# Patient Record
Sex: FEMALE | Race: WHITE | Employment: UNEMPLOYED | URBAN - METROPOLITAN AREA
[De-identification: names, ages, dates, MRNs, and addresses within clinical notes are randomized per-mention and may not be internally consistent; named-entity substitution may affect disease eponyms.]

---

## 2017-05-22 ENCOUNTER — OFFICE VISIT (OUTPATIENT)
Dept: FAMILY MEDICINE CLINIC | Age: 37
End: 2017-05-22

## 2017-05-22 VITALS
TEMPERATURE: 97.8 F | SYSTOLIC BLOOD PRESSURE: 121 MMHG | HEIGHT: 60 IN | OXYGEN SATURATION: 100 % | BODY MASS INDEX: 20.81 KG/M2 | WEIGHT: 106 LBS | DIASTOLIC BLOOD PRESSURE: 76 MMHG | HEART RATE: 69 BPM | RESPIRATION RATE: 16 BRPM

## 2017-05-22 DIAGNOSIS — N92.6 MISSED MENSES: Primary | ICD-10-CM

## 2017-05-22 DIAGNOSIS — Z64.1 GRAND MULTIPARA: ICD-10-CM

## 2017-05-22 LAB
HCG URINE, QL. (POC): POSITIVE
VALID INTERNAL CONTROL?: YES

## 2017-05-22 NOTE — PROGRESS NOTES
Chief Complaint   Patient presents with    Missed Menses     1. Have you been to the ER, urgent care clinic since your last visit? Hospitalized since your last visit? No    2. Have you seen or consulted any other health care providers outside of the [address] [place] [address] since your last visit? Include any pap smears or colon screening.  No

## 2017-05-22 NOTE — PATIENT INSTRUCTIONS
Weeks 6 to 10 of Your Pregnancy: Care Instructions  Your Care Instructions    Congratulations on your pregnancy. This is an exciting and important time for you. During the first 6 to 10 weeks of your pregnancy, your body goes through many changes. Your baby grows very fast, even though you cannot feel it yet. You may start to notice that you feel different, both in your body and your emotions. Because each woman's pregnancy is unique, there is no right way to feel. You may feel the healthiest you have ever been, or you may feel tired or sick to your stomach (\"morning sickness\"). These early weeks are a time to make healthy choices and to eat the best foods for you and your baby. This care sheet will give you some ideas. This is also a good time to think about birth defects testing. These are tests done during pregnancy to look for possible problems with the baby. First trimester tests for birth defects can be done between 8 and 17 weeks of pregnancy, depending on the test. Talk with your doctor about what kinds of tests are available. Follow-up care is a key part of your treatment and safety. Be sure to make and go to all appointments, and call your doctor if you are having problems. It's also a good idea to know your test results and keep a list of the medicines you take. How can you care for yourself at home? Eat well  · Eat at least 3 meals and 2 healthy snacks every day. Eat fresh, whole foods, including:  ¨ 7 or more servings of bread, tortillas, cereal, rice, pasta, or oatmeal.  ¨ 3 or more servings of vegetables, especially leafy green vegetables. ¨ 2 or more servings of fruits. ¨ 3 or more servings of milk, yogurt, or cheese. ¨ 2 or more servings of meat, turkey, chicken, fish, eggs, or dried beans. · Drink plenty of fluids, especially water. Avoid sodas and other sweetened drinks. · Choose foods that have important vitamins for your baby, such as calcium, iron, and folate.   ¨ Dairy products, tofu, canned fish with bones, almonds, broccoli, dark leafy greens, corn tortillas, and fortified orange juice are good sources of calcium. ¨ Beef, poultry, liver, spinach, lentils, dried beans, fortified cereals, and dried fruits are rich in iron. ¨ Dark leafy greens, broccoli, asparagus, liver, fortified cereals, orange juice, peanuts, and almonds are good sources of folate. · Avoid foods that could harm your baby. ¨ Do not eat raw or undercooked meat, chicken, or fish (such as sushi or raw oysters). ¨ Do not eat raw eggs or foods that contain raw eggs, such as Caesar dressing. ¨ Do not eat soft cheeses and unpasteurized dairy foods, such as Brie, feta, or blue cheese. ¨ Do not eat fish that contains a lot of mercury, such as shark, swordfish, tilefish, or shant mackerel. Do not eat more than 6 ounces of tuna each week. ¨ Do not eat raw sprouts, especially alfalfa sprouts. ¨ Cut down on caffeine, such as coffee, tea, and cola. Protect yourself and your baby  · Do not touch selma litter or cat feces. They can cause an infection that could harm your baby. · High body temperature can be harmful to your baby. So if you want to use a sauna or hot tub, be sure to talk to your doctor about how to use it safely. [place] with morning sickness  · Sip small amounts of water, juices, or shakes. Try drinking between meals, not with meals. · Eat 5 or 6 small meals a day. Try dry toast or crackers when you first get up, and eat breakfast a little later. · Avoid spicy, greasy, and fatty foods. · When you feel sick, open your windows or go for a short walk to get fresh air. · Try nausea wristbands. These help some women. · Tell your doctor if you think your prenatal vitamins make you sick. Where can you learn more? Go to http://sergo.info/. Enter G112 in the search box to learn more about \"Weeks 6 to 10 of Your Pregnancy: Care Instructions. \"  Current as of:  May 30, 2016  Content Version: 11.2  © 5912-6026 [employer]. Care instructions adapted under license by [employer] (which disclaims liability or warranty for this information). If you have questions about a medical condition or this instruction, always ask your healthcare professional. [address] any warranty or liability for your use of this information.

## 2017-05-22 NOTE — MR AVS SNAPSHOT
Visit Information Date & Time Provider Department Dept. Phone Encounter #  
 5/22/2017  9:40 AM Larisa Ray, [address] [facility] [phone] [id number] Upcoming Health Maintenance Date Due INFLUENZA AGE 9 TO ADULT 8/1/2017 PAP AKA CERVICAL CYTOLOGY 1/29/2019 DTaP/Tdap/Td series (2 - Td) 6/17/2026 Allergies as of 5/22/2017  Review Complete On: 5/22/2017 By: Lenny Ho LPN No Known Allergies Current Immunizations  Reviewed on 8/20/2016 Name Date Influenza Vaccine 1/31/2014 Influenza Vaccine (Quad) PF 10/27/2016 Influenza Vaccine Intradermal PF 3/9/2016 Td, Adsorbed PF 1/24/2014 Tdap 6/17/2016 Not reviewed this visit You Were Diagnosed With   
  
 Codes Comments Missed menses    -  Primary ICD-10-CM: N92.6 ICD-9-CM: 626.4 Vitals BP Pulse Temp Resp Height(growth percentile) Weight(growth percentile) 121/76 69 97.8 °F (36.6 °C) (Oral) 16 5' (1.524 m) 106 lb (48.1 kg) LMP SpO2 BMI OB Status Smoking Status 04/10/2017 (Exact Date) 100% 20.7 kg/m2 Pregnant Current Every Day Smoker BMI and BSA Data Body Mass Index Body Surface Area 20.7 kg/m 2 1.43 m 2 Preferred Pharmacy Pharmacy Name Phone [facility] [address] [place] [address] [phone] Your Updated Medication List  
  
   
This list is accurate as of: 5/22/17 10:16 AM.  Always use your most recent med list.  
  
  
  
  
 IBUPROFEN PO Take  by mouth.  
  
 levothyroxine 75 mcg tablet Commonly known as:  SYNTHROID Take 2 Tabs by mouth Daily (before breakfast). Indications: HYPOTHYROIDISM  
  
 OTHER Taking a antibiotic for a tooth infection but We Performed the Following AMB POC URINE PREGNANCY TEST, VISUAL COLOR COMPARISON [58467 CPT(R)] Patient Instructions Weeks 6 to 10 of Your Pregnancy: Care Instructions Your Care Instructions Congratulations on your pregnancy. This is an exciting and important time for you. During the first 6 to 10 weeks of your pregnancy, your body goes through many changes. Your baby grows very fast, even though you cannot feel it yet. You may start to notice that you feel different, both in your body and your emotions. Because each woman's pregnancy is unique, there is no right way to feel. You may feel the healthiest you have ever been, or you may feel tired or sick to your stomach (\"morning sickness\"). These early weeks are a time to make healthy choices and to eat the best foods for you and your baby. This care sheet will give you some ideas. This is also a good time to think about birth defects testing. These are tests done during pregnancy to look for possible problems with the baby. First trimester tests for birth defects can be done between 8 and 17 weeks of pregnancy, depending on the test. Talk with your doctor about what kinds of tests are available. Follow-up care is a key part of your treatment and safety. Be sure to make and go to all appointments, and call your doctor if you are having problems. It's also a good idea to know your test results and keep a list of the medicines you take. How can you care for yourself at home? Eat well · Eat at least 3 meals and 2 healthy snacks every day. Eat fresh, whole foods, including: ¨ 7 or more servings of bread, tortillas, cereal, rice, pasta, or oatmeal. 
¨ 3 or more servings of vegetables, especially leafy green vegetables. ¨ 2 or more servings of fruits. ¨ 3 or more servings of milk, yogurt, or cheese. ¨ 2 or more servings of meat, turkey, chicken, fish, eggs, or dried beans. · Drink plenty of fluids, especially water. Avoid sodas and other sweetened drinks. · Choose foods that have important vitamins for your baby, such as calcium, iron, and folate.  
¨ Dairy products, tofu, canned fish with bones, almonds, broccoli, dark leafy greens, corn tortillas, and fortified orange juice are good sources of calcium. ¨ Beef, poultry, liver, spinach, lentils, dried beans, fortified cereals, and dried fruits are rich in iron. ¨ Dark leafy greens, broccoli, asparagus, liver, fortified cereals, orange juice, peanuts, and almonds are good sources of folate. · Avoid foods that could harm your baby. ¨ Do not eat raw or undercooked meat, chicken, or fish (such as sushi or raw oysters). ¨ Do not eat raw eggs or foods that contain raw eggs, such as Caesar dressing. ¨ Do not eat soft cheeses and unpasteurized dairy foods, such as Brie, feta, or blue cheese. ¨ Do not eat fish that contains a lot of mercury, such as shark, swordfish, tilefish, or shant mackerel. Do not eat more than 6 ounces of tuna each week. ¨ Do not eat raw sprouts, especially alfalfa sprouts. ¨ Cut down on caffeine, such as coffee, tea, and cola. Protect yourself and your baby · Do not touch selma litter or cat feces. They can cause an infection that could harm your baby. · High body temperature can be harmful to your baby. So if you want to use a sauna or hot tub, be sure to talk to your doctor about how to use it safely. [place] with morning sickness · Sip small amounts of water, juices, or shakes. Try drinking between meals, not with meals. · Eat 5 or 6 small meals a day. Try dry toast or crackers when you first get up, and eat breakfast a little later. · Avoid spicy, greasy, and fatty foods. · When you feel sick, open your windows or go for a short walk to get fresh air. · Try nausea wristbands. These help some women. · Tell your doctor if you think your prenatal vitamins make you sick. Where can you learn more? Go to http://sergo.info/. Enter G112 in the search box to learn more about \"Weeks 6 to 10 of Your Pregnancy: Care Instructions. \" Current as of: May 30, 2016 Content Version: 11.2 © 6294-6737 [employer] Incorporated. Care instructions adapted under license by [employer] (which disclaims liability or warranty for this information). If you have questions about a medical condition or this instruction, always ask your healthcare professional. [address] any warranty or liability for your use of this information. Introducing [facility]! Dear Efra Dempsey: Thank you for requesting a [employer] account. Our records indicate that you already have an active [employer] account. You can access your account anytime at [email or web address] Did you know that you can access your hospital and ER discharge instructions at any time in [employer]? You can also review all of your test results from your hospital stay or ER visit. Additional Information If you have questions, please visit the Frequently Asked Questions section of the [employer] website at [email or web address] Remember, [employer] is NOT to be used for urgent needs. For medical emergencies, dial 911. Now available from your iPhone and Android! Please provide this summary of care documentation to your next provider. Your primary care clinician is listed as [address] [place] [address]. If you have any questions after today's visit, please call [phone].

## 2017-06-01 ENCOUNTER — OFFICE VISIT (OUTPATIENT)
Dept: OBGYN CLINIC | Age: 37
End: 2017-06-01

## 2017-06-01 VITALS
WEIGHT: 102 LBS | HEART RATE: 60 BPM | HEIGHT: 60 IN | DIASTOLIC BLOOD PRESSURE: 63 MMHG | RESPIRATION RATE: 19 BRPM | SYSTOLIC BLOOD PRESSURE: 115 MMHG | BODY MASS INDEX: 20.03 KG/M2

## 2017-06-01 DIAGNOSIS — O09.521 AMA (ADVANCED MATERNAL AGE) MULTIGRAVIDA 35+, FIRST TRIMESTER: Primary | ICD-10-CM

## 2017-06-01 DIAGNOSIS — E03.9 ACQUIRED HYPOTHYROIDISM: ICD-10-CM

## 2017-06-01 PROBLEM — Z34.90 PREGNANCY: Status: ACTIVE | Noted: 2017-06-01

## 2017-06-01 RX ORDER — AMOXICILLIN 500 MG/1
TABLET, FILM COATED ORAL
COMMUNITY
End: 2017-07-24

## 2017-06-01 NOTE — PROGRESS NOTES
Current pregnancy history:    Michelle Lowe is a 29 y.o. female who presents for the evaluation of pregnancy. Patient's last menstrual period was 04/10/2017 (exact date). LMP history:  The date of her LMP is certain. Her last menstrual period was normal and lasted for 4 to 5 days. A urine pregnancy test was positive 2 weeks ago. She was not on the pill at conception. Based on her LMP, her EDC is 01/15/2018 and her EGA is 7 weeks,3 days. Her menstrual cycles are regular and occur approximately every 28 days  and range from 3 to 5 days. The last menses lasted the usual number of days. Ultrasound data:  She had an  ultrasound done by the ultrasound tech today which revealed a viable vail pregnancy with a gestational age of 7 weeks and 4 days giving an [facility] of 2018. Pregnancy symptoms:    Since her LMP she has experienced  urinary frequency, breast tenderness, and nausea. She has not been vomiting over the last few weeks. Associated signs and symptoms which she denies: dysuria, discharge, vaginal bleeding. She states she has not gained weight. Relevant past pregnancy history:   She has the following pregnancy history: Her last pregnancy was uncomplicated. This is her eighth pregnancy   She has no history of  delivery. Relevant past medical history:(relevant to this pregnancy): noncontributory. Pap/Occupational history:  Last pap smear: 2016 Results: Neg/Neg    Her occupation is: homemaker1. Substance history: negative for alcohol and street drugs. Positive for tobacco.  Exposure history: There are no indoor cats in the home. She admits close contact with children on a regular basis. She has had chicken pox or the vaccine in the past.   Patient denies issues with domestic violence. Genetic Screening/Teratology Counseling: (Includes patient, baby's father, or anyone in either family with:)  3.  Patient's age >/= 28 at [facility]?-- yes.    2. Thalassemia ([facility], [facility], [address], or  background): MCV<80?--no.     3.  Neural tube defect (meningomyelocele, spina bifida, anencephaly)?--no.   4.  Congenital heart defect?--no.  5.  Down syndrome?--no.   6.  Pierre-Sachs (Yazidism, [place])?--no.   7.  Canavan's Disease?--no.   8.  Familial Dysautonomia?--no.   9.  Sickle cell disease or trait ()? --no   The patient has not been tested for sickle trait  10. Hemophilia or other blood disorders?--no. 11.  Muscular dystrophy?--no. 12.  Cystic fibrosis?--no. 13.  [place]'s Chorea?--no. 14.  Mental retardation/autism (if yes was person tested for Fragile X)?--no. 15.  Other inherited genetic or chromosomal disorder?--no. 12.  Maternal metabolic disorder (DM, PKU, etc)?--no. 17.  Patient or FOB with a child with a birth defect not listed above?--no.  17a. Patient or FOB with a birth defect themselves?--no. 18.  Recurrent pregnancy loss, or stillbirth?--no. 19.  Any medications since LMP other than prenatal vitamins (include vitamins,  supplements, OTC meds, drugs, alcohol)?--no. 20.  Any other genetic/environmental exposure to discuss?--no. Infection History:  1. Lives with someone with TB or TB exposed?--no.   2.  Patient or partner has history of genital herpes?--no.  3.  Rash or viral illness since LMP?--no.    4.  History of STD (GC, CT, HPV, syphilis, HIV)? --no   5. Other:no    Past Medical History:   Diagnosis Date    Acquired hypothyroidism     hypothyroidism - takes 75 mcg synthroid    Anemia     Depression     Tobacco abuse      No past surgical history on file.   Social History     Occupational History     Not Employed     Social History Main Topics    Smoking status: Current Every Day Smoker     Packs/day: 0.30     Years: 20.00     Types: Cigarettes     Last attempt to quit: 1/29/2016    Smokeless tobacco: Never Used      Comment: Smokes 3-5 cigarettes a day depending on the day    Alcohol use No    Drug use: No    Sexual activity: Yes     Partners: Male     Birth control/ protection: None     Family History   Problem Relation Age of Onset    Cancer Maternal Aunt     Diabetes Maternal Uncle     Cancer Maternal Grandfather     Heart Disease Mother        No Known Allergies  Prior to Admission medications    Medication Sig Start Date End Date Taking? Authorizing Provider   amoxicillin 500 mg tab Take  by mouth. Yes Historical Provider   levothyroxine (SYNTHROID) 75 mcg tablet Take 2 Tabs by mouth Daily (before breakfast).  Indications: HYPOTHYROIDISM 10/7/16  Yes Ovidio Carter MD        Review of Systems: History obtained from the patient  Constitutional: negative for weight loss, fever, night sweats  HEENT: negative for hearing loss, earache, congestion, snoring, sorethroat  CV: negative for chest pain, palpitations, edema  Resp: negative for cough, shortness of breath, wheezing  Breast: negative for breast lumps, nipple discharge, galactorrhea  GI: negative for change in bowel habits, abdominal pain, black or bloody stools  : negative for frequency, dysuria, hematuria, vaginal discharge  MSK: negative for back pain, joint pain, muscle pain  Skin: negative for itching, rash, hives  Neuro: negative for dizziness, headache, confusion, weakness  Psych: negative for anxiety, depression, change in mood  Heme/lymph: negative for bleeding, bruising, pallor    Objective:  Visit Vitals    /63 (BP 1 Location: Left arm, BP Patient Position: Sitting)    Pulse 60    Resp 19    Ht 5' (1.524 m)    Wt 102 lb (46.3 kg)    LMP 04/10/2017 (Exact Date)    BMI 19.92 kg/m2       Physical Exam:   PHYSICAL EXAMINATION    Constitutional  · Appearance: well-nourished, well developed, alert, in no acute distress    HENT  · Head  · Face: appears normal  · Eyes: appear normal  · Ears: normal  · Mouth: normal  · Lips: no lesions    Neck  · Inspection/Palpation: normal appearance, no masses or tenderness  · Lymph Nodes: no lymphadenopathy present  · Thyroid: gland size normal, nontender, no nodules or masses present on palpation    Chest  · Respiratory Effort: breathing unlabored  · Auscultation: normal breath sounds    Cardiovascular  · Heart:  · Auscultation: regular rate and rhythm without murmur    Breasts  · Inspection of Breasts: breasts symmetrical, no skin changes, no discharge present, nipple appearance normal, no skin retraction present  · Palpation of Breasts and Axillae: no masses present on palpation, no breast tenderness  · Axillary Lymph Nodes: no lymphadenopathy present    Gastrointestinal  · Abdominal Examination: abdomen non-tender to palpation, normal bowel sounds, no masses present  · Liver and spleen: no hepatomegaly present, spleen not palpable  · Hernias: no hernias identified    Genitourinary  · External Genitalia: normal appearance for age, no discharge present, no tenderness present, no inflammatory lesions present, no masses present, no atrophy present  · Vagina: normal vaginal vault without central or paravaginal defects, no discharge present, no inflammatory lesions present, no masses present  · Bladder: non-tender to palpation  · Urethra: appears normal  · Cervix: normal   · Uterus: enlarged, normal shape, soft  · Adnexa: no adnexal tenderness present, no adnexal masses present  · Perineum: perineum within normal limits, no evidence of trauma, no rashes or skin lesions present  · Anus: anus within normal limits, no hemorrhoids present  · Inguinal Lymph Nodes: no lymphadenopathy present    Skin  · General Inspection: no rash, no lesions identified    Neurologic/Psychiatric  · Mental Status:  · Orientation: grossly oriented to person, place and time  · Mood and Affect: mood normal, affect appropriate    Assessment:   Intrauterine pregnancy with the following problems identified: grand multip, AMA. Plan:     Offered CF testing, CVS, Nuchal Translucency, MSAFP, amnio, and discussed NIPT.  She wants NIPTs. Course of pregnancy discussed including visit schedule, routine U/S, glucola testing, etc.  Avoid alcoholic beverages and illicit/recreational drugs use  Take prenatal vitamins or folic acid daily. Hospital and practice style discussed with coverage system. Discussed nutrition, toxoplasmosis precautions, sexual activity, exercise, need for influenza vaccine, environmental and work hazards, travel advice, screen for domestic violence, need for seat belts. Discussed seafood, unpasteurized dairy products, deli meat, artificial sweeteners, and caffeine. Information on prenatal classes/breastfeeding given. Information on circumcision given  Patient encouraged not to smoke. Discussed current prescription drug use. Given medication list.  Discussed the use of over the counter medications and chemicals. Pt understands risk of hemorrhage during pregnancy and post delivery and would accept blood products if necessary in life-threatening emergencies      Handouts given to pt.

## 2017-06-26 ENCOUNTER — ROUTINE PRENATAL (OUTPATIENT)
Dept: OBGYN CLINIC | Age: 37
End: 2017-06-26

## 2017-06-26 VITALS
RESPIRATION RATE: 19 BRPM | HEIGHT: 60 IN | WEIGHT: 100 LBS | HEART RATE: 103 BPM | DIASTOLIC BLOOD PRESSURE: 72 MMHG | SYSTOLIC BLOOD PRESSURE: 117 MMHG | BODY MASS INDEX: 19.63 KG/M2

## 2017-06-26 DIAGNOSIS — O09.521 AMA (ADVANCED MATERNAL AGE) MULTIGRAVIDA 35+, FIRST TRIMESTER: Primary | ICD-10-CM

## 2017-06-26 NOTE — PROGRESS NOTES
Doing well, needs oral surgery and will plan it in 4 weeks; she will see [facility] for US and NIPTs in 1-2 weeks; advised of thyroid results and need to adjust dose

## 2017-07-11 ENCOUNTER — HOSPITAL ENCOUNTER (OUTPATIENT)
Dept: PERINATAL CARE | Age: 37
Discharge: HOME | End: 2017-07-11
Attending: OBSTETRICS & GYNECOLOGY
Payer: COMMERCIAL

## 2017-07-11 PROCEDURE — 76801 OB US < 14 WKS SINGLE FETUS: CPT | Performed by: OBSTETRICS & GYNECOLOGY

## 2017-07-11 PROCEDURE — 36416 COLLJ CAPILLARY BLOOD SPEC: CPT | Performed by: OBSTETRICS & GYNECOLOGY

## 2017-07-11 PROCEDURE — 76813 OB US NUCHAL MEAS 1 GEST: CPT | Performed by: OBSTETRICS & GYNECOLOGY

## 2017-07-11 PROCEDURE — 99215 OFFICE O/P EST HI 40 MIN: CPT | Performed by: OBSTETRICS & GYNECOLOGY

## 2017-07-13 LAB
ABO GROUP BLD: NORMAL
BACTERIA UR CULT: NO GROWTH
BLD GP AB SCN SERPL QL: NEGATIVE
C TRACH RRNA SPEC QL NAA+PROBE: NEGATIVE
ERYTHROCYTE [DISTWIDTH] IN BLOOD BY AUTOMATED COUNT: 14.7 % (ref 12.3–15.4)
HBV SURFACE AG SERPL QL IA: NEGATIVE
HCT VFR BLD AUTO: 38.9 % (ref 34–46.6)
HGB BLD-MCNC: 12.6 G/DL (ref 11.1–15.9)
HIV 1+2 AB+HIV1 P24 AG SERPL QL IA: NON REACTIVE
MCH RBC QN AUTO: 28.9 PG (ref 26.6–33)
MCHC RBC AUTO-ENTMCNC: 32.4 G/DL (ref 31.5–35.7)
MCV RBC AUTO: 89 FL (ref 79–97)
N GONORRHOEA RRNA SPEC QL NAA+PROBE: NEGATIVE
PLATELET # BLD AUTO: 321 X10E3/UL (ref 150–379)
RBC # BLD AUTO: 4.36 X10E6/UL (ref 3.77–5.28)
RH BLD: POSITIVE
RUBV IGG SERPL IA-ACNC: 7.13 INDEX
T PALLIDUM AB SER QL IA: NEGATIVE
T4 FREE SERPL-MCNC: 1.29 NG/DL (ref 0.82–1.77)
TSH SERPL DL<=0.005 MIU/L-ACNC: 6.02 UIU/ML (ref 0.45–4.5)
WBC # BLD AUTO: 8.9 X10E3/UL (ref 3.4–10.8)

## 2017-07-18 ENCOUNTER — TELEPHONE (OUTPATIENT)
Dept: PERINATAL CARE | Age: 37
End: 2017-07-18

## 2017-07-24 ENCOUNTER — ROUTINE PRENATAL (OUTPATIENT)
Dept: OBGYN CLINIC | Age: 37
End: 2017-07-24

## 2017-07-24 VITALS — SYSTOLIC BLOOD PRESSURE: 117 MMHG | DIASTOLIC BLOOD PRESSURE: 67 MMHG | BODY MASS INDEX: 20.39 KG/M2 | WEIGHT: 104.4 LBS

## 2017-07-24 DIAGNOSIS — Z3A.15 15 WEEKS GESTATION OF PREGNANCY: Primary | ICD-10-CM

## 2017-07-24 NOTE — PATIENT INSTRUCTIONS

## 2017-07-26 LAB
AFP ADJ MOM SERPL: 0.58
AFP INTERP SERPL-IMP: NORMAL
AFP INTERP SERPL-IMP: NORMAL
AFP SERPL-MCNC: 21 NG/ML
AGE AT DELIVERY: 36.1 YEARS
COMMENT, 01827: NORMAL
GA METHOD: NORMAL
GA: 15 WEEKS
IDDM PATIENT QL: NO
Lab: NORMAL
MULTIPLE PREGNANCY: NO
NEURAL TUBE DEFECT RISK FETUS: NORMAL %
RESULTS, 017004: NORMAL

## 2017-08-28 ENCOUNTER — HOSPITAL ENCOUNTER (OUTPATIENT)
Dept: PERINATAL CARE | Age: 37
Discharge: HOME | End: 2017-08-28
Attending: OBSTETRICS & GYNECOLOGY
Payer: COMMERCIAL

## 2017-08-28 ENCOUNTER — ROUTINE PRENATAL (OUTPATIENT)
Dept: OBGYN CLINIC | Age: 37
End: 2017-08-28

## 2017-08-28 VITALS
BODY MASS INDEX: 21.4 KG/M2 | WEIGHT: 109 LBS | DIASTOLIC BLOOD PRESSURE: 64 MMHG | HEIGHT: 60 IN | RESPIRATION RATE: 19 BRPM | HEART RATE: 76 BPM | SYSTOLIC BLOOD PRESSURE: 105 MMHG

## 2017-08-28 DIAGNOSIS — Z3A.20 20 WEEKS GESTATION OF PREGNANCY: ICD-10-CM

## 2017-08-28 DIAGNOSIS — O09.521 AMA (ADVANCED MATERNAL AGE) MULTIGRAVIDA 35+, FIRST TRIMESTER: Primary | ICD-10-CM

## 2017-08-28 PROCEDURE — 76811 OB US DETAILED SNGL FETUS: CPT | Performed by: OBSTETRICS & GYNECOLOGY

## 2017-08-28 NOTE — PROGRESS NOTES
US today:    Report Summary:  Impression:   Single viable IUP with biometry measurements consistent with prior dating is observed. EFW is appropriate for  gestational age. Maternal and fetal anatomy appears normal as indicated above. The fetal anatomy is notable for a shorter-thanexpected  femur length (soft-marker for fetal Trisomy 21). Normal fetal movements and amniotic fluid measurements are noted. She declines further genetic work up.

## 2017-09-25 ENCOUNTER — ROUTINE PRENATAL (OUTPATIENT)
Dept: OBGYN CLINIC | Age: 37
End: 2017-09-25

## 2017-09-25 VITALS
WEIGHT: 119 LBS | SYSTOLIC BLOOD PRESSURE: 119 MMHG | HEART RATE: 80 BPM | HEIGHT: 60 IN | DIASTOLIC BLOOD PRESSURE: 70 MMHG | BODY MASS INDEX: 23.36 KG/M2 | RESPIRATION RATE: 19 BRPM

## 2017-09-25 DIAGNOSIS — Z3A.24 24 WEEKS GESTATION OF PREGNANCY: ICD-10-CM

## 2017-09-25 DIAGNOSIS — O09.521 AMA (ADVANCED MATERNAL AGE) MULTIGRAVIDA 35+, FIRST TRIMESTER: Primary | ICD-10-CM

## 2017-10-23 ENCOUNTER — ROUTINE PRENATAL (OUTPATIENT)
Dept: OBGYN CLINIC | Age: 37
End: 2017-10-23

## 2017-10-23 VITALS
HEART RATE: 94 BPM | RESPIRATION RATE: 19 BRPM | HEIGHT: 60 IN | DIASTOLIC BLOOD PRESSURE: 65 MMHG | SYSTOLIC BLOOD PRESSURE: 117 MMHG | WEIGHT: 123 LBS | BODY MASS INDEX: 24.15 KG/M2

## 2017-10-23 DIAGNOSIS — Z23 ENCOUNTER FOR IMMUNIZATION: ICD-10-CM

## 2017-10-23 DIAGNOSIS — Z3A.28 28 WEEKS GESTATION OF PREGNANCY: ICD-10-CM

## 2017-10-23 DIAGNOSIS — O09.521 AMA (ADVANCED MATERNAL AGE) MULTIGRAVIDA 35+, FIRST TRIMESTER: Primary | ICD-10-CM

## 2017-10-23 NOTE — PROGRESS NOTES
After obtaining consent, and per orders of Dr. Joan Roberson, injection of the Influenza Vaccine given in right deltoid by Virgie Lanza LPN. Patient instructed to remain in clinic for 20 minutes afterwards, and to report any adverse reaction to me immediately.

## 2017-10-24 LAB
ERYTHROCYTE [DISTWIDTH] IN BLOOD BY AUTOMATED COUNT: 16.1 % (ref 12.3–15.4)
GLUCOSE 1H P 50 G GLC PO SERPL-MCNC: 88 MG/DL (ref 65–139)
HCT VFR BLD AUTO: 29.1 % (ref 34–46.6)
HGB BLD-MCNC: 9.5 G/DL (ref 11.1–15.9)
MCH RBC QN AUTO: 27.9 PG (ref 26.6–33)
MCHC RBC AUTO-ENTMCNC: 32.6 G/DL (ref 31.5–35.7)
MCV RBC AUTO: 86 FL (ref 79–97)
PLATELET # BLD AUTO: 272 X10E3/UL (ref 150–379)
RBC # BLD AUTO: 3.4 X10E6/UL (ref 3.77–5.28)
WBC # BLD AUTO: 11.1 X10E3/UL (ref 3.4–10.8)

## 2017-10-24 NOTE — PROGRESS NOTES
Your glucose test is normal. Your are still very anemic. Be sure to take prenatal vitamins once a day and extra iron 3 times a day.

## 2017-11-06 ENCOUNTER — ROUTINE PRENATAL (OUTPATIENT)
Dept: OBGYN CLINIC | Age: 37
End: 2017-11-06

## 2017-11-06 VITALS
HEIGHT: 60 IN | BODY MASS INDEX: 24.15 KG/M2 | DIASTOLIC BLOOD PRESSURE: 68 MMHG | WEIGHT: 123 LBS | HEART RATE: 107 BPM | RESPIRATION RATE: 19 BRPM | SYSTOLIC BLOOD PRESSURE: 115 MMHG

## 2017-11-06 DIAGNOSIS — Z3A.30 30 WEEKS GESTATION OF PREGNANCY: ICD-10-CM

## 2017-11-06 DIAGNOSIS — Z23 ENCOUNTER FOR IMMUNIZATION: ICD-10-CM

## 2017-11-06 DIAGNOSIS — O09.521 AMA (ADVANCED MATERNAL AGE) MULTIGRAVIDA 35+, FIRST TRIMESTER: Primary | ICD-10-CM

## 2017-11-06 NOTE — PROGRESS NOTES
Normal glucola at last visit, but anemic.  Doing well, good FM, advised to take extra iron  Tdap today

## 2017-11-06 NOTE — PROGRESS NOTES
Immunization/s administered 11/6/2017 by Sandra Montano LPN with patient's consent. Lot [id number] exp 11/30/19 left deltoid   Patient tolerated procedure well. No reactions noted.

## 2017-11-20 ENCOUNTER — ROUTINE PRENATAL (OUTPATIENT)
Dept: OBGYN CLINIC | Age: 37
End: 2017-11-20

## 2017-11-20 VITALS
HEIGHT: 60 IN | HEART RATE: 101 BPM | BODY MASS INDEX: 25.32 KG/M2 | RESPIRATION RATE: 19 BRPM | DIASTOLIC BLOOD PRESSURE: 72 MMHG | WEIGHT: 129 LBS | SYSTOLIC BLOOD PRESSURE: 118 MMHG

## 2017-11-20 DIAGNOSIS — Z3A.32 32 WEEKS GESTATION OF PREGNANCY: ICD-10-CM

## 2017-11-20 DIAGNOSIS — O09.521 AMA (ADVANCED MATERNAL AGE) MULTIGRAVIDA 35+, FIRST TRIMESTER: Primary | ICD-10-CM

## 2017-12-04 ENCOUNTER — ROUTINE PRENATAL (OUTPATIENT)
Dept: OBGYN CLINIC | Age: 37
End: 2017-12-04

## 2017-12-04 VITALS
HEART RATE: 92 BPM | SYSTOLIC BLOOD PRESSURE: 119 MMHG | WEIGHT: 127 LBS | DIASTOLIC BLOOD PRESSURE: 60 MMHG | RESPIRATION RATE: 19 BRPM | BODY MASS INDEX: 24.94 KG/M2 | HEIGHT: 60 IN

## 2017-12-04 DIAGNOSIS — Z3A.34 34 WEEKS GESTATION OF PREGNANCY: ICD-10-CM

## 2017-12-04 DIAGNOSIS — O09.521 AMA (ADVANCED MATERNAL AGE) MULTIGRAVIDA 35+, FIRST TRIMESTER: Primary | ICD-10-CM

## 2017-12-11 ENCOUNTER — ROUTINE PRENATAL (OUTPATIENT)
Dept: OBGYN CLINIC | Age: 37
End: 2017-12-11

## 2017-12-11 VITALS
RESPIRATION RATE: 19 BRPM | BODY MASS INDEX: 25.13 KG/M2 | HEART RATE: 92 BPM | WEIGHT: 128 LBS | HEIGHT: 60 IN | SYSTOLIC BLOOD PRESSURE: 116 MMHG | DIASTOLIC BLOOD PRESSURE: 66 MMHG

## 2017-12-11 DIAGNOSIS — O09.521 AMA (ADVANCED MATERNAL AGE) MULTIGRAVIDA 35+, FIRST TRIMESTER: Primary | ICD-10-CM

## 2017-12-11 DIAGNOSIS — Z3A.35 35 WEEKS GESTATION OF PREGNANCY: ICD-10-CM

## 2017-12-11 LAB — GRBS, EXTERNAL: NEGATIVE

## 2017-12-13 LAB — GP B STREP DNA SPEC QL NAA+PROBE: NEGATIVE

## 2017-12-20 ENCOUNTER — ROUTINE PRENATAL (OUTPATIENT)
Dept: OBGYN CLINIC | Age: 37
End: 2017-12-20

## 2017-12-20 VITALS
SYSTOLIC BLOOD PRESSURE: 119 MMHG | HEIGHT: 60 IN | WEIGHT: 128 LBS | RESPIRATION RATE: 19 BRPM | HEART RATE: 100 BPM | DIASTOLIC BLOOD PRESSURE: 74 MMHG | BODY MASS INDEX: 25.13 KG/M2

## 2017-12-20 DIAGNOSIS — O09.521 AMA (ADVANCED MATERNAL AGE) MULTIGRAVIDA 35+, FIRST TRIMESTER: Primary | ICD-10-CM

## 2017-12-20 DIAGNOSIS — Z3A.36 36 WEEKS GESTATION OF PREGNANCY: ICD-10-CM

## 2017-12-26 ENCOUNTER — ROUTINE PRENATAL (OUTPATIENT)
Dept: OBGYN CLINIC | Age: 37
End: 2017-12-26

## 2017-12-26 VITALS
DIASTOLIC BLOOD PRESSURE: 76 MMHG | SYSTOLIC BLOOD PRESSURE: 137 MMHG | HEART RATE: 91 BPM | HEIGHT: 60 IN | BODY MASS INDEX: 24.74 KG/M2 | WEIGHT: 126 LBS

## 2017-12-26 DIAGNOSIS — O09.523 SUPERVISION OF ELDERLY MULTIGRAVIDA IN THIRD TRIMESTER: Primary | ICD-10-CM

## 2017-12-26 NOTE — PATIENT INSTRUCTIONS
[employer] Desk: [phone]       Week 40 of Your Pregnancy: Care Instructions  Your Care Instructions    You are near the end of your pregnancy-and you're probably pretty uncomfortable. It may be harder to walk around. Lying down probably isn't comfortable either. You may have trouble getting to sleep or staying asleep. Most women deliver their babies between 40 and 41 weeks. This is a good time to think about packing a bag for the hospital with items you'll need. Then you'll be ready when labor starts. Follow-up care is a key part of your treatment and safety. Be sure to make and go to all appointments, and call your doctor if you are having problems. It's also a good idea to know your test results and keep a list of the medicines you take. How can you care for yourself at home? Learn about breastfeeding  · Breastfeeding is best for your baby and good for you. · Breast milk has antibodies to help your baby fight infections. · Mothers who breastfeed often lose weight faster, because making milk burns calories. · Learning the best ways to hold your baby will make breastfeeding easier. · Let your partner bathe and diaper the baby to keep your partner from feeling left out. Snuggle together when you breastfeed. · You may want to learn how to use a breast pump and store your milk. · If you choose to bottle feed, make the feeding feel like breastfeeding so you can bond with your baby. Always hold your baby and the bottle. Do not prop bottles or let your baby fall asleep with a bottle. Learn about crying  · It is common for babies to cry for 1 to 3 hours a day. Some cry more, some cry less. · Babies don't cry to make you upset or because you are a bad parent. · Crying is how your baby communicates. Your baby may be hungry; have gas; need a diaper change; or feel cold, warm, tired, lonely, or tense. Sometimes babies cry for unknown reasons.   · If you respond to your baby's needs, he or she will learn to trust you. · Try to stay calm when your baby cries. Your baby may get more upset if he or she senses that you are upset. Know how to care for your   · Your baby's umbilical cord stump will drop off on its own, usually between 1 and 2 weeks. To care for your baby's umbilical cord area:  ¨ Clean the area at the bottom of the cord 2 or 3 times a day. ¨ Pay special attention to the area where the cord attaches to the skin. ¨ Keep the diaper folded below the cord. ¨ Use a damp washcloth or cotton ball to sponge bathe your baby until the stump has come off. · Your baby's first dark stool is called meconium. After the meconium is passed, your baby will develop his or her own bowel pattern. ¨ Some babies, especially  babies, have several bowel movements a day. Others have one or two a day, or one every 2 to 3 days. ¨  babies often have loose, yellow stools. Formula-fed babies have more formed stools. ¨ If your baby's stools look like little pellets, he or she is constipated. After 2 days of constipation, call your baby's doctor. · If your baby will be circumcised, you can care for him at home. ¨ Gently rinse his penis with warm water after every diaper change. Do not try to remove the film that forms on the penis. This film will go away on its own. Pat dry. ¨ Put petroleum ointment, such as Vaseline, on the area of the diaper that will touch your baby's penis. This will keep the diaper from sticking to your baby. ¨ Ask the doctor about giving your baby acetaminophen (Tylenol) for pain. Where can you learn more? Go to http://eloy-kalia.info/. Enter 68 21 97 in the search box to learn more about \"Week 37 of Your Pregnancy: Care Instructions. \"  Current as of: 2017  Content Version: 11.4  © 9303-5569 CensorNet. Care instructions adapted under license by Digicompanion (which disclaims liability or warranty for this information).  If you have questions about a medical condition or this instruction, always ask your healthcare professional. [place] [address] any warranty or liability for your use of this information.

## 2017-12-26 NOTE — MR AVS SNAPSHOT
Visit Information Date & Time Provider Department Dept. Phone Encounter #  
 12/26/2017  9:30 AM Olga Slade MD [facility] [phone] [id number] Upcoming Health Maintenance Date Due  
 PAP AKA CERVICAL CYTOLOGY 1/29/2019 Allergies as of 12/26/2017  Review Complete On: 12/26/2017 By: Rj Acosta No Known Allergies Current Immunizations  Reviewed on 8/20/2016 Name Date Influenza Vaccine 1/31/2014 Influenza Vaccine (Quad) PF 10/23/2017, 10/27/2016 Influenza Vaccine Intradermal PF 3/9/2016 Td, Adsorbed PF 1/24/2014 Tdap 11/6/2017, 6/17/2016 Not reviewed this visit Vitals BP Pulse Height(growth percentile) Weight(growth percentile) LMP Breastfeeding? 137/76 91 5' (1.524 m) 126 lb (57.2 kg) 04/10/2017 (Exact Date) No  
 BMI OB Status Smoking Status 24.61 kg/m2 Pregnant Current Every Day Smoker BMI and BSA Data Body Mass Index Body Surface Area  
 24.61 kg/m 2 1.56 m 2 Preferred Pharmacy Pharmacy Name Phone [facility] [address] [place] [address] [phone] Your Updated Medication List  
  
   
This list is accurate as of: 12/26/17  9:31 AM.  Always use your most recent med list.  
  
  
  
  
 levothyroxine 75 mcg tablet Commonly known as:  SYNTHROID Take 2 Tabs by mouth Daily (before breakfast). Indications: HYPOTHYROIDISM Patient Instructions [facility] Help Desk: [phone] Week 37 of Your Pregnancy: Care Instructions Your Care Instructions You are near the end of your pregnancy-and you're probably pretty uncomfortable. It may be harder to walk around. Lying down probably isn't comfortable either. You may have trouble getting to sleep or staying asleep. Most women deliver their babies between 40 and 41 weeks. This is a good time to think about packing a bag for the hospital with items you'll need. Then you'll be ready when labor starts. Follow-up care is a key part of your treatment and safety. Be sure to make and go to all appointments, and call your doctor if you are having problems. It's also a good idea to know your test results and keep a list of the medicines you take. How can you care for yourself at home? Learn about breastfeeding · Breastfeeding is best for your baby and good for you. · Breast milk has antibodies to help your baby fight infections. · Mothers who breastfeed often lose weight faster, because making milk burns calories. · Learning the best ways to hold your baby will make breastfeeding easier. · Let your partner bathe and diaper the baby to keep your partner from feeling left out. Snuggle together when you breastfeed. · You may want to learn how to use a breast pump and store your milk. · If you choose to bottle feed, make the feeding feel like breastfeeding so you can bond with your baby. Always hold your baby and the bottle. Do not prop bottles or let your baby fall asleep with a bottle. Learn about crying · It is common for babies to cry for 1 to 3 hours a day. Some cry more, some cry less. · Babies don't cry to make you upset or because you are a bad parent. · Crying is how your baby communicates. Your baby may be hungry; have gas; need a diaper change; or feel cold, warm, tired, lonely, or tense. Sometimes babies cry for unknown reasons. · If you respond to your baby's needs, he or she will learn to trust you. · Try to stay calm when your baby cries. Your baby may get more upset if he or she senses that you are upset. Know how to care for your  · Your baby's umbilical cord stump will drop off on its own, usually between 1 and 2 weeks. To care for your baby's umbilical cord area: ¨ Clean the area at the bottom of the cord 2 or 3 times a day. ¨ Pay special attention to the area where the cord attaches to the skin. ¨ Keep the diaper folded below the cord. ¨ Use a damp washcloth or cotton ball to sponge bathe your baby until the stump has come off. · Your baby's first dark stool is called meconium. After the meconium is passed, your baby will develop his or her own bowel pattern. ¨ Some babies, especially  babies, have several bowel movements a day. Others have one or two a day, or one every 2 to 3 days. ¨  babies often have loose, yellow stools. Formula-fed babies have more formed stools. ¨ If your baby's stools look like little pellets, he or she is constipated. After 2 days of constipation, call your baby's doctor. · If your baby will be circumcised, you can care for him at home. ¨ Gently rinse his penis with warm water after every diaper change. Do not try to remove the film that forms on the penis. This film will go away on its own. Pat dry. ¨ Put petroleum ointment, such as Vaseline, on the area of the diaper that will touch your baby's penis. This will keep the diaper from sticking to your baby. ¨ Ask the doctor about giving your baby acetaminophen (Tylenol) for pain. Where can you learn more? Go to http://eloy-kalia.info/. Enter 68 21 97 in the search box to learn more about \"Week 37 of Your Pregnancy: Care Instructions. \" Current as of: March 16, 2017 Content Version: 11.4 © 4487-8345 Bazaart. Care instructions adapted under license by Stylecrook (which disclaims liability or warranty for this information). If you have questions about a medical condition or this instruction, always ask your healthcare professional. [place] [address] any warranty or liability for your use of this information. Introducing [facility] & HEALTH SERVICES! Dear Colby: Thank you for requesting a [employer] account. Our records indicate that you already have an active [employer] account. You can access your account anytime at [email or web address] Mala Did you know that you can access your hospital and ER discharge instructions at any time in [email or web address]? You can also review all of your test results from your hospital stay or ER visit. Additional Information If you have questions, please visit the Frequently Asked Questions section of the [email or web address] website at [email or web address] Remember, [email or web address] is NOT to be used for urgent needs. For medical emergencies, dial 911. Now available from your iPhone and Android! Please provide this summary of care documentation to your next provider. Your primary care clinician is listed as [address] [place] [address]. If you have any questions after today's visit, please call [phone].

## 2017-12-26 NOTE — PROGRESS NOTES
C/o ? Vaginal leaking daily. H/O oligohydramnios in previous pregnancy. Nitrazine and fern is negative.  She will get an US to check fluid

## 2018-01-03 ENCOUNTER — ROUTINE PRENATAL (OUTPATIENT)
Dept: OBGYN CLINIC | Age: 38
End: 2018-01-03

## 2018-01-03 VITALS
HEIGHT: 60 IN | DIASTOLIC BLOOD PRESSURE: 68 MMHG | RESPIRATION RATE: 19 BRPM | WEIGHT: 128 LBS | SYSTOLIC BLOOD PRESSURE: 118 MMHG | BODY MASS INDEX: 25.13 KG/M2

## 2018-01-03 DIAGNOSIS — Z3A.38 38 WEEKS GESTATION OF PREGNANCY: ICD-10-CM

## 2018-01-03 DIAGNOSIS — O09.523 SUPERVISION OF ELDERLY MULTIGRAVIDA IN THIRD TRIMESTER: Primary | ICD-10-CM

## 2018-01-03 DIAGNOSIS — O09.521 AMA (ADVANCED MATERNAL AGE) MULTIGRAVIDA 35+, FIRST TRIMESTER: ICD-10-CM

## 2018-01-03 NOTE — PROGRESS NOTES
US today - LIMITED OB SCAN  A SINGLE VERTEX 38W2D IUP IS SEEN. FETAL CARDIAC MOTION OBSERVED. LIMITED ANATOMY WAS VISUALIZED AND APPEARS WNL. APPROPRIATE FETAL GROWTH IS SEEN. SIZE = DATES. PLACENTA APPEARS WNL. ERAN APPEARS TO BE AT THE UPPER LIMITS AND MEASURES 24CM.     Labor precautions

## 2018-01-08 ENCOUNTER — ROUTINE PRENATAL (OUTPATIENT)
Dept: OBGYN CLINIC | Age: 38
End: 2018-01-08

## 2018-01-08 VITALS
WEIGHT: 133 LBS | RESPIRATION RATE: 19 BRPM | HEART RATE: 97 BPM | BODY MASS INDEX: 26.11 KG/M2 | SYSTOLIC BLOOD PRESSURE: 135 MMHG | HEIGHT: 60 IN | DIASTOLIC BLOOD PRESSURE: 75 MMHG

## 2018-01-08 DIAGNOSIS — Z3A.39 39 WEEKS GESTATION OF PREGNANCY: ICD-10-CM

## 2018-01-08 DIAGNOSIS — O09.523 SUPERVISION OF ELDERLY MULTIGRAVIDA IN THIRD TRIMESTER: Primary | ICD-10-CM

## 2018-01-14 ENCOUNTER — HOSPITAL ENCOUNTER (INPATIENT)
Age: 38
LOS: 1 days | Discharge: HOME | End: 2018-01-15
Attending: OBSTETRICS & GYNECOLOGY | Admitting: OBSTETRICS & GYNECOLOGY
Payer: COMMERCIAL

## 2018-01-14 DIAGNOSIS — R52 POSTPARTUM PAIN: Primary | ICD-10-CM

## 2018-01-14 LAB
A1 MICROGLOB PLACENTAL VAG QL: NEGATIVE
AMPHET UR QL SCN: NEGATIVE
BARBITURATES UR QL SCN: NEGATIVE
BASOPHILS # BLD: 0 K/UL (ref 0–0.1)
BASOPHILS NFR BLD: 0 % (ref 0–1)
BENZODIAZ UR QL: NEGATIVE
CANNABINOIDS UR QL SCN: NEGATIVE
COCAINE UR QL SCN: NEGATIVE
CONTROL LINE PRESENT?: YES
DAILY QC (YES/NO)?: YES
DRUG SCRN COMMENT,DRGCM: NORMAL
EOSINOPHIL # BLD: 0.1 K/UL (ref 0–0.4)
EOSINOPHIL NFR BLD: 1 % (ref 0–7)
ERYTHROCYTE [DISTWIDTH] IN BLOOD BY AUTOMATED COUNT: 18.6 % (ref 11.5–14.5)
EXPIRATION DATE: NORMAL
HCT VFR BLD AUTO: 30.3 % (ref 35–47)
HGB BLD-MCNC: 9.7 G/DL (ref 11.5–16)
INTERNAL NEGATIVE CONTROL: NEGATIVE
KIT LOT NO.: NORMAL
LYMPHOCYTES # BLD: 1.5 K/UL (ref 0.8–3.5)
LYMPHOCYTES NFR BLD: 12 % (ref 12–49)
MCH RBC QN AUTO: 24.9 PG (ref 26–34)
MCHC RBC AUTO-ENTMCNC: 32 G/DL (ref 30–36.5)
MCV RBC AUTO: 77.9 FL (ref 80–99)
METHADONE UR QL: NEGATIVE
MONOCYTES # BLD: 0.5 K/UL (ref 0–1)
MONOCYTES NFR BLD: 4 % (ref 5–13)
NEUTS SEG # BLD: 10.4 K/UL (ref 1.8–8)
NEUTS SEG NFR BLD: 83 % (ref 32–75)
OPIATES UR QL: NEGATIVE
PCP UR QL: NEGATIVE
PH, VAGINAL FLUID: 5.5 (ref 5–6.1)
PLATELET # BLD AUTO: 231 K/UL (ref 150–400)
RBC # BLD AUTO: 3.89 M/UL (ref 3.8–5.2)
WBC # BLD AUTO: 12.5 K/UL (ref 3.6–11)

## 2018-01-14 PROCEDURE — 75410000000 HC DELIVERY VAGINAL/SINGLE: Performed by: OBSTETRICS & GYNECOLOGY

## 2018-01-14 PROCEDURE — 75410000003 HC RECOV DEL/VAG/CSECN EA 0.5 HR: Performed by: OBSTETRICS & GYNECOLOGY

## 2018-01-14 PROCEDURE — 99285 EMERGENCY DEPT VISIT HI MDM: CPT

## 2018-01-14 PROCEDURE — 75410000002 HC LABOR FEE PER 1 HR: Performed by: OBSTETRICS & GYNECOLOGY

## 2018-01-14 PROCEDURE — 74011250637 HC RX REV CODE- 250/637: Performed by: OBSTETRICS & GYNECOLOGY

## 2018-01-14 PROCEDURE — 74011250636 HC RX REV CODE- 250/636: Performed by: OBSTETRICS & GYNECOLOGY

## 2018-01-14 PROCEDURE — 74011250637 HC RX REV CODE- 250/637

## 2018-01-14 PROCEDURE — 36415 COLL VENOUS BLD VENIPUNCTURE: CPT | Performed by: OBSTETRICS & GYNECOLOGY

## 2018-01-14 PROCEDURE — 83986 ASSAY PH BODY FLUID NOS: CPT | Performed by: OBSTETRICS & GYNECOLOGY

## 2018-01-14 PROCEDURE — 80307 DRUG TEST PRSMV CHEM ANLYZR: CPT | Performed by: OBSTETRICS & GYNECOLOGY

## 2018-01-14 PROCEDURE — 10907ZC DRAINAGE OF AMNIOTIC FLUID, THERAPEUTIC FROM PRODUCTS OF CONCEPTION, VIA NATURAL OR ARTIFICIAL OPENING: ICD-10-PCS | Performed by: OBSTETRICS & GYNECOLOGY

## 2018-01-14 PROCEDURE — 59025 FETAL NON-STRESS TEST: CPT

## 2018-01-14 PROCEDURE — 84112 EVAL AMNIOTIC FLUID PROTEIN: CPT | Performed by: OBSTETRICS & GYNECOLOGY

## 2018-01-14 PROCEDURE — 85025 COMPLETE CBC W/AUTO DIFF WBC: CPT | Performed by: OBSTETRICS & GYNECOLOGY

## 2018-01-14 PROCEDURE — 65270000029 HC RM PRIVATE

## 2018-01-14 RX ORDER — ONDANSETRON 2 MG/ML
4 INJECTION INTRAMUSCULAR; INTRAVENOUS
Status: DISCONTINUED | OUTPATIENT
Start: 2018-01-14 | End: 2018-01-15 | Stop reason: HOSPADM

## 2018-01-14 RX ORDER — DIPHENHYDRAMINE HCL 25 MG
25 CAPSULE ORAL
Status: DISCONTINUED | OUTPATIENT
Start: 2018-01-14 | End: 2018-01-15 | Stop reason: HOSPADM

## 2018-01-14 RX ORDER — HYDROMORPHONE HYDROCHLORIDE 2 MG/ML
1 INJECTION, SOLUTION INTRAMUSCULAR; INTRAVENOUS; SUBCUTANEOUS
Status: DISCONTINUED | OUTPATIENT
Start: 2018-01-14 | End: 2018-01-14

## 2018-01-14 RX ORDER — HYDROCORTISONE ACETATE PRAMOXINE HCL 2.5; 1 G/100G; G/100G
CREAM TOPICAL AS NEEDED
Status: DISCONTINUED | OUTPATIENT
Start: 2018-01-14 | End: 2018-01-15 | Stop reason: HOSPADM

## 2018-01-14 RX ORDER — IBUPROFEN 800 MG/1
800 TABLET ORAL EVERY 8 HOURS
Status: DISCONTINUED | OUTPATIENT
Start: 2018-01-14 | End: 2018-01-15 | Stop reason: HOSPADM

## 2018-01-14 RX ORDER — OXYTOCIN/RINGER'S LACTATE 20/1000 ML
125-500 PLASTIC BAG, INJECTION (ML) INTRAVENOUS ONCE
Status: ACTIVE | OUTPATIENT
Start: 2018-01-14 | End: 2018-01-14

## 2018-01-14 RX ORDER — ZOLPIDEM TARTRATE 5 MG/1
5 TABLET ORAL
Status: DISCONTINUED | OUTPATIENT
Start: 2018-01-14 | End: 2018-01-14

## 2018-01-14 RX ORDER — MISOPROSTOL 200 UG/1
TABLET ORAL
Status: COMPLETED
Start: 2018-01-14 | End: 2018-01-14

## 2018-01-14 RX ORDER — NALOXONE HYDROCHLORIDE 0.4 MG/ML
0.4 INJECTION, SOLUTION INTRAMUSCULAR; INTRAVENOUS; SUBCUTANEOUS AS NEEDED
Status: DISCONTINUED | OUTPATIENT
Start: 2018-01-14 | End: 2018-01-15 | Stop reason: HOSPADM

## 2018-01-14 RX ORDER — NALOXONE HYDROCHLORIDE 0.4 MG/ML
0.4 INJECTION, SOLUTION INTRAMUSCULAR; INTRAVENOUS; SUBCUTANEOUS AS NEEDED
Status: DISCONTINUED | OUTPATIENT
Start: 2018-01-14 | End: 2018-01-14 | Stop reason: HOSPADM

## 2018-01-14 RX ORDER — HYDROCODONE BITARTRATE AND ACETAMINOPHEN 7.5; 325 MG/1; MG/1
1 TABLET ORAL
Status: DISCONTINUED | OUTPATIENT
Start: 2018-01-14 | End: 2018-01-15 | Stop reason: HOSPADM

## 2018-01-14 RX ORDER — SODIUM CHLORIDE 0.9 % (FLUSH) 0.9 %
5-10 SYRINGE (ML) INJECTION EVERY 8 HOURS
Status: DISCONTINUED | OUTPATIENT
Start: 2018-01-14 | End: 2018-01-15

## 2018-01-14 RX ORDER — OXYTOCIN IN 5 % DEXTROSE 30/500 ML
999 PLASTIC BAG, INJECTION (ML) INTRAVENOUS ONCE
Status: COMPLETED | OUTPATIENT
Start: 2018-01-14 | End: 2018-01-14

## 2018-01-14 RX ORDER — SODIUM CHLORIDE, SODIUM LACTATE, POTASSIUM CHLORIDE, CALCIUM CHLORIDE 600; 310; 30; 20 MG/100ML; MG/100ML; MG/100ML; MG/100ML
125 INJECTION, SOLUTION INTRAVENOUS CONTINUOUS
Status: DISCONTINUED | OUTPATIENT
Start: 2018-01-14 | End: 2018-01-15 | Stop reason: HOSPADM

## 2018-01-14 RX ORDER — DOCUSATE SODIUM 100 MG/1
100 CAPSULE, LIQUID FILLED ORAL
Status: DISCONTINUED | OUTPATIENT
Start: 2018-01-14 | End: 2018-01-15 | Stop reason: HOSPADM

## 2018-01-14 RX ORDER — MISOPROSTOL 200 UG/1
1000 TABLET ORAL ONCE
Status: COMPLETED | OUTPATIENT
Start: 2018-01-14 | End: 2018-01-14

## 2018-01-14 RX ORDER — SODIUM CHLORIDE 0.9 % (FLUSH) 0.9 %
5-10 SYRINGE (ML) INJECTION AS NEEDED
Status: DISCONTINUED | OUTPATIENT
Start: 2018-01-14 | End: 2018-01-15 | Stop reason: HOSPADM

## 2018-01-14 RX ORDER — SIMETHICONE 80 MG
80 TABLET,CHEWABLE ORAL
Status: DISCONTINUED | OUTPATIENT
Start: 2018-01-14 | End: 2018-01-15 | Stop reason: HOSPADM

## 2018-01-14 RX ADMIN — IBUPROFEN 800 MG: 800 TABLET ORAL at 05:32

## 2018-01-14 RX ADMIN — HYDROCODONE BITARTRATE AND ACETAMINOPHEN 1 TABLET: 7.5; 325 TABLET ORAL at 18:10

## 2018-01-14 RX ADMIN — IBUPROFEN 800 MG: 800 TABLET ORAL at 14:27

## 2018-01-14 RX ADMIN — SODIUM CHLORIDE, SODIUM LACTATE, POTASSIUM CHLORIDE, AND CALCIUM CHLORIDE 1000 ML: 600; 310; 30; 20 INJECTION, SOLUTION INTRAVENOUS at 03:42

## 2018-01-14 RX ADMIN — IBUPROFEN 800 MG: 800 TABLET ORAL at 22:12

## 2018-01-14 RX ADMIN — MISOPROSTOL 800 MCG: 200 TABLET ORAL at 04:09

## 2018-01-14 RX ADMIN — Medication 999 ML/HR: at 04:08

## 2018-01-14 NOTE — IP AVS SNAPSHOT
[address] [facility], [place] 
 
 
 [address] 
[phone] Patient: Shira Hopkins MRN: [id number] LJZ:[id number]12 About your hospitalization You were admitted on:  January 14, 2018 You last received care in the:  [facility] 3 [facility] You were discharged on:  January 15, 2018 Why you were hospitalized Your primary diagnosis was:  Not on File Your diagnoses also included:  Pregnancy Follow-up Information Follow up With Details Comments Contact Info Bridgette Marie, [address] 
[phone] Your Scheduled Appointments Monday January 15, 2018 10:30 AM EST ULTRASOUND with Enoch Rivera ([facility]) [address] [place] [address]  
[phone] Monday January 15, 2018 11:10 AM EST  
OB VISIT with Renee Hernandez MD  
[place] ([facility]) [address] [place] [address]  
[phone] Discharge Orders None A check hortencia indicates which time of day the medication should be taken. My Medications START taking these medications Instructions Each Dose to Equal  
 Morning Noon Evening Bedtime HYDROcodone-acetaminophen 7.5-325 mg per tablet Commonly known as:  Vicki Raj Your last dose was: Your next dose is: Take 1 Tab by mouth every six (6) hours as needed for Pain. Max Daily Amount: 4 Tabs. 1 Tab  
    
   
   
   
  
 ibuprofen 600 mg tablet Commonly known as:  MOTRIN Your last dose was: Your next dose is: Take 1 Tab by mouth every six (6) hours as needed for Pain. 600 mg CONTINUE taking these medications Instructions Each Dose to Equal  
 Morning Noon Evening Bedtime  
 levothyroxine 75 mcg tablet Commonly known as:  SYNTHROID  
 Your last dose was: Your next dose is: Take 2 Tabs by mouth Daily (before breakfast). Indications: HYPOTHYROIDISM  
 150 mcg Where to Get Your Medications Information on where to get these meds will be given to you by the nurse or doctor. ! Ask your nurse or doctor about these medications HYDROcodone-acetaminophen 7.5-325 mg per tablet  
 ibuprofen 600 mg tablet Discharge Instructions POST DELIVERY DISCHARGE INSTRUCTIONS [address] Name: Britney Ennis YOB: 1980 General:  
 
Diet/Diet Restrictions: 
Try to drink eight 8-ounce glasses of fluid daily (water, juices); avoid excessive caffeine intake. Meals/snacks as desired which are high in fiber and carbohydrates and low in fat and cholesterol. Medications:  
 
 
 
Physical Activity / Restrictions / Safety:  
 
Avoid heavy lifting, no more that 10 lbs. for 2-3 weeks; No driving while taking narcotic pain medication. Post  patients should not drive until pain free. No intercourse for 4-6 weeks if you had a vaginal delivery and 6 weeks if you had a  Section. Always use contraception even if you are breastfeeding. No douching or tampon use. May resume exercise in 6 weeks. Discharge Instructions/Special Treatment/Home Care Needs:  
 
Continue prenatal vitamins. Continue to use squirt bottle with warm water on your episiotomy after each bathroom use until bleeding stops. If you had a  Section and if steri-strips were applied to your incision, remove them in 7 days. If they fall off before then it's okay. If you are sent home with staples still on the incision then call the office to make an appointment within the next week to remove them. Take stool softeners (Colace) daily when you first get home and then as needed for constipation. Take them for 3-4 weeks if you had a large vaginal tear. Call your doctor for the following:  
 
Fever over 101 degrees by mouth. Vaginal bleeding heavier than a normal menstrual period or clots larger than a golf ball. Red streaks or increased swelling of legs, painful red streaks on your breast. 
Painful urination, or increased pain, redness or discharge with your incision. Pain Management:  
 
Pain Management:  
Take Acetaminophen (Tylenol) or Ibuprofen (Advil, Motrin) as directed for pain. Take perscribed narcotic meds if over the counter meds are not adequately controlling your pain. Use a warm Sitz bath 3 times daily to relieve episiotomy or hemorrhoidal discomfort. A heating pad applied to your lower abdomen can help relieve  incision pain as well as uterine cramps from delivery. For hemorrhoidal discomfort use Tucks pads and Anusol cream/Preparation H as needed and directed. Follow-Up Care:  
 
Unless instructed otherwise, make an appointment for 6 weeks after delivery. Telephone number: [phone] Signed By: Mary Bolden MD                                                                                                   Date: 1/15/2018 Time: 8:20 AM 
 
  
  
  
[employer] Announcement We are excited to announce that we are making your provider's discharge notes available to you in [employer]. You will see these notes when they are completed and signed by the physician that discharged you from your recent hospital stay. If you have any questions or concerns about any information you see in HIRO Media, please call the Health Information Department where you were seen or reach out to your Primary Care Provider for more information about your plan of care. Introducing [facility] & HEALTH SERVICES! Dear Reyna Garcia: Thank you for requesting a [employer] account. Our records indicate that you already have an active [employer] account. You can access your account anytime at [email or web address] Did you know that you can access your hospital and ER discharge instructions at any time in BookingBug? You can also review all of your test results from your hospital stay or ER visit. Additional Information If you have questions, please visit the Frequently Asked Questions section of the BookingBug website at [email or web address] Remember, BlackSquarehart is NOT to be used for urgent needs. For medical emergencies, dial 911. Now available from your iPhone and Android! Providers Seen During Your Hospitalization Provider Specialty Primary office phone Cecilia Vincent MD Obstetrics & Gynecology [phone] Your Primary Care Physician (PCP) Primary Care Physician Office Phone Office Fax Shannon Janes [phone] You are allergic to the following No active allergies Recent Documentation Height Weight Breastfeeding? BMI OB Status Smoking Status 1.524 m 61.2 kg Unknown 26.37 kg/m2 Recent pregnancy Current Every Day Smoker Emergency Contacts Name Discharge Info Relation Home Work Mobile Bandar Conn  Spouse [3] [phone] Patient Belongings The following personal items are in your possession at time of discharge: 
  Dental Appliances: None  Visual Aid: None      Home Medications: None   Jewelry: Body Piercing, Ring (tongue ring, nose ring, multiple eyebrow rings)  Clothing: At bedside, Footwear, Pants, Shirt, With patient, Undergarments, Socks, Jacket/Coat    Other Valuables: At bedside, Cell Phone, [address], Personal electronic devices (comment), Purse, Wallet, Personal toiletries, With patient, Suitcase  Personal Items Sent to Safe: none Please provide this summary of care documentation to your next provider. Signatures-by signing, you are acknowledging that this After Visit Summary has been reviewed with you and you have received a copy. Patient Signature:  ____________________________________________________________ Date:  ____________________________________________________________  
  
Sharlyne Yahir Provider Signature:  ____________________________________________________________ Date:  ____________________________________________________________

## 2018-01-14 NOTE — LACTATION NOTE
This note was copied from a baby's chart. Mother resting in bed. Mother is a . Mother states she has mainly formula fed in the past but plans to do both breast and formula with this child. Mother states she has breast fed a few of her children. BF basics reviewed. Discussed with mother her plan for feeding. Reviewed the benefits of exclusive breast milk feeding during the hospital stay. Informed her of the risks of using formula to supplement in the first few days of life as well as the benefits of successful breast milk feeding; referred her to the Breastfeeding booklet about this information. She acknowledges understanding of information reviewed and states that it is her plan to breat and formula feed her infant. Will support her choice and offer additional information as needed. Reviewed breastfeeding basics:  How milk is made and normal  breastfeeding behaviors discussed. Supply and demand,  stomach size, early feeding cues, skin to skin bonding with comfortable positioning and baby led latch-on reviewed. Pt chooses to do both breast and bottle feeding, will follow recommendations to breastfeed first to help establish milk supply and only top off with formula, if needed, will be educated on potential consequences of early supplementation on breastfeeding success, but will be supported in decision to do bot    Pt will successfully establish breastfeeding by feeding in response to early feeding cues   or wake every 3h, will obtain deep latch, and will keep log of feedings/output. Taught to BF at hunger cues and or q 2-3 hrs and to offer 10-20 drops of hand expressed colostrum at any non-feeds.       Breast Assessment  Left Breast: Medium  Left Nipple: Everted, Intact  Right Breast: Medium  Right Nipple: Everted, Intact  Breast- Feeding Assessment  Attends Breast-Feeding Classes: No  Breast-Feeding Experience: Yes (has breast and formula fed )  Breast Trauma/Surgery: No  Type/Quality: Good  Lactation Consultant Visits  Breast-Feedings: Good   Mother/Infant Observation  Mother Observation: Breast comfortable (did not see baby at breast)  Infant Observation: Opens mouth, Feeding cues

## 2018-01-14 NOTE — PROGRESS NOTES
Call to Dr Robledo Oajohn regarding pt arrival, sve of 10/100/-2 with bulging bag. MD states \"I don't think I can make it, please ask the hospitalist to cover. \"    [id number]  Call to Dr Shannan Lowe MD made aware of pt status and Dr Robledo Oajohn request that she cover this patient.

## 2018-01-14 NOTE — ROUTINE PROCESS
Bedside and Verbal shift change report given to Gokul Naidu RN (oncoming nurse) by Margarito Wilson RN (offgoing nurse). Report included the following information SBAR, Kardex, Intake/Output, MAR and Accordion.

## 2018-01-14 NOTE — L&D DELIVERY NOTE
Delivery Summary    Patient: Josh Garcia MRN: [id number]  SSN: [id number]    YOB: 1980  Age: 40 y.o. Sex: female        Labor Events:    Labor: No    Rupture Date: 2018    Rupture Time: 4:04 AM    Rupture Type AROM    Amniotic Fluid Volume:      Amniotic Fluid Description: Clear  None    Induction: None        Augmentation: None    Labor Complications: None     Additional Complications:        Cervical Ripening:       None      Delivery Events:  Episiotomy: None    Laceration(s): None      Repaired: None     Number of Repair Packets: 0    Suture Type and Size:         Estimated Blood Loss (ml): 100        Information for the patient's :  Michelle Mahoney Female [[id number]]     Delivery Summary - Baby    Delivery Date: 2018   Delivery Time: 4:06 AM   Delivery Type: Vaginal, Spontaneous Delivery  Sex:  female  Gestational Age: 39w6d  Delivery Clinician:  Kenyatta Steven  Living?: Living   Delivery Location: L&D             APGARS  One minute Five minutes Ten minutes   Skin Color: 1    1       Heart Rate: 2   2         Reflex Irritability: 2   2         Muscle Tone: 2   2       Respiration: 2   2         Total: 9   9           Presentation: Vertex  Position: Left Occiput Anterior  Resuscitation Method:  Suctioning-bulb; Tactile Stimulation     Meconium Stained: None    Cord Information: 3 Vessels   Complications: None  Cord Blood Sent?:  No    Blood Gases Sent?:  No    Placenta:  Date/Time:   4:08 AM  Removal: Spontaneous      Appearance: Normal;Intact      Measurements:  Birth Weight: 3.135 kg    Birth Length: 50.8 cm   Head Circumference: 34.5 cm     Chest Circumference: 33.5 cm    Abdominal Girth: 31.5 cm    Other Providers:   YAN SALINAS;REECE DALTON;EILEEN HATCH;TOO HEARN;LINA IBARRA;GRETCHEN YOUNG;SYBIL PAIGE Obstetrician;Primary Nurse;Primary [place] Nurse;Charge Nurse;Nursery Nurse;Tech;Tech           Cord Blood Results:  Information for the patient's :  Neal Russo, Female [[id number]]   No results found for: Sherry Ou, PCTDIG, BILI, ABORHEXT, [address]    Information for the patient's :  Neal Russo, Female [[id number]]   No results found for: APH, APCO2, APO2, AHCO3, ABEC, ABDC, O2ST, [place], [place], PHI, Trista, [place], [place], SO2I, IBD     Information for the patient's :  Neal Russo, Female [[id number]]   No results found for: EPHV, PCO2V, PO2V, HCO3V, O2STV, EBDV    Additional Comments:  Amniotomy performed with a copious amount of clear amniotic fluid noted. Uncomplicated  of a term live female infant in OA presentation with apgars of 9,9 over an intact perineum. The placenta delivered spontaneously and intact.  ml.   800 micrograms of rectal cytotec administered for uterine atony prophylaxis.

## 2018-01-14 NOTE — H&P
History & Physical    Name: Homero Castillo MRN: [id number]  SSN: [id number]    YOB: 1980  Age: 40 y.o. Sex: female        Subjective:     Estimated Date of Delivery: 1/15/18  OB History    Para Term  AB Living   8 7 7 0 0 7   SAB TAB Ectopic Molar Multiple Live Births   0 0 0  0 7      # Outcome Date GA Lbr Miguel/2nd Weight Sex Delivery Anes PTL Lv   8 Current            7 Term 04/10/14 40w5d 00:48 / 00:01 3.55 kg M VAGINAL DELI None N TRIP   6 Term 12 39w5d 07:57 / 00:05 3.95 kg M VAGINAL DELI None N TRIP   5 Term 11 40w6d   F VAGINAL DELI None N TRIP   4 Term 06    M VAGINAL DELI None N TRIP   3 Term 05    M VAGINAL DELI None N TRIP   2 Term 04    F VAGINAL DELI EPI N TRIP   1 Term      Vag-Spont   TRIP          Ms. Nguyen Cuellar is G8  admitted with pregnancy at 39w6d admitted in the second stage of labor. The patient presents with regular uterine contractions and the urge to push. Denies vaginal bleeding and leakage of vaginal fluid. Prenatal course complicated by anemia, tobacco use, grand multiparity, hypothyroidism, depression and fetus with short femurs. Please see prenatal records for details. Past Medical History:   Diagnosis Date    Acquired hypothyroidism     hypothyroidism - takes 75 mcg synthroid    Anemia     Depression     pt reports to RN \"when I was in my teens\"    Tobacco abuse      History reviewed. No pertinent surgical history.   Social History     Occupational History     Not Employed     Social History Main Topics    Smoking status: Current Every Day Smoker     Packs/day: 0.30     Years: 20.00     Types: Cigarettes     Last attempt to quit: 2016    Smokeless tobacco: Never Used      Comment: Smokes 3-5 cigarettes a day depending on the day    Alcohol use No    Drug use: No    Sexual activity: Yes     Partners: Male     Birth control/ protection: None     Family History   Problem Relation Age of Onset    Cancer Maternal Aunt  Diabetes Maternal Uncle     Cancer Maternal Grandfather     Heart Disease Mother        No Known Allergies  Prior to Admission medications    Medication Sig Start Date End Date Taking? Authorizing Provider   levothyroxine (SYNTHROID) 75 mcg tablet Take 2 Tabs by mouth Daily (before breakfast). Indications: HYPOTHYROIDISM 10/7/16   Deena Magaña MD        Review of Systems: Pertinent items are noted in HPI. Objective:     Vitals:  Vitals:    18 0429 18 0431 18 0436 18 043   BP: 132/75      Pulse: 98      Resp:       Temp:       SpO2:  100% 100% (!) 81%   Weight:       Height:            Physical Exam:  Perineum: blood absent, amniotic fluid absent  Cervical Exam: 10/100/-2 vtx, large bbow  Membranes:  Artificial Rupture of Membranes;  Amniotic Fluid: copius amount of clear fluid  Fetal Heart Rate: Baseline: 135 per minute  Variability: moderate  Accelerations: no  Decelerations: early  Uterine contractions: regular, every 1-2 minutes    Prenatal Labs:   Lab Results   Component Value Date/Time    ABO/Rh(D) A POS 2012 06:00 PM    Rubella, External immune 2016    GrBStrep, External negative 2017    HBsAg, External negative 2016    HIV, External non-reactive 2016    RPR, External non reactive 2012    Gonorrhea, External negative 2016    Chlamydia, External negative 2016    ABO,Rh A positive 2016         Assessment/Plan:    at 45 6/7 wks in the second stage of labor  -prepare for     Signed By:  Orion Fallon MD     2018

## 2018-01-14 NOTE — PROGRESS NOTES
S/ no complaints, voiding well    O/  VSS, AF          Abdomen NT, fundus firm    A/  Postpartum day 0 , stable    P/  Routine care

## 2018-01-14 NOTE — IP AVS SNAPSHOT
[address] [place] [address] 104 1007 [facility] 
[phone] Patient: Katelyn Jackson MRN: [id number] SXP:[id number]/6302 A check hortencia indicates which time of day the medication should be taken. My Medications START taking these medications Instructions Each Dose to Equal  
 Morning Noon Evening Bedtime HYDROcodone-acetaminophen 7.5-325 mg per tablet Commonly known as:  Yessi Pont Your last dose was: Your next dose is: Take 1 Tab by mouth every six (6) hours as needed for Pain. Max Daily Amount: 4 Tabs. 1 Tab  
    
   
   
   
  
 ibuprofen 600 mg tablet Commonly known as:  MOTRIN Your last dose was: Your next dose is: Take 1 Tab by mouth every six (6) hours as needed for Pain. 600 mg CONTINUE taking these medications Instructions Each Dose to Equal  
 Morning Noon Evening Bedtime  
 levothyroxine 75 mcg tablet Commonly known as:  SYNTHROID Your last dose was: Your next dose is: Take 2 Tabs by mouth Daily (before breakfast). Indications: HYPOTHYROIDISM  
 150 mcg Where to Get Your Medications Information on where to get these meds will be given to you by the nurse or doctor. ! Ask your nurse or doctor about these medications HYDROcodone-acetaminophen 7.5-325 mg per tablet  
 ibuprofen 600 mg tablet

## 2018-01-14 NOTE — ROUTINE PROCESS
SBAR IN Report: Mother    Verbal report received from [facility] (full name & credentials) on this patient, who is now being transferred from L&D (unit) for routine progression of care. Report consisted of patient's Situation, Background, Assessment and Recommendations (SBAR).  ID bands were compared with the identification form, and verified with the patient and transferring nurse. Information from the SBAR, Kardex, Intake/Output, MAR and Accordion and the Wang Report was reviewed with the transferring nurse; opportunity for questions and clarification provided.

## 2018-01-14 NOTE — PROGRESS NOTES
0357  Pt involuntarily bearing down. GLO Rodgers leaves the bedside to notify Dr. Sruthi New. GLO Covarrubias remains at bedside with pt encouraging pt to breathe through ctx and to not bear down and push until the MD is at the bedside. 0758  Dr. Sruthi New at bedside for delivery. 0401  VORB from Dr. Sruthi New to give pt 1000mcg misoprostil rectally prophylactically for post partum hemorrhage. No additional orders received. 0403  Pt begins pushing. Dr. Sruthi New remains at bedside for delivery. 0404  AROM by Dr. Sruthi New. Large amount of clear amniotic fluid noted at time of AROM. MD remains at bedside. 0406  Time of delivery. 18  Dr. Sruthi New states to GLO Covarrubias that she will administer 800mcg of misoprostil rectally now. RN asks MD if she wants 1000mcg of misoprostil as ordered before, and MD made aware that 1000mcg is available at the bedside. Myles Grullon from MD that she will administer 800mcg misoprostil now instead of 1000mcg. 0238  Dr. Sruthi New inserting 800mcg misoprostil rectally at this time post-delivery. 0429  Pt and pt's  educated to hit the red nurse button on their call bell when pt needs to get OOB for any reason, especially needing to use the BR for at least the first two times post partum. Pt asks RN \"I can't just get up and take a shower now? \" RN educated pt and pt's  on the importance of calling out on the call bell, and educated that the pt needs a nurse with her at least the first two times getting OOB to use the BR, or for any reason, and that the pt will need to void successfully on her own in the BR twice after ambulating to the BR with an RN before she will be able to get OOB and shower. Pt and pt's  verbalize understanding. Call bell within reach.

## 2018-01-15 VITALS
BODY MASS INDEX: 26.5 KG/M2 | RESPIRATION RATE: 14 BRPM | HEART RATE: 67 BPM | HEIGHT: 60 IN | WEIGHT: 135 LBS | SYSTOLIC BLOOD PRESSURE: 112 MMHG | DIASTOLIC BLOOD PRESSURE: 67 MMHG | OXYGEN SATURATION: 100 % | TEMPERATURE: 98.3 F

## 2018-01-15 PROCEDURE — 74011250637 HC RX REV CODE- 250/637: Performed by: OBSTETRICS & GYNECOLOGY

## 2018-01-15 RX ORDER — HYDROCODONE BITARTRATE AND ACETAMINOPHEN 7.5; 325 MG/1; MG/1
1 TABLET ORAL
Qty: 10 TAB | Refills: 0 | Status: SHIPPED | OUTPATIENT
Start: 2018-01-15 | End: 2019-04-30

## 2018-01-15 RX ORDER — IBUPROFEN 600 MG/1
600 TABLET ORAL
Qty: 30 TAB | Refills: 1 | Status: SHIPPED | OUTPATIENT
Start: 2018-01-15 | End: 2020-03-30

## 2018-01-15 RX ADMIN — IBUPROFEN 800 MG: 800 TABLET ORAL at 06:24

## 2018-01-15 NOTE — ROUTINE PROCESS
Bedside and Verbal shift change report given to Vani Buckner (oncoming nurse) by Alicia Martinez RN (offgoing nurse). Report included the following information SBAR, Kardex, Intake/Output, MAR and Accordion.

## 2018-01-15 NOTE — PROGRESS NOTES
S/ no complaints, voiding well    O/  VSS, AF          Abdomen NT, fundus firm    A/  Postpartum day 1 , stable    P/  Routine care, She wants to go home today, given instructions and pain meds

## 2018-01-15 NOTE — ROUTINE PROCESS
Bedside and Verbal shift change report given to Stuart Mcclain RN (oncoming nurse) by Mervat Barba RN (offgoing nurse). Report included the following information SBAR, Kardex, Intake/Output and MAR.

## 2018-01-15 NOTE — DISCHARGE SUMMARY
Obstetrical Discharge Summary     Name: Kera Lange MRN: [id number]  SSN: [id number]    YOB: 1980  Age: 40 y.o. Sex: female      Admit Date: 2018    Discharge Date: 1/15/2018     Admitting Physician: Mckay Sumner MD     Attending Physician:  Mckay Sumner MD     Admission Diagnoses: Pregnancy;Pregnancy    Discharge Diagnoses:   Information for the patient's :  Ezequiel Armstrong Female [[id number]]   Delivery of a 6 lb 14.6 oz (3.135 kg) female infant via Vaginal, Spontaneous Delivery on 2018 at 4:06 AM  by . Apgars were 9 and 9. Additional Diagnoses:   Hospital Problems  Date Reviewed: 2018          Codes Class Noted POA    Pregnancy ICD-10-CM: Z34.90  ICD-9-CM: V22.2  2017 Unknown    Overview Addendum 2017  9:52 AM by Mckay Sumner MD     Use LMP for  [facility] Multip  Hypothyroid  AMA  NT normal  AFP negative  Short femur and declines further testing  Flu shot 10/23/17  Anemic  Tdap 2017  GBS neg                    Lab Results   Component Value Date/Time    Rubella, External immune 2016    GrBStrep, External negative 2017       Hospital Course: Normal hospital course following the delivery. Condition on discharge: stable  Disposition: Home  Patient Instructions:   Current Discharge Medication List      START taking these medications    Details   HYDROcodone-acetaminophen (NORCO) 7.5-325 mg per tablet Take 1 Tab by mouth every six (6) hours as needed for Pain. Max Daily Amount: 4 Tabs. Qty: 10 Tab, Refills: 0    Associated Diagnoses: Postpartum pain      ibuprofen (MOTRIN) 600 mg tablet Take 1 Tab by mouth every six (6) hours as needed for Pain. Qty: 30 Tab, Refills: 1         CONTINUE these medications which have NOT CHANGED    Details   levothyroxine (SYNTHROID) 75 mcg tablet Take 2 Tabs by mouth Daily (before breakfast). Indications: HYPOTHYROIDISM  Qty: 60 Tab, Refills: 4             Reference my discharge instructions.     Follow-up Appointments   Procedures    FOLLOW UP VISIT Appointment in: 6 Weeks     Standing Status:   Standing     Number of Occurrences:   1     Order Specific Question:   Appointment in     Answer:   6 Weeks        Signed By:  Roderick Galan MD     January 15, 2018

## 2018-01-15 NOTE — DISCHARGE INSTRUCTIONS
[address] [facility]  Name: Sanjeev Garza  YOB: 1980      General:     Diet/Diet Restrictions:  Try to drink eight 8-ounce glasses of fluid daily (water, juices); avoid excessive caffeine intake. Meals/snacks as desired which are high in fiber and carbohydrates and low in fat and cholesterol. Medications:         Physical Activity / Restrictions / Safety:     Avoid heavy lifting, no more that 10 lbs. for 2-3 weeks; No driving while taking narcotic pain medication. Post  patients should not drive until pain free. No intercourse for 4-6 weeks if you had a vaginal delivery and 6 weeks if you had a  Section. Always use contraception even if you are breastfeeding. No douching or tampon use. May resume exercise in 6 weeks. Discharge Instructions/Special Treatment/Home Care Needs:     Continue prenatal vitamins. Continue to use squirt bottle with warm water on your episiotomy after each bathroom use until bleeding stops. If you had a  Section and if steri-strips were applied to your incision, remove them in 7 days. If they fall off before then it's okay. If you are sent home with staples still on the incision then call the office to make an appointment within the next week to remove them. Take stool softeners (Colace) daily when you first get home and then as needed for constipation. Take them for 3-4 weeks if you had a large vaginal tear. Call your doctor for the following:     Fever over 101 degrees by mouth. Vaginal bleeding heavier than a normal menstrual period or clots larger than a golf ball. Red streaks or increased swelling of legs, painful red streaks on your breast.  Painful urination, or increased pain, redness or discharge with your incision. Pain Management:     Pain Management:   Take Acetaminophen (Tylenol) or Ibuprofen (Advil, Motrin) as directed for pain.   Take perscribed narcotic meds if over the counter meds are not adequately controlling your pain. Use a warm Sitz bath 3 times daily to relieve episiotomy or hemorrhoidal discomfort. A heating pad applied to your lower abdomen can help relieve  incision pain as well as uterine cramps from delivery. For hemorrhoidal discomfort use Tucks pads and Anusol cream/Preparation H as needed and directed. Follow-Up Care:     Unless instructed otherwise, make an appointment for 6 weeks after delivery.   Telephone number: [phone]    Signed By: Mckay Sumner MD                                                                                                   Date: 1/15/2018 Time: 8:20 AM

## 2018-02-26 ENCOUNTER — OFFICE VISIT (OUTPATIENT)
Dept: OBGYN CLINIC | Age: 38
End: 2018-02-26

## 2018-02-26 VITALS
SYSTOLIC BLOOD PRESSURE: 127 MMHG | RESPIRATION RATE: 19 BRPM | BODY MASS INDEX: 21.99 KG/M2 | DIASTOLIC BLOOD PRESSURE: 75 MMHG | HEART RATE: 90 BPM | HEIGHT: 60 IN | WEIGHT: 112 LBS

## 2018-02-26 NOTE — PROGRESS NOTES
Postpartum evaluation    César Alcantar is a 40 y.o. female who presents for a postpartum exam.     She is now six weeks post normal spontaneous vaginal delivery. Her baby is doing well. She has had no menses since delivery. She has had the following significant problems since her delivery: none    The patient is bottle feeding without difficulty. The patient would like to not use birth control. She is currently taking: no medications. She is due for her next AE in 6 months.      Visit Vitals    /75 (BP 1 Location: Left arm, BP Patient Position: Sitting)    Pulse 90    Resp 19    Ht 5' (1.524 m)    Wt 112 lb (50.8 kg)    BMI 21.87 kg/m2       PHYSICAL EXAMINATION    Constitutional  · Appearance: well-nourished, well developed, alert, in no acute distress    HENT  · Head and Face: appears normal    Neck  · Inspection/Palpation: normal appearance, no masses or tenderness  · Lymph Nodes: no lymphadenopathy present  · Thyroid: gland size normal, nontender, no nodules or masses present on palpation    Gastrointestinal  · Abdominal Examination: abdomen non-tender to palpation, normal bowel sounds, no masses present  · Liver and spleen: no hepatomegaly present, spleen not palpable  · Hernias: no hernias identified    Genitourinary  · External Genitalia: normal appearance for age, no discharge present, no tenderness present, no inflammatory lesions present, no masses present, no atrophy present  · Vagina: normal vaginal vault without central or paravaginal defects, no discharge present, no inflammatory lesions present, no masses present  · Bladder: non-tender to palpation  · Urethra: appears normal  · Cervix: normal   · Uterus: normal size, shape and consistency  · Adnexa: no adnexal tenderness present, no adnexal masses present  · Perineum: perineum within normal limits, no evidence of trauma, no rashes or skin lesions present  · Anus: anus within normal limits, no hemorrhoids present  · Inguinal Lymph Nodes: no lymphadenopathy present    Skin  · General Inspection: no rash, no lesions identified    Neurologic/Psychiatric  · Mental Status:  · Orientation: grossly oriented to person, place and time  · Mood and Affect: mood normal, affect appropriate    Assessment:  Normal postpartum check    Plan:  RTO for AE. Rx for contraception: none. Advised of risks of short interval conception and encouraged to use BC and take PNV.

## 2019-04-17 ENCOUNTER — OFFICE VISIT (OUTPATIENT)
Dept: FAMILY MEDICINE CLINIC | Age: 39
End: 2019-04-17

## 2019-04-17 VITALS
WEIGHT: 118.2 LBS | TEMPERATURE: 97.9 F | OXYGEN SATURATION: 100 % | SYSTOLIC BLOOD PRESSURE: 119 MMHG | RESPIRATION RATE: 16 BRPM | DIASTOLIC BLOOD PRESSURE: 79 MMHG | HEIGHT: 60 IN | HEART RATE: 82 BPM | BODY MASS INDEX: 23.2 KG/M2

## 2019-04-17 DIAGNOSIS — Z72.0 TOBACCO ABUSE: ICD-10-CM

## 2019-04-17 DIAGNOSIS — R07.89 CHEST WALL PAIN: Primary | ICD-10-CM

## 2019-04-17 DIAGNOSIS — E03.9 HYPOTHYROIDISM, UNSPECIFIED TYPE: ICD-10-CM

## 2019-04-17 NOTE — PROGRESS NOTES
Chief Complaint   Patient presents with    Chest Pain     left sided chest pain x 1 month. No trauma. Painful when raising arm, coughing and breathing. 1. Have you been to the ER, urgent care clinic since your last visit? Hospitalized since your last visit? No    2. Have you seen or consulted any other health care providers outside of the [facility] since your last visit? Include any pap smears or colon screening.   No

## 2019-04-17 NOTE — PATIENT INSTRUCTIONS

## 2019-04-17 NOTE — PROGRESS NOTES
Marni Treadwell is a 41 y.o. female    Issues discussed today include: who presents with 1-2 months L sided chest wall pain. Pain worsens when she coughs. Feels like a bruise sometimes and pins and needles sometimes  Pt denies any trauma to area. Pt stays home with 2 children at 16 months and 2 years that she picks up often. Pt does not report an increase in her cough just dry cough. Pt smokes 1/2 a pack a day. Pt reports pain can wrap arround shoulder at times. Pt denies any fevers, chills, night sweats, SOB, n/v/d. Pt is suppose to take 75 mcg of synthroid but has not been able to follow up so has been out of medication for over a year       Data reviewed or ordered today:       Other problems include:  Patient Active Problem List   Diagnosis Code    Hypothyroid E03.9    Pregnancy Z34.90       Medications:  Current Outpatient Medications   Medication Sig Dispense Refill    HYDROcodone-acetaminophen (NORCO) 7.5-325 mg per tablet Take 1 Tab by mouth every six (6) hours as needed for Pain. Max Daily Amount: 4 Tabs. 10 Tab 0    ibuprofen (MOTRIN) 600 mg tablet Take 1 Tab by mouth every six (6) hours as needed for Pain. 30 Tab 1    levothyroxine (SYNTHROID) 75 mcg tablet Take 2 Tabs by mouth Daily (before breakfast). Indications: HYPOTHYROIDISM 60 Tab 4       Allergies:  No Known Allergies   Patient's last menstrual period was 04/10/2019 (exact date).     Social History     Socioeconomic History    Marital status:      Spouse name: Chaparrita Souza Number of children: 11    Years of education: Not on file    Highest education level: Not on file   Occupational History     Employer: NOT EMPLOYED   Social Needs    Financial resource strain: Not on file    Food insecurity:     Worry: Not on file     Inability: Not on file    Transportation needs:     Medical: Not on file     Non-medical: Not on file   Tobacco Use    Smoking status: Current Every Day Smoker     Packs/day: 0.30     Years: 20.00     Pack years: 6.00 Types: Cigarettes     Last attempt to quit: 1/29/2016     Years since quitting: 3.2    Smokeless tobacco: Never Used    Tobacco comment: Smokes 3-5 cigarettes a day depending on the day   Substance and Sexual Activity    Alcohol use: No     Alcohol/week: 0.0 oz    Drug use: No    Sexual activity: Yes     Partners: Male     Birth control/protection: None   Lifestyle    Physical activity:     Days per week: Not on file     Minutes per session: Not on file    Stress: Not on file   Relationships    Social connections:     Talks on phone: Not on file     Gets together: Not on file     Attends Sikhism service: Not on file     Active member of club or organization: Not on file     Attends meetings of clubs or organizations: Not on file     Relationship status: Not on file    Intimate partner violence:     Fear of current or ex partner: Not on file     Emotionally abused: Not on file     Physically abused: Not on file     Forced sexual activity: Not on file   Other Topics Concern    Not on file   Social History Narrative    Not on file       Family History   Problem Relation Age of Onset    Cancer Maternal Aunt     Diabetes Maternal Uncle     Cancer Maternal Grandfather     Heart Disease Mother            ROS:  Fever: no  Weight loss: no  Headaches:  no  Chest Pain:  no  SOB:  no  Cough:  no      Physical Exam  Visit Vitals  /79 (BP 1 Location: Left arm, BP Patient Position: Sitting)   Pulse 82   Temp 97.9 °F (36.6 °C) (Oral)   Resp 16   Ht 5' (1.524 m)   Wt 118 lb 3.2 oz (53.6 kg)   LMP 04/10/2019 (Exact Date)   SpO2 100%   BMI 23.08 kg/m²     Constitutional: Appears well,  No acute distress, Vitals noted  Psychiatric:  Affect normal, Alert and Oriented to person/place/time  Eyes:  Pupils equally round and reactive, EOMI, conjunctiva clear  ENT: External ears and nose normal, Teeth and gums appear healthy, Mucous membranes, TMs and ear canal without erythema or edema, Orophyarynx clear  Neck: General inspection and Thyroid normal.  No abnormal cervical or supraclavicular nodes. Lungs: Clear to auscultation, good respiratory effort, no wheezes, rales or rhonchi  Heart:  Normal HR, Normal S1 and S2,  Regular rhythm. No cardiac murmurs. No carotid bruits. Sternum/rib  prominent on left side, mild TTP   MSK: R shoulder with full ROM and 5/5 strength. Negative empty can test, Neer's/Zeng' test. Dose note pain with adduction over L sternum bone. Skin:  Warm to palpation, without rashes, bruising, or suspicious lesions       BP Readings from Last 3 Encounters:   04/17/19 119/79   02/26/18 127/75   01/15/18 112/67       Rib XR: I personally reviewed XR; no fractures or acute abnormalities seen     Assessment/Plan:      ICD-10-CM ICD-9-CM    1. Chest wall pain R07.89 786.52 XR RIBS LT W PA CXR MIN 3 V      CANCELED: XR RIBS BI W PA CHEST 4 VS   2. Hypothyroidism, unspecified type E03.9 244.9      1. Chest wall pain likely msk related muscle strain vs costochondritis   - XR RIBS LT W PA CXR MIN 3 V; Future- negative for any fractures   - Rest with alternating ice/heat   - Ibuprofen 600mg q6 prn   - If no improvement follow up in sports medicine clinic for possible steroid injection     2. Hypothyroidism, unspecified type   - follow up appointment for TSH recheck and medication management     3. Tobacco abuse  - counseling on smoking cessation     Orders Placed This Encounter    XR RIBS LT W PA CXR MIN 3 V     Standing Status:   Future     Number of Occurrences:   1     Standing Expiration Date:   5/17/2020     Order Specific Question:   Reason for Exam     Answer:   L sided chest pain with prominent sternum     Order Specific Question:   Is Patient Pregnant?      Answer:   No         Jim George MD  [address] [facility]

## 2019-04-19 NOTE — PROGRESS NOTES
I reviewed with the resident the medical history and the resident's findings on the physical examination. I discussed with the resident the patient's diagnosis and concur with the plan.     Agree with exam findings  Chest asymmetric with more prominent left side likely due to scoliosis   Likely costochondritis based on hx  Agree with plan as outlined

## 2019-04-29 ENCOUNTER — OFFICE VISIT (OUTPATIENT)
Dept: FAMILY MEDICINE CLINIC | Age: 39
End: 2019-04-29

## 2019-04-29 VITALS
HEART RATE: 78 BPM | BODY MASS INDEX: 23.36 KG/M2 | HEIGHT: 60 IN | RESPIRATION RATE: 18 BRPM | SYSTOLIC BLOOD PRESSURE: 115 MMHG | TEMPERATURE: 98.9 F | WEIGHT: 119 LBS | OXYGEN SATURATION: 100 % | DIASTOLIC BLOOD PRESSURE: 74 MMHG

## 2019-04-29 DIAGNOSIS — Z72.0 TOBACCO ABUSE: ICD-10-CM

## 2019-04-29 DIAGNOSIS — Z13.220 SCREENING, LIPID: ICD-10-CM

## 2019-04-29 DIAGNOSIS — Z13.31 DEPRESSION SCREEN: ICD-10-CM

## 2019-04-29 DIAGNOSIS — K08.9 POOR DENTITION: ICD-10-CM

## 2019-04-29 DIAGNOSIS — E03.9 HYPOTHYROIDISM, UNSPECIFIED TYPE: ICD-10-CM

## 2019-04-29 DIAGNOSIS — E04.1 THYROID NODULE: ICD-10-CM

## 2019-04-29 DIAGNOSIS — D64.9 ANEMIA, UNSPECIFIED TYPE: ICD-10-CM

## 2019-04-29 DIAGNOSIS — R07.9 CHEST PAIN, UNSPECIFIED TYPE: Primary | ICD-10-CM

## 2019-04-29 NOTE — PROGRESS NOTES
Subjective  Hernandez Arshad is an 41 y.o. female who presents for follow -up CP and needs labs. PMHx hypothyroid, anemia (9.7 18), tobacco abuse, levoscoliosis of the thoracolumbar spine. Pt states she was seen in office  for chest wall pain because of muscle strain. Taking ibuprofen daily without SE. Left sided chest pain not improved. States pain is sharp and causes her SOB. Pain 5/10 at worse. Radiate to the left shoulder and down the left arm. Pt has been smoking (approx 1 PPD) since the age on 9. Mother passed away 2/2 heart attack 6 years ago at age of 65 yo. Pt is suppose to take 75 mcg of synthroid but has not been able to follow up so has been out of medication for over a year. She admits to never been good at taking the medication. States that after she stopped taking medication she has gained wait. Diet balanced. Does not hydrate as she should as she does not like the taste of water. Pt denies any fevers, chills, night sweats, SOB, n/v/d, SI/HI    She is a  . Pap smear 16 nml pap with negative HPV. LMP 4/10. Allergies - reviewed:   No Known Allergies      Medications - reviewed:   Current Outpatient Medications   Medication Sig    ibuprofen (MOTRIN) 600 mg tablet Take 1 Tab by mouth every six (6) hours as needed for Pain.  levothyroxine (SYNTHROID) 75 mcg tablet Take 2 Tabs by mouth Daily (before breakfast). Indications: HYPOTHYROIDISM     No current facility-administered medications for this visit. Past Medical History - reviewed:  Past Medical History:   Diagnosis Date    Acquired hypothyroidism     hypothyroidism - takes 75 mcg synthroid    Anemia     Anemia affecting pregnancy in third trimester     Depression     pt reports to RN \"when I was in my teens\"    Tobacco abuse          Past Surgical History - reviewed:   History reviewed. No pertinent surgical history.       Social History - reviewed:  Social History     Socioeconomic History    Marital status:      Spouse name: Alla Carvalho Number of children: 8   Tobacco Use    Smoking status: Current Every Day Smoker     Packs/day: 0.30     Years: 20.00     Pack years: 6.00     Types: Cigarettes     Last attempt to quit: 1/29/2016     Years since quitting: 3.2    Smokeless tobacco: Never Used    Tobacco comment: Smokes 3-5 cigarettes a day depending on the day   Substance and Sexual Activity    Alcohol use: No     Alcohol/week: 0.0 oz    Drug use: No    Sexual activity: Yes     Partners: Male     Birth control/protection: None         Family History - reviewed:  Family History   Problem Relation Age of Onset    Cancer Maternal Aunt     Diabetes Maternal Uncle     Cancer Maternal Grandfather     Heart Disease Mother          Immunizations - reviewed:   Immunization History   Administered Date(s) Administered    Influenza Vaccine 01/31/2014    Influenza Vaccine (Quad) PF 10/27/2016, 10/23/2017    Influenza Vaccine Intradermal PF 03/09/2016    Td, Adsorbed PF 01/24/2014    Tdap 06/17/2016, 11/06/2017     Review of Systems (positive in bold)  Gen: Denies fever, chills  Heme: Denies easy bleeding, bruising  Endocrine: Denies significant weight loss, gain  Cardio: Denies chest pain, palpitations  Lungs: Denies shortness of breath, cough  GI: Denies abdominal pain, melena, n/v, d/c  : Denies dysuria, hematuria  MSK: Denies cramping, weakness  Neuro: Denies vision changes    Physical Exam  Visit Vitals  /74 (BP 1 Location: Right arm, BP Patient Position: Sitting)   Pulse 78   Temp 98.9 °F (37.2 °C) (Oral)   Resp 18   Ht 5' (1.524 m)   Wt 119 lb (54 kg)   LMP 04/10/2019 (Exact Date)   SpO2 100%   Breastfeeding? No   BMI 23.24 kg/m²       Constitutional: Appears well,  No acute distress, Vitals noted  Psychiatric:  Affect normal, Alert and Oriented to person/place/time.  PHQ9 score 9  Eyes:  Pupils equally round and reactive, EOMI, conjunctiva clear  ENT: External ears and nose normal, poor denotation has lost most teeth and gums appear healthy, Mucous membranes, TMs and ear canal without erythema or edema, Orophyarynx clear  Neck: Thyroid nodule noted on the right. No abnormal cervical or supraclavicular nodes. Breasts: breasts appear normal, no suspicious masses, no skin or nipple changes or axillary nodes. chaperoned by nurse  Chest wall: asymmetric with more prominent on left side (2/2 levoscoliosis?), mild TTP of chest wall on the left (does not reproduce CP)  Lungs: Clear to auscultation, good respiratory effort, no wheezes, rales or rhonchi  Heart:  Normal HR, Normal S1 and S2,  Regular rhythm. No cardiac murmurs. No carotid bruits. Skin:  Warm to palpation, without rashes, bruising, or suspicious lesions     Personally reviewed:  EKG: NSR. Rate 70. No T wave changes. No ST derpession or elevation. Assessment/Plan    ICD-10-CM ICD-9-CM    1. Chest pain, unspecified type R07.9 786.50 AMB POC EKG ROUTINE W/ 12 LEADS, INTER & REP      EXERCISE CARDIAC STRESS TEST      METABOLIC PANEL, COMPREHENSIVE      CANCELED: METABOLIC PANEL, BASIC   2. Depression screen Z13.31 V79.0 [place] PATIENT SCREENED FOR DEPRESSION   3. Hypothyroidism, unspecified type E03.9 244.9 TSH RFX ON ABNORMAL TO FREE T4   4. Tobacco abuse Z72.0 305.1    5. Anemia, unspecified type D64.9 285.9 CBC W/O DIFF      IRON PROFILE      FERRITIN   6. Screening, lipid Z13.220 V77.91 HEMOGLOBIN A1C WITH EAG   7. Thyroid nodule E04.1 241.0 US THYROID/PARATHYROID/SOFT TISS   8. Poor dentition K08.9 525.9      CP: reassuring EKG. Etiology likely MSK but with family hx of heart disease will order stress test. Offered to schedule stress test however pt would like to make her own appt. Precautions given. Hypothyroidism: per chart review seems like she has hx of noncompliance. Will check labs today and also order thyroid US. Pt agreed to take medication if indicated. SE profile of medication discussed.      Depression: PHQ9 score 9 today. Pt feels overwhelmed with 8 kids but does not want to start medication and does not have time for therapy. No SI/HI. Pt made aware of family stress clinic. The patient was counseled on the dangers of tobacco use, and was advised to quit. Reviewed strategies to maximize success, including removing cigarettes and smoking materials from environment and stress management. Pt not ready to quit. Labs ordered as noted above. Discussed pneumonia vaccine but pt does not want this. Precautions given    Discussed with attending    I have discussed the diagnosis with the patient and the intended plan as seen in the above orders. Patient verbalized understanding of the plan and agrees with the plan. The patient has received an after-visit summary and questions were answered concerning future plans. I have discussed medication side effects and warnings with the patient as well. Informed patient to return to the office if new symptoms arise. Follow-up and Dispositions    · Return in about 1 week (around 5/6/2019).          Jaycee Palmer DO  Family Medicine Resident

## 2019-04-29 NOTE — PATIENT INSTRUCTIONS
Learning About Benefits From Quitting Smoking  How does quitting smoking make you healthier? If you're thinking about quitting smoking, you may have a few reasons to be smoke-free. Your health may be one of them. · When you quit smoking, you lower your risks for cancer, lung disease, heart attack, stroke, blood vessel disease, and blindness from macular degeneration. · When you're smoke-free, you get sick less often, and you heal faster. You are less likely to get colds, flu, bronchitis, and pneumonia. · As a nonsmoker, you may find that your mood is better and you are less stressed. When and how will you feel healthier? Quitting has real health benefits that start from day 1 of being smoke-free. And the longer you stay smoke-free, the healthier you get and the better you feel. The first hours  · After just 20 minutes, your blood pressure and heart rate go down. That means there's less stress on your heart and blood vessels. · Within 12 hours, the level of carbon monoxide in your blood drops back to normal. That makes room for more oxygen. With more oxygen in your body, you may notice that you have more energy than when you smoked. After 2 weeks  · Your lungs start to work better. · Your risk of heart attack starts to drop. After 1 month  · When your lungs are clear, you cough less and breathe deeper, so it's easier to be active. · Your sense of taste and smell return. That means you can enjoy food more than you have since you started smoking. Over the years  · After 1 year, your risk of heart disease is half what it would be if you kept smoking. · After 5 years, your risk of stroke starts to shrink. Within a few years after that, it's about the same as if you'd never smoked. · After 10 years, your risk of dying from lung cancer is cut by about half. And your risk for many other types of cancer is lower too. How would quitting help others in your life?   When you quit smoking, you improve the health of everyone who now breathes in your smoke. · Their heart, lung, and cancer risks drop, much like yours. · They are sick less. For babies and small children, living smoke-free means they're less likely to have ear infections, pneumonia, and bronchitis. · If you're a woman who is or will be pregnant someday, quitting smoking means a healthier . · Children who are close to you are less likely to become adult smokers. Where can you learn more? Go to http://eloy-kalia.info/. Enter 052 [id number] 72 11 in the search box to learn more about \"Learning About Benefits From Quitting Smoking. \"  Current as of: 2018  Content Version: 11.9  © 9384-1481 [employer]. Care instructions adapted under license by [employer] (which disclaims liability or warranty for this information). If you have questions about a medical condition or this instruction, always ask your healthcare professional. [place] [address] any warranty or liability for your use of this information. Chest Pain: Care Instructions  Your Care Instructions    There are many things that can cause chest pain. Some are not serious and will get better on their own in a few days. But some kinds of chest pain need more testing and treatment. Your doctor may have recommended a follow-up visit in the next 8 to 12 hours. If you are not getting better, you may need more tests or treatment. Even though your doctor has released you, you still need to watch for any problems. The doctor carefully checked you, but sometimes problems can develop later. If you have new symptoms or if your symptoms do not get better, get medical care right away. If you have worse or different chest pain or pressure that lasts more than 5 minutes or you passed out (lost consciousness), call 911 or seek other emergency help right away. A medical visit is only one step in your treatment.  Even if you feel better, you still need to do what your doctor recommends, such as going to all suggested follow-up appointments and taking medicines exactly as directed. This will help you recover and help prevent future problems. How can you care for yourself at home? · Rest until you feel better. · Take your medicine exactly as prescribed. Call your doctor if you think you are having a problem with your medicine. · Do not drive after taking a prescription pain medicine. When should you call for help? Call 911 if:    · You passed out (lost consciousness).     · You have severe difficulty breathing.     · You have symptoms of a heart attack. These may include:  ? Chest pain or pressure, or a strange feeling in your chest.  ? Sweating. ? Shortness of breath. ? Nausea or vomiting. ? Pain, pressure, or a strange feeling in your back, neck, jaw, or upper belly or in one or both shoulders or arms. ? Lightheadedness or sudden weakness. ? A fast or irregular heartbeat. After you call 911, the  may tell you to chew 1 adult-strength or 2 to 4 low-dose aspirin. Wait for an ambulance. Do not try to drive yourself.    Call your doctor today if:    · You have any trouble breathing.     · Your chest pain gets worse.     · You are dizzy or lightheaded, or you feel like you may faint.     · You are not getting better as expected.     · You are having new or different chest pain. Where can you learn more? Go to http://eloy-kalia.info/. Enter A120 in the search box to learn more about \"Chest Pain: Care Instructions. \"  Current as of: September 23, 2018  Content Version: 11.9  © 1101-7893 [employer]. Care instructions adapted under license by [employer] (which disclaims liability or warranty for this information).  If you have questions about a medical condition or this instruction, always ask your healthcare professional. [address] any warranty or liability for your use of this information. Depression and Chronic Disease: Care Instructions  Your Care Instructions    A chronic disease is one that you have for a long time. Some chronic diseases can be controlled, but they usually cannot be cured. Depression is common in people with chronic diseases, but it often goes unnoticed. Many people have concerns about seeking treatment for a mental health problem. You may think it's a sign of weakness, or you don't want people to know about it. It's important to overcome these reasons for not seeking treatment. Treating depression or anxiety is good for your health. Follow-up care is a key part of your treatment and safety. Be sure to make and go to all appointments, and call your doctor if you are having problems. It's also a good idea to know your test results and keep a list of the medicines you take. How can you care for yourself at home? Watch for symptoms of depression  The symptoms of depression are often subtle at first. You may think they are caused by your disease rather than depression. Or you may think it is normal to be depressed when you have a chronic disease. If you are depressed you may:  · Feel sad or hopeless. · Feel guilty or worthless. · Not enjoy the things you used to enjoy. · Feel hopeless, as though life is not worth living. · Have trouble thinking or remembering. · Have low energy, and you may not eat or sleep well. · Pull away from others. · Think often about death or killing yourself. (Keep the numbers for these national suicide hotlines: 6-589-895-TALK [1-873.368.2516] and 8-324-JMYTDDZ [1-806.728.9063]. )  Get treatment  By treating your depression, you can feel more hopeful and have more energy. If you feel better, you may take better care of yourself, so your health may improve. · Talk to your doctor if you have any changes in mood during treatment for your disease. · Ask your doctor for help.  Counseling, antidepressant medicine, or a combination of the two can help most people with depression. Often a combination works best. Counseling can also help you cope with having a chronic disease. When should you call for help? Call 911 anytime you think you may need emergency care. For example, call if:    · You feel like hurting yourself or someone else.     · Someone you know has depression and is about to attempt or is attempting suicide.   [facility] your doctor now or seek immediate medical care if:    · You hear voices.     · Someone you know has depression and:  ? Starts to give away his or her possessions. ? Uses illegal drugs or drinks alcohol heavily. ? Talks or writes about death, including writing suicide notes or talking about guns, knives, or pills. ? Starts to spend a lot of time alone. ? Acts very aggressively or suddenly appears calm.    Watch closely for changes in your health, and be sure to contact your doctor if:    · You do not get better as expected. Where can you learn more? Go to http://eloy-kalia.info/. Enter Q611 in the search box to learn more about \"Depression and Chronic Disease: Care Instructions. \"  Current as of: September 11, 2018  Content Version: 11.9  © 5706-4151 [employer]. Care instructions adapted under license by [employer] (which disclaims liability or warranty for this information). If you have questions about a medical condition or this instruction, always ask your healthcare professional. [place] [address] any warranty or liability for your use of this information.

## 2019-04-29 NOTE — PROGRESS NOTES
Identified Patient with two Patient identifiers (Name and ). Two Patient Identifiers confirmed. Reviewed record in preparation for visit and have obtained necessary documentation. Chief Complaint   Patient presents with    Hypothyroidism     follow up - labs       Visit Vitals  /74 (BP 1 Location: Right arm, BP Patient Position: Sitting)   Pulse 78   Temp 98.9 °F (37.2 °C) (Oral)   Resp 18   Ht 5' (1.524 m)   Wt 119 lb (54 kg)   SpO2 100%   Breastfeeding? No   BMI 23.24 kg/m²       1. Have you been to the ER, urgent care clinic since your last visit? Hospitalized since your last visit? No    2. Have you seen or consulted any other health care providers outside of the [address] [place] [address] since your last visit? Include any pap smears or colon screening.  No

## 2019-04-29 NOTE — PROGRESS NOTES
I reviewed with the resident the medical history and the resident's findings on the physical examination. I discussed with the resident the patient's diagnosis and concur with the plan. Seen on 4/17 for L sided CP down arm and to L shoulder with deep breaths. Chronic smoker from a young age. No hx of cardiovascular issues but runs in the family. On 4/17 it was thought to be chest wall issues - MSK. CXR was normal.  Advised ibuprofen and f/up with sports medicine. Supposed to be on thyroid replacement but not taking the Rx. On resident exam:  /74 (BP 1 Location: Right arm, BP Patient Position: Sitting)   Pulse 78   Temp 98.9 °F (37.2 °C) (Oral)   Resp 18   Ht 5' (1.524 m)   Wt 119 lb (54 kg)   LMP 04/10/2019 (Exact Date)   SpO2 100%   Breastfeeding?  No   BMI 23.24 kg/m²    Nodule R thyroid   Breast exam benign   Cardiac and lung exam normal except mild TTP chest wall with deep palpation     EKG NSR     Plan:  Reassured by normal EKG, still most likely MSK   Will order cardiac stress   Check labs  Check thyroid ultrasound   Smoking cessation counseling

## 2019-04-30 ENCOUNTER — TELEPHONE (OUTPATIENT)
Dept: FAMILY MEDICINE CLINIC | Age: 39
End: 2019-04-30

## 2019-04-30 LAB
ALBUMIN SERPL-MCNC: 4.2 G/DL (ref 3.5–5.5)
ALBUMIN/GLOB SERPL: 1.1 {RATIO} (ref 1.2–2.2)
ALP SERPL-CCNC: 56 IU/L (ref 39–117)
ALT SERPL-CCNC: 12 IU/L (ref 0–32)
AST SERPL-CCNC: 37 IU/L (ref 0–40)
BILIRUB SERPL-MCNC: <0.2 MG/DL (ref 0–1.2)
BUN SERPL-MCNC: 16 MG/DL (ref 6–20)
BUN/CREAT SERPL: 12 (ref 9–23)
CALCIUM SERPL-MCNC: 9 MG/DL (ref 8.7–10.2)
CHLORIDE SERPL-SCNC: 104 MMOL/L (ref 96–106)
CO2 SERPL-SCNC: 25 MMOL/L (ref 20–29)
CREAT SERPL-MCNC: 1.34 MG/DL (ref 0.57–1)
ERYTHROCYTE [DISTWIDTH] IN BLOOD BY AUTOMATED COUNT: 21.2 % (ref 12.3–15.4)
EST. AVERAGE GLUCOSE BLD GHB EST-MCNC: 100 MG/DL
FERRITIN SERPL-MCNC: 18 NG/ML (ref 15–150)
GLOBULIN SER CALC-MCNC: 3.7 G/DL (ref 1.5–4.5)
GLUCOSE SERPL-MCNC: 67 MG/DL (ref 65–99)
HBA1C MFR BLD: 5.1 % (ref 4.8–5.6)
HCT VFR BLD AUTO: 31.3 % (ref 34–46.6)
HGB BLD-MCNC: 9.7 G/DL (ref 11.1–15.9)
INTERPRETATION: NORMAL
IRON SATN MFR SERPL: 9 % (ref 15–55)
IRON SERPL-MCNC: 35 UG/DL (ref 27–159)
MCH RBC QN AUTO: 27.5 PG (ref 26.6–33)
MCHC RBC AUTO-ENTMCNC: 31 G/DL (ref 31.5–35.7)
MCV RBC AUTO: 89 FL (ref 79–97)
PLATELET # BLD AUTO: 373 X10E3/UL (ref 150–379)
POTASSIUM SERPL-SCNC: 4.8 MMOL/L (ref 3.5–5.2)
PROT SERPL-MCNC: 7.9 G/DL (ref 6–8.5)
RBC # BLD AUTO: 3.53 X10E6/UL (ref 3.77–5.28)
SODIUM SERPL-SCNC: 141 MMOL/L (ref 134–144)
T4 FREE SERPL-MCNC: 0.14 NG/DL (ref 0.82–1.77)
TIBC SERPL-MCNC: 370 UG/DL (ref 250–450)
TSH SERPL DL<=0.005 MIU/L-ACNC: 166.4 UIU/ML (ref 0.45–4.5)
UIBC SERPL-MCNC: 335 UG/DL (ref 131–425)
WBC # BLD AUTO: 9.3 X10E3/UL (ref 3.4–10.8)

## 2019-04-30 RX ORDER — LEVOTHYROXINE SODIUM 100 UG/1
100 TABLET ORAL
Qty: 60 TAB | Refills: 0 | Status: SHIPPED | OUTPATIENT
Start: 2019-04-30 | End: 2019-06-26 | Stop reason: SDUPTHER

## 2019-04-30 NOTE — TELEPHONE ENCOUNTER
Pt called to discuss labs.  confirmed. TSH and FT4 discussed. Pt recalls taking thyroid medication while she was pregnant but once she ran out in 2017 she stopped medication all together. Never followed up as she did not think she needed to be on it and another reason she stated for not asking for refills as she does not want her 8 kids to take them by accident. She was followed by endocrinology in past but never had control of hypothyroid as she admits not taking as prescribed. She is willing to restart. She understands the consequences. For her explanation for not taking the medications on a regular basis is the problem with remembering. Discussed about setting the alarm and putting some sticky notes. Weight based dose 86 mcg (1.6 mcg per kg per day x 54 kg). Will prescribe levothyroxine 100 mcg  -Will recheck TSH, free T4 in four-six weeks. Pt understanding of the plan and agreed with it. Also states she has not been hydrating well as she does not like the taste of water. Given Cr elevation, pt agreed to work on her hydration follow up next week for recheck and at that visit will follow-up her compliance. Pt to make appt for Thyroid US.     Precautions given    Plan discussed with attending    Massiel Espinosa DO

## 2019-05-13 ENCOUNTER — HOSPITAL ENCOUNTER (OUTPATIENT)
Dept: ULTRASOUND IMAGING | Age: 39
Discharge: HOME | End: 2019-05-13
Attending: FAMILY MEDICINE
Payer: COMMERCIAL

## 2019-05-13 ENCOUNTER — HOSPITAL ENCOUNTER (OUTPATIENT)
Dept: NON INVASIVE DIAGNOSTICS | Age: 39
Discharge: HOME | End: 2019-05-13
Attending: FAMILY MEDICINE
Payer: COMMERCIAL

## 2019-05-13 VITALS
BODY MASS INDEX: 23.36 KG/M2 | HEIGHT: 60 IN | SYSTOLIC BLOOD PRESSURE: 102 MMHG | WEIGHT: 119 LBS | DIASTOLIC BLOOD PRESSURE: 68 MMHG

## 2019-05-13 DIAGNOSIS — R07.9 CHEST PAIN, UNSPECIFIED TYPE: ICD-10-CM

## 2019-05-13 DIAGNOSIS — E04.1 THYROID NODULE: ICD-10-CM

## 2019-05-13 LAB
STRESS ANGINA INDEX: 1
STRESS BASELINE HR: 69 BPM
STRESS ESTIMATED WORKLOAD: 10.1 METS
STRESS EXERCISE DUR MIN: 9
STRESS PEAK DIAS BP: 70 MMHG
STRESS PEAK SYS BP: 124 MMHG
STRESS PERCENT HR ACHIEVED: 91 %
STRESS POST PEAK HR: 166 BPM
STRESS RATE PRESSURE PRODUCT: NORMAL BPM*MMHG
STRESS ST DEPRESSION: 0 MM
STRESS ST ELEVATION: 0 MM
STRESS TARGET HR: 183 BPM

## 2019-05-13 PROCEDURE — 93017 CV STRESS TEST TRACING ONLY: CPT

## 2019-05-13 PROCEDURE — 76536 US EXAM OF HEAD AND NECK: CPT

## 2019-05-23 ENCOUNTER — TELEPHONE (OUTPATIENT)
Dept: FAMILY MEDICINE CLINIC | Age: 39
End: 2019-05-23

## 2019-05-23 DIAGNOSIS — E03.9 HYPOTHYROIDISM, UNSPECIFIED TYPE: Primary | ICD-10-CM

## 2019-05-23 DIAGNOSIS — D35.1 PARATHYROID ADENOMA: ICD-10-CM

## 2019-05-23 NOTE — TELEPHONE ENCOUNTER
Attempted to call pt several times. Wanted to discuss imaging with patients. Based on review of thyroid/parathyroid US (noted enlarged heterogenous thyroid gland and possible right parathyroid adenoma) and given recent TSH/FT4. Pt will benefit to from an endocrinology referral for work-up. Pt was discussed with attending and agreeable with plan. Will place referral and send letter to pt.     Caty Bryant, DO

## 2019-05-23 NOTE — PROGRESS NOTES
Negative stress test. Stress test results correlate with a low risk of inducible myocardial ischemia.

## 2019-05-28 ENCOUNTER — OFFICE VISIT (OUTPATIENT)
Dept: FAMILY MEDICINE CLINIC | Age: 39
End: 2019-05-28

## 2019-05-28 VITALS
HEIGHT: 60 IN | WEIGHT: 117 LBS | BODY MASS INDEX: 22.97 KG/M2 | SYSTOLIC BLOOD PRESSURE: 114 MMHG | RESPIRATION RATE: 16 BRPM | HEART RATE: 77 BPM | DIASTOLIC BLOOD PRESSURE: 78 MMHG | OXYGEN SATURATION: 100 % | TEMPERATURE: 98.3 F

## 2019-05-28 DIAGNOSIS — D64.9 ANEMIA, UNSPECIFIED TYPE: ICD-10-CM

## 2019-05-28 DIAGNOSIS — M62.838 TRAPEZIUS MUSCLE SPASM: Primary | ICD-10-CM

## 2019-05-28 DIAGNOSIS — M94.0 COSTOCHONDRITIS: ICD-10-CM

## 2019-05-28 DIAGNOSIS — E03.9 HYPOTHYROIDISM, UNSPECIFIED TYPE: ICD-10-CM

## 2019-05-28 NOTE — PROGRESS NOTES
History of Present Illness     Patient Identification  Jessica Rosario is a 41 y.o. female complains of pain in the left shoulder pain/soreness. Onset of symptoms: 2-2.5months. Feels like muscular pain that started on L chest now L shoulder blade and radiates down left arm. No trauma/injury. Since it started, pain is constant, pressure. Some weakness because she can't lift her children well. Aggravating factors: movement, coughing, and sneezing. She is right handed, but favors the left side. Patient does a lot of lifting. Carries her children (17 months and 3years old) on the left side. Occasionally tries ice bottle/ hot baths and ibuprofen as needed. Denies chest pain or palpitations. Exercise cardiac stress test on 5/13/19 was WNL. Denies fever, chills, weight loss, nausea and vomiting. Past Medical History:   Diagnosis Date    Acquired hypothyroidism     hypothyroidism - takes 75 mcg synthroid    Anemia     Anemia affecting pregnancy in third trimester     Depression     pt reports to RN \"when I was in my teens\"    Tobacco abuse      Family History   Problem Relation Age of Onset    Cancer Maternal Aunt     Diabetes Maternal Uncle     Cancer Maternal Grandfather     Heart Disease Mother     Heart Disease Father      Current Outpatient Medications   Medication Sig Dispense Refill    levothyroxine (SYNTHROID) 100 mcg tablet Take 1 Tab by mouth Daily (before breakfast). on an empty stomach 30 minutes to 1 hour before breakfast  Indications: hypothyroidism 60 Tab 0    ibuprofen (MOTRIN) 600 mg tablet Take 1 Tab by mouth every six (6) hours as needed for Pain. 30 Tab 1     No Known Allergies    Review of Systems  Pertinent items are noted in HPI.      Physical Exam     Visit Vitals  /78 (BP 1 Location: Left arm, BP Patient Position: Sitting)   Pulse 77   Temp 98.3 °F (36.8 °C) (Oral)   Resp 16   Ht 5' (1.524 m)   Wt 117 lb (53.1 kg)   LMP 05/10/2019   SpO2 100%   BMI 22.85 kg/m²       GEN: Well appearing. No apparent distress. Responds to all questions appropriately. Lungs: No labored respirations. Talking in complete sentences without difficulty. CTAB  CV: RRR, no m/r/g    Shoulder: left  Deformity: None    ROM:  Forward Flexion: Active: 180     ER (0): Active: 45     IR (0): Active: Behind the body to the level T8  Abduction: Active: 180       Palpation:  AC tenderness: None  SC tenderness: None  Clavicle tenderness: None  Biceps tenderness: None  Trapezius tenderness: Left side  Tenderness along the left sternal border. Strength (0-5/5):  Deltoid - Anterior: 5/5  Deltoid - Posterior: 5/5  Deltoid - Mid: 5/5  Supraspinatus: 5/5  External rotation: 5/5  Internal rotation: 5/5    Rotator Cuff Exam:  Neers sign: Negative  Zeng sign: Negative  Painful Arc: Negative  Lift-off sign / Belly Press: Negative    Neuro/Vascular:  Pulses intact, no edema, and neurologically intact    C-Spine:  Cervical motion: FROM without pain. Cervical tenderness: None    Skin: No obvious rash or skin breakdown. Assessment:  Costochondritis  Trap spasm    Plan:  1. Home Exercise Program as per handout. 2. Ice 15 minutes, three times a day PRN and after exercise. Can alternate with heat for 15 minutes. 3. Referral to physical therapy    Medications:    1. Naproxin (Aleve): 220mg 1-2 tablets twice a day PRN. 2. Acetaminophen (Tylenol):  500mg 1-2 tablets every 6 hours as needed for pain.     RTC: as needed

## 2019-05-28 NOTE — PROGRESS NOTES
Chief Complaint   Patient presents with    Follow-up     pt states knot on chest radiating in left shoulder area     1. Have you been to the ER, urgent care clinic since your last visit? Hospitalized since your last visit? No    2. Have you seen or consulted any other health care providers outside of the [address] [place] [address] since your last visit? Include any pap smears or colon screening.  No

## 2019-06-11 ENCOUNTER — TELEPHONE (OUTPATIENT)
Dept: FAMILY MEDICINE CLINIC | Age: 39
End: 2019-06-11

## 2019-06-11 DIAGNOSIS — D64.9 ANEMIA, UNSPECIFIED TYPE: ICD-10-CM

## 2019-06-11 DIAGNOSIS — E03.9 HYPOTHYROIDISM, UNSPECIFIED TYPE: Primary | ICD-10-CM

## 2019-06-17 ENCOUNTER — LAB ONLY (OUTPATIENT)
Dept: FAMILY MEDICINE CLINIC | Age: 39
End: 2019-06-17

## 2019-06-20 LAB
BASOPHILS # BLD AUTO: 0 X10E3/UL (ref 0–0.2)
BASOPHILS NFR BLD AUTO: 0 %
BUN SERPL-MCNC: 6 MG/DL (ref 6–20)
BUN/CREAT SERPL: 12 (ref 9–23)
CALCIUM SERPL-MCNC: 9 MG/DL (ref 8.7–10.2)
CHLORIDE SERPL-SCNC: 100 MMOL/L (ref 96–106)
CO2 SERPL-SCNC: 25 MMOL/L (ref 20–29)
CREAT SERPL-MCNC: 0.49 MG/DL (ref 0.57–1)
EOSINOPHIL # BLD AUTO: 0.3 X10E3/UL (ref 0–0.4)
EOSINOPHIL NFR BLD AUTO: 4 %
ERYTHROCYTE [DISTWIDTH] IN BLOOD BY AUTOMATED COUNT: 19 % (ref 12.3–15.4)
GLUCOSE SERPL-MCNC: 78 MG/DL (ref 65–99)
HCT VFR BLD AUTO: 31.7 % (ref 34–46.6)
HGB BLD-MCNC: 9.7 G/DL (ref 11.1–15.9)
IMM GRANULOCYTES # BLD AUTO: 0 X10E3/UL (ref 0–0.1)
IMM GRANULOCYTES NFR BLD AUTO: 0 %
LYMPHOCYTES # BLD AUTO: 1.9 X10E3/UL (ref 0.7–3.1)
LYMPHOCYTES NFR BLD AUTO: 20 %
MCH RBC QN AUTO: 27.6 PG (ref 26.6–33)
MCHC RBC AUTO-ENTMCNC: 30.6 G/DL (ref 31.5–35.7)
MCV RBC AUTO: 90 FL (ref 79–97)
MONOCYTES # BLD AUTO: 0.8 X10E3/UL (ref 0.1–0.9)
MONOCYTES NFR BLD AUTO: 9 %
MORPHOLOGY BLD-IMP: ABNORMAL
NEUTROPHILS # BLD AUTO: 6.3 X10E3/UL (ref 1.4–7)
NEUTROPHILS NFR BLD AUTO: 67 %
PATH REV BLD -IMP: ABNORMAL
PATHOLOGIST NAME: ABNORMAL
PLATELET # BLD AUTO: 336 X10E3/UL (ref 150–450)
POTASSIUM SERPL-SCNC: 4 MMOL/L (ref 3.5–5.2)
RBC # BLD AUTO: 3.51 X10E6/UL (ref 3.77–5.28)
RETICS/RBC NFR AUTO: 1.9 % (ref 0.6–2.6)
SODIUM SERPL-SCNC: 139 MMOL/L (ref 134–144)
TSH SERPL DL<=0.005 MIU/L-ACNC: 0.96 UIU/ML (ref 0.45–4.5)
WBC # BLD AUTO: 9.4 X10E3/UL (ref 3.4–10.8)

## 2019-06-28 ENCOUNTER — OFFICE VISIT (OUTPATIENT)
Dept: FAMILY MEDICINE CLINIC | Age: 39
End: 2019-06-28

## 2019-06-28 DIAGNOSIS — D35.1 PARATHYROID ADENOMA: ICD-10-CM

## 2019-06-28 DIAGNOSIS — D64.9 ANEMIA, UNSPECIFIED TYPE: ICD-10-CM

## 2019-06-28 DIAGNOSIS — E03.9 HYPOTHYROIDISM, UNSPECIFIED TYPE: Primary | ICD-10-CM

## 2019-06-30 VITALS
WEIGHT: 118.2 LBS | HEART RATE: 88 BPM | TEMPERATURE: 98.1 F | OXYGEN SATURATION: 99 % | SYSTOLIC BLOOD PRESSURE: 128 MMHG | HEIGHT: 60 IN | BODY MASS INDEX: 23.2 KG/M2 | RESPIRATION RATE: 18 BRPM | DIASTOLIC BLOOD PRESSURE: 86 MMHG

## 2019-06-30 RX ORDER — LANOLIN ALCOHOL/MO/W.PET/CERES
325 CREAM (GRAM) TOPICAL
COMMUNITY
End: 2021-03-22

## 2019-06-30 RX ORDER — ASCORBIC ACID 250 MG
250 TABLET ORAL AS NEEDED
COMMUNITY

## 2019-07-01 ENCOUNTER — TELEPHONE (OUTPATIENT)
Dept: FAMILY MEDICINE CLINIC | Age: 39
End: 2019-07-01

## 2019-07-01 NOTE — PROGRESS NOTES
Subjective  Janie Hi is an 41 y.o. female who presents for follow-up. for hypothyroid. Pt states she is feeling well. Taking synthroid and recently started to take iron supplements with vit C for anemia. Pt had labs completed on 19. TSH improved. Has appt for endocrinology to discuss possible parathyroid adenoma noted on ultrasound, appt on 2019. Husbands states pt had improved significantly. She is a . Pap smear 16 nml pap with negative HPV. LMP 5/10. Does not use family planning with . No abdominal pain or vaginal bleeding. Allergies - reviewed:   No Known Allergies      Medications - reviewed:   Current Outpatient Medications   Medication Sig    ferrous sulfate 325 mg (65 mg iron) tablet Take  by mouth Daily (before breakfast).  ascorbic acid, vitamin C, (VITAMIN C) 250 mg tablet Take  by mouth.  levothyroxine (SYNTHROID) 100 mcg tablet TAKE 1 TABLET BY MOUTH ONCE DAILY ON AN EMPTY STOMACH 30 MINUTES TO 1 HOUR BEFORE BREAKFAST    ibuprofen (MOTRIN) 600 mg tablet Take 1 Tab by mouth every six (6) hours as needed for Pain. No current facility-administered medications for this visit.           Past Medical History - reviewed:  Past Medical History:   Diagnosis Date    Acquired hypothyroidism     hypothyroidism - takes 75 mcg synthroid    Anemia     Anemia affecting pregnancy in third trimester     Depression     pt reports to RN \"when I was in my teens\"    Tobacco abuse        Social History - reviewed:  Social History     Socioeconomic History    Marital status:    Tobacco Use    Smoking status: Current Every Day Smoker     Packs/day: 0.30     Years: 20.00     Pack years: 6.00     Types: Cigarettes     Last attempt to quit: 2016     Years since quitting: 3.4    Smokeless tobacco: Never Used    Tobacco comment: Smokes 3-5 cigarettes a day depending on the day   Substance and Sexual Activity    Alcohol use: No     Alcohol/week: 0.0 oz  Drug use: No    Sexual activity: Yes     Partners: Male     Birth control/protection: None         Family History - reviewed:  Family History   Problem Relation Age of Onset    Cancer Maternal Aunt     Diabetes Maternal Uncle     Cancer Maternal Grandfather     Heart Disease Mother     Heart Disease Father          Immunizations - reviewed:   Immunization History   Administered Date(s) Administered    Influenza Vaccine 01/31/2014    Influenza Vaccine (Quad) PF 10/27/2016, 10/23/2017    Influenza Vaccine Intradermal PF 03/09/2016    Td, Adsorbed PF 01/24/2014    Tdap 06/17/2016, 11/06/2017     Review of Systems (positive in bold)  Gen: Denies fever, chills  Heme: Denies easy bleeding, bruising  Endocrine: Denies significant weight loss, gain  Cardio: Denies chest pain, palpitations  Lungs: Denies shortness of breath, cough  GI: Denies abdominal pain, melena, n/v, d/c  : Denies dysuria, hematuria  MSK: Denies cramping, weakness  Neuro: Denies vision changes    Physical Exam  Visit Vitals  /86 (BP 1 Location: Left arm, BP Patient Position: Sitting)   Pulse 88   Temp 98.1 °F (36.7 °C) (Oral)   Resp 18   Ht 5' (1.524 m)   Wt 118 lb 3.2 oz (53.6 kg)   SpO2 99%   BMI 23.08 kg/m²       Constitutional: Appears well,  No acute distress, Vitals noted  ENT: External ears and nose normal, poor denotation has lost most teeth and gums appear healthy, Mucous membranes, TMs and ear canal without erythema or edema, Orophyarynx clear  Neck: Thyroid nodule noted on the right. No abnormal cervical or supraclavicular nodes. Lungs: Clear to auscultation, good respiratory effort, no wheezes, rales or rhonchi  Heart:  Normal HR, Normal S1 and S2,  Regular rhythm.  No cardiac murmurs.  No carotid bruits.       Chart review:  IMAGING:   US thyroid 5/13/19  FINDINGS:  The thyroid is heterogenous in echotexture with no mass, nodule or other  abnormality.     The right lobe measures 6.4 x 2.5 cm and the left lobe measures 6.8 x 2.2 cm. The isthmus measures 10 mm. Possible parathyroid gland seen on the right 1.5 x  0.7 x 1.1 cm. Enlarged heterogenous thyroid.     IMPRESSION:   Enlarged heterogenous thyroid gland. No large thyroid nodule. Possible right parathyroid adenoma. Exercise cardiac stress test 5/13/19:  · Patient had 5/10 chest pain at start of study which persisted unchanged throughout the study  · No ischemic EKG changes with exercise. · Low risk Duke treadmill score (5). · Stress test results correlate with a low risk of inducible myocardial ischemia. Assessment/Plan    ICD-10-CM ICD-9-CM    1. Hypothyroidism, unspecified type E03.9 244.9 REFERRAL TO ENDOCRINOLOGY   2. Anemia, unspecified type D64.9 285.9 REFERRAL TO HEMATOLOGY   3. Parathyroid adenoma D35.1 227.1 REFERRAL TO ENDOCRINOLOGY     -Reviewed labs and imaging. -TSH has improved. Continue current dose of synthroid.   -Will refer to hematology for anemia. Pt agreed to make appt  -Pt asked to given a new referral to another endo. Referral modified. Has appt in 8/2019 to discuss US findings.   -All questions answered  -Precautions given  -Discussed with attending    Follow-up and Dispositions    · Return in about 2 months (around 8/28/2019). I have discussed the diagnosis with the patient and the intended plan as seen in the above orders. Patient verbalized understanding of the plan and agrees with the plan. The patient has received an after-visit summary and questions were answered concerning future plans. I have discussed medication side effects and warnings with the patient as well. Informed patient to return to the office if new symptoms arise.         Andrew Kimball DO  Family Medicine Resident

## 2019-08-19 ENCOUNTER — OFFICE VISIT (OUTPATIENT)
Dept: ENDOCRINOLOGY | Age: 39
End: 2019-08-19

## 2019-08-19 VITALS
HEIGHT: 60 IN | RESPIRATION RATE: 16 BRPM | OXYGEN SATURATION: 98 % | BODY MASS INDEX: 22.93 KG/M2 | WEIGHT: 116.8 LBS | HEART RATE: 72 BPM | SYSTOLIC BLOOD PRESSURE: 116 MMHG | DIASTOLIC BLOOD PRESSURE: 69 MMHG

## 2019-08-19 DIAGNOSIS — E03.9 HYPOTHYROIDISM, UNSPECIFIED TYPE: Primary | ICD-10-CM

## 2019-08-19 DIAGNOSIS — E55.9 VITAMIN D DEFICIENCY: ICD-10-CM

## 2019-08-19 DIAGNOSIS — D35.1 PARATHYROID ADENOMA: ICD-10-CM

## 2019-08-19 NOTE — PROGRESS NOTES
Donald Caceres MD        Patient Information  Date:8/19/2019  Name : Radha Klein 41 y.o.     YOB: 1980             Chief Complaint   Patient presents with    Thyroid Problem       History of present illness    Radha lKein is a 41 y.o. female  here for evaluation of thyroid. She was seen in 2013 for primary hypothyroidism, she was pregnant then. She has long-standing history of hypothyroidism, was not compliant with the levothyroxine, levels have been fluctuating widely, highest TSH was 166, recently started taking levothyroxine consistently. She was found to have goiter. She is now on 100 mcg of levothyroxine  Had ultrasound thyroid which showed diffuse goiter with possible right parathyroid adenoma. Calcium levels were normal.  No hypercalcemia, kidney stones, family history of hypercalcemia or kidney stones    No fractures    Past Medical History:   Diagnosis Date    Acquired hypothyroidism     hypothyroidism - takes 75 mcg synthroid    Anemia     Anemia affecting pregnancy in third trimester     Depression     pt reports to RN \"when I was in my teens\"    Tobacco abuse        Current Outpatient Medications   Medication Sig    ferrous sulfate 325 mg (65 mg iron) tablet Take  by mouth Daily (before breakfast).  levothyroxine (SYNTHROID) 100 mcg tablet TAKE 1 TABLET BY MOUTH ONCE DAILY ON AN EMPTY STOMACH 30 MINUTES TO 1 HOUR BEFORE BREAKFAST    ibuprofen (MOTRIN) 600 mg tablet Take 1 Tab by mouth every six (6) hours as needed for Pain.  ascorbic acid, vitamin C, (VITAMIN C) 250 mg tablet Take  by mouth. No current facility-administered medications for this visit.           Review of Systems:  - Constitutional Symptoms: no fevers, chills, weight loss  - Eyes: no blurry vision or double vision  - Cardiovascular: no chest pain or palpitations  - Respiratory: no cough or shortness of breath  - Gastrointestinal: no dysphagia or abdominal pain  - Musculoskeletal: no joint pains   - Integumentary: no rashes  - Neurological: no numbness, tingling, or headaches  - Psychiatric: no depression or anxiety  - Endocrine: no polyuria or polydipsia    Physical Examination:  - Blood pressure 116/69, pulse 72, resp. rate 16, height 5' (1.524 m), weight 116 lb 12.8 oz (53 kg), SpO2 98 %, not currently breastfeeding. Body mass index is 22.81 kg/m². - General: Pleasant, good eye contact, not in distress  - HEENT: no exopthalmos, no periorbital edema, no scleral/conjunctival injection, EOMI  - Neck: supple, no nodules  - Cardiovascular: regular,  normal S1 and S2, no murmurs  - Respiratory: clear to auscultation bilaterally  - Gastrointestinal: soft, nontender, nondistended, BS +  - Musculoskeletal: no proximal muscle weakness in upper or lower extremities  - Integumentary: no tremors, no edema  - Neurological: alert and oriented   - Psychiatric: normal mood and affect  - Skin - dry skin    Data Reviewed:       [x] Reviewed labs    Assessment/Plan:     Primary hypothyroidism: Commended for compliance with the levothyroxine  Instructions discussed  Biochemically euthyroid    Possible right parathyroid adenoma: Reported on the ultrasound  Her calcium has been normal  Recheck CMP, PTH, vitamin D  If above labs are normal, no need for any intervention            Thank you for allowing me to participate in the care of this patient.     Riky Osullivan MD

## 2019-08-19 NOTE — PROGRESS NOTES
Lab Results   Component Value Date/Time    TSH 0.965 06/17/2019 03:05 PM    TSH 6.020 (H) 06/01/2017 10:47 AM    TSH, External 0.69 uU/mL 05/06/2016 11:32 AM    T4, Free 1.29 06/01/2017 10:47 AM    T4, Total 2.2 (L) 10/07/2016 09:41 AM

## 2019-08-19 NOTE — LETTER
8/20/19 Patient: Chery Hogan YOB: 1980 Date of Visit: 8/19/2019 Dalila Wongaskaret [address] [place] [phone] VIA In Basket Ayr Allen, [address] [phone] VIA In Basket Dear MD Ary Wong, DO, Thank you for referring Ms. Chery Hogan to [address] [place] [address] for evaluation. My notes for this consultation are attached. If you have questions, please do not hesitate to call me. I look forward to following your patient along with you. Sincerely, Leonardo Newton MD

## 2019-08-20 LAB
25(OH)D3+25(OH)D2 SERPL-MCNC: 33.4 NG/ML (ref 30–100)
ALBUMIN SERPL-MCNC: 4.1 G/DL (ref 3.5–5.5)
ALBUMIN/GLOB SERPL: 1.6 {RATIO} (ref 1.2–2.2)
ALP SERPL-CCNC: 60 IU/L (ref 39–117)
ALT SERPL-CCNC: 5 IU/L (ref 0–32)
AST SERPL-CCNC: 11 IU/L (ref 0–40)
BILIRUB SERPL-MCNC: 0.3 MG/DL (ref 0–1.2)
BUN SERPL-MCNC: 11 MG/DL (ref 6–20)
BUN/CREAT SERPL: 17 (ref 9–23)
CALCIUM SERPL-MCNC: 8.9 MG/DL (ref 8.7–10.2)
CHLORIDE SERPL-SCNC: 103 MMOL/L (ref 96–106)
CO2 SERPL-SCNC: 23 MMOL/L (ref 20–29)
CREAT SERPL-MCNC: 0.66 MG/DL (ref 0.57–1)
GLOBULIN SER CALC-MCNC: 2.6 G/DL (ref 1.5–4.5)
GLUCOSE SERPL-MCNC: 77 MG/DL (ref 65–99)
POTASSIUM SERPL-SCNC: 4.7 MMOL/L (ref 3.5–5.2)
PROT SERPL-MCNC: 6.7 G/DL (ref 6–8.5)
PTH-INTACT SERPL-MCNC: 25 PG/ML (ref 15–65)
SODIUM SERPL-SCNC: 140 MMOL/L (ref 134–144)
T4 FREE SERPL-MCNC: 1.24 NG/DL (ref 0.82–1.77)
TSH SERPL DL<=0.005 MIU/L-ACNC: 3.75 UIU/ML (ref 0.45–4.5)

## 2019-10-31 ENCOUNTER — CLINICAL SUPPORT (OUTPATIENT)
Dept: FAMILY MEDICINE CLINIC | Age: 39
End: 2019-10-31

## 2019-10-31 DIAGNOSIS — Z23 ENCOUNTER FOR IMMUNIZATION: Primary | ICD-10-CM

## 2019-10-31 NOTE — PROGRESS NOTES
Per Verbal Order From MD, Need for Immunization.    Chief Complaint   Patient presents with    Immunization/Injection

## 2020-03-30 ENCOUNTER — OFFICE VISIT (OUTPATIENT)
Dept: OBGYN CLINIC | Age: 40
End: 2020-03-30

## 2020-03-30 VITALS
SYSTOLIC BLOOD PRESSURE: 120 MMHG | WEIGHT: 119 LBS | HEIGHT: 60 IN | BODY MASS INDEX: 23.36 KG/M2 | DIASTOLIC BLOOD PRESSURE: 75 MMHG | HEART RATE: 94 BPM

## 2020-03-30 DIAGNOSIS — O09.529 ENCOUNTER FOR SUPERVISION OF HIGH RISK MULTIGRAVIDA OF ADVANCED MATERNAL AGE, ANTEPARTUM: ICD-10-CM

## 2020-03-30 DIAGNOSIS — O09.529 ENCOUNTER FOR SUPERVISION OF HIGH-RISK PREGNANCY WITH MULTIGRAVIDA OF ADVANCED MATERNAL AGE: Primary | ICD-10-CM

## 2020-03-30 PROBLEM — Z34.90 PREGNANCY: Status: ACTIVE | Noted: 2020-03-30

## 2020-03-30 PROBLEM — Z34.90 PREGNANCY: Status: RESOLVED | Noted: 2017-06-01 | Resolved: 2020-03-30

## 2020-03-30 LAB
ANTIBODY SCREEN, EXTERNAL: NEGATIVE
HBSAG, EXTERNAL: NEGATIVE
HIV, EXTERNAL: NORMAL
RUBELLA, EXTERNAL: NORMAL
T. PALLIDUM, EXTERNAL: NORMAL

## 2020-03-30 NOTE — PROGRESS NOTES
Current pregnancy history:    Cleda Bosworth is a 46 y.o. female who presents for the evaluation of pregnancy. Patient's last menstrual period was 2020 (exact date). LMP history:  The date of her LMP is certain. Her last menstrual period was normal and lasted for 4 to 5 days. A urine pregnancy test was positive 6 weeks ago. She was not on the pill at conception. Based on her LMP, her EDC is 10/14/20 and her EGA is 11 weeks, 5 days. Her menstrual cycles are regular and occur approximately every 28 days and range from 3 to 5 days. The last menses lasted the usual number of days. Ultrasound data:  She had an ultrasound done by the ultrasound tech today which revealed a viable vail pregnancy with a gestational age of 16 weeks and 0 days giving an [address] of 10/12/20. Pregnancy symptoms:    Since her LMP she has experienced  urinary frequency, breast tenderness, and nausea. She has been vomiting over the last few weeks. Associated signs and symptoms which she denies: dysuria, discharge, vaginal bleeding. She states she has gained weight:  Approximately 5 pounds over the last few weeks. Relevant past pregnancy history:   She has the followiing pregnancy history: Her last pregnancy was uncomplicated. She has no history of  delivery. Relevant past medical history:(relevant to this pregnancy): noncontributory. Pap/Occupational history:  Last pap smear: last year Results: Normal      Her occupation is: [employer]. Substance history: negative for alcohol, tobacco and street drugs. She quit smoking in 2020           Positive for nothing. Exposure history: There is/are no indoor cat/s in the home. The patient was instructed to not change the cat litter. She admits close contact with children on a regular basis. She has had chicken pox or the vaccine in the past.   Patient denies issues with domestic violence.      Genetic Screening/Teratology Counseling: (Includes patient, baby's father, or anyone in either family with:)  3.  Patient's age >/= 28 at [facility]?-- no  .   2. Thalassemia ([facility] [place], [address], or  background): MCV<80?--no.     3.  Neural tube defect (meningomyelocele, spina bifida, anencephaly)?--no.   4.  Congenital heart defect?--no.  5.  Down syndrome?--no.   6.  Pierre-Sachs ([place] [place])?--no.   7.  Canavan's Disease?--no.   8.  Familial Dysautonomia?--no.   9.  Sickle cell disease or trait ()? --no   The patient has not been tested for sickle trait  10. Hemophilia or other blood disorders?--no. 11.  Muscular dystrophy?--no. 12.  Cystic fibrosis?--no. 13.  Merrimack's Chorea?--no. 14.  Mental retardation/autism (if yes was person tested for Fragile X)?--no. 15.  Other inherited genetic or chromosomal disorder?--no. 12.  Maternal metabolic disorder (DM, PKU, etc)?--no. 17.  Patient or FOB with a child with a birth defect not listed above?--no.  17a. Patient or FOB with a birth defect themselves?--no. 18.  Recurrent pregnancy loss, or stillbirth?--no. 19.  Any medications since LMP other than prenatal vitamins (include vitamins, supplements, OTC meds, drugs, alcohol)? --Synthroid and iron/vit C.  20.  Any other genetic/environmental exposure to discuss?--no. Infection History:  1. Lives with someone with TB or TB exposed?--no.   2.  Patient or partner has history of genital herpes?--no.  3.  Rash or viral illness since LMP?--no.    4.  History of STD (GC, CT, HPV, syphilis, HIV)? --no   5. Other: OTHER? Past Medical History:   Diagnosis Date    Acquired hypothyroidism     hypothyroidism - takes 75 mcg synthroid    Anemia     Anemia affecting pregnancy in third trimester     Depression     pt reports to RN \"when I was in my teens\"    Tobacco abuse      History reviewed. No pertinent surgical history.   Social History     Occupational History     Employer: NOT EMPLOYED   Tobacco Use    Smoking status: Former Smoker     Packs/day: 0.30     Years: 20.00     Pack years: 6.00     Types: Cigarettes     Last attempt to quit: 2020     Years since quittin.1    Smokeless tobacco: Never Used    Tobacco comment: Quit when she found out she was pregnant   Substance and Sexual Activity    Alcohol use: No     Alcohol/week: 0.0 standard drinks    Drug use: No    Sexual activity: Yes     Partners: Male     Birth control/protection: None     Family History   Problem Relation Age of Onset    Cancer Maternal Aunt     Diabetes Maternal Uncle     Cancer Maternal Grandfather     Heart Disease Mother     Heart Disease Father        No Known Allergies  Prior to Admission medications    Medication Sig Start Date End Date Taking? Authorizing Provider   levothyroxine (SYNTHROID) 100 mcg tablet TAKE 1 TABLET BY MOUTH ONCE DAILY ON AN EMPTY STOMACH 30 MINUTES TO 1 HOUR BEFORE BREAKFAST 19  Yes Izaiah Griggs,    ferrous sulfate 325 mg (65 mg iron) tablet Take  by mouth Daily (before breakfast). Yes Provider, Historical   ascorbic acid, vitamin C, (VITAMIN C) 250 mg tablet Take  by mouth. Yes Provider, Historical   ibuprofen (MOTRIN) 600 mg tablet Take 1 Tab by mouth every six (6) hours as needed for Pain.  1/15/18 3/30/20  Celeste Marks MD        Review of Systems: History obtained from the patient  Constitutional: negative for weight loss, fever, night sweats  HEENT: negative for hearing loss, earache, congestion, snoring, sorethroat  CV: negative for chest pain, palpitations, edema  Resp: negative for cough, shortness of breath, wheezing  Breast: negative for breast lumps, nipple discharge, galactorrhea  GI: negative for change in bowel habits, abdominal pain, black or bloody stools  : negative for frequency, dysuria, hematuria, vaginal discharge  MSK: negative for back pain, joint pain, muscle pain  Skin: negative for itching, rash, hives  Neuro: negative for dizziness, headache, confusion, weakness  Psych: negative for anxiety, depression, change in mood  Heme/lymph: negative for bleeding, bruising, pallor    Objective:  Visit Vitals  /75   Pulse 94   Ht 5' (1.524 m)   Wt 119 lb (54 kg)   LMP 01/08/2020 (Exact Date)   Breastfeeding No   BMI 23.24 kg/m²       Physical Exam:   PHYSICAL EXAMINATION    Constitutional  · Appearance: well-nourished, well developed, alert, in no acute distress    HENT  · Head  · Face: appears normal  · Eyes: appear normal  · Ears: normal  · Mouth: normal  · Lips: no lesions    Neck  · Inspection/Palpation: normal appearance, no masses or tenderness  · Lymph Nodes: no lymphadenopathy present  · Thyroid: gland size normal, nontender, no nodules or masses present on palpation    Chest  · Respiratory Effort: breathing unlabored  · Auscultation: normal breath sounds    Cardiovascular  · Heart:  · Auscultation: regular rate and rhythm without murmur    Breasts  · Inspection of Breasts: breasts symmetrical, no skin changes, no discharge present, nipple appearance normal, no skin retraction present  · Palpation of Breasts and Axillae: no masses present on palpation, no breast tenderness  · Axillary Lymph Nodes: no lymphadenopathy present    Gastrointestinal  · Abdominal Examination: abdomen non-tender to palpation, normal bowel sounds, no masses present  · Liver and spleen: no hepatomegaly present, spleen not palpable  · Hernias: no hernias identified    Genitourinary  · External Genitalia: normal appearance for age, no discharge present, no tenderness present, no inflammatory lesions present, no masses present, no atrophy present  · Vagina: normal vaginal vault without central or paravaginal defects, no discharge present, no inflammatory lesions present, no masses present  · Bladder: non-tender to palpation  · Urethra: appears normal  · Cervix: normal   · Uterus: enlarged, normal shape, soft  · Adnexa: no adnexal tenderness present, no adnexal masses present  · Perineum: perineum within normal limits, no evidence of trauma, no rashes or skin lesions present  · Anus: anus within normal limits, no hemorrhoids present  · Inguinal Lymph Nodes: no lymphadenopathy present    Skin  · General Inspection: no rash, no lesions identified    Neurologic/Psychiatric  · Mental Status:  · Orientation: grossly oriented to person, place and time  · Mood and Affect: mood normal, affect appropriate    Assessment:   Intrauterine pregnancy with the following problems identified: AMA, grand multip. Plan:     Offered CF testing, CVS, Nuchal Translucency, MSAFP, amnio, and discussed NIPT  Course of pregnancy discussed including visit schedule, routine U/S, glucola testing, etc.  Avoid alcoholic beverages and illicit/recreational drugs use  Take prenatal vitamins or folic acid daily. Hospital and practice style discussed with coverage system. Discussed nutrition, toxoplasmosis precautions, sexual activity, exercise, need for influenza vaccine, environmental and work hazards, travel advice, screen for domestic violence, need for seat belts. Discussed seafood, unpasteurized dairy products, deli meat, artificial sweeteners, and caffeine. Information on prenatal classes/breastfeeding given. Information on circumcision given  Patient encouraged not to smoke. Discussed current prescription drug use. Given medication list.  Discussed the use of over the counter medications and chemicals. Pt understands risk of hemorrhage during pregnancy and post delivery and would accept blood products if necessary in life-threatening emergencies    Panorama sent  She wants to see MFM but due to her schedule a NT could not be done. She will have 20 week FS at [facility] given to pt.

## 2020-04-01 LAB
ABO GROUP BLD: NORMAL
BACTERIA UR CULT: NORMAL
BLD GP AB SCN SERPL QL: NEGATIVE
ERYTHROCYTE [DISTWIDTH] IN BLOOD BY AUTOMATED COUNT: 14.8 % (ref 11.7–15.4)
HBV SURFACE AG SERPL QL IA: NEGATIVE
HCT VFR BLD AUTO: 38.9 % (ref 34–46.6)
HGB BLD-MCNC: 13.4 G/DL (ref 11.1–15.9)
HIV 1+2 AB+HIV1 P24 AG SERPL QL IA: NON REACTIVE
MCH RBC QN AUTO: 30.4 PG (ref 26.6–33)
MCHC RBC AUTO-ENTMCNC: 34.4 G/DL (ref 31.5–35.7)
MCV RBC AUTO: 88 FL (ref 79–97)
PLATELET # BLD AUTO: 303 X10E3/UL (ref 150–450)
RBC # BLD AUTO: 4.41 X10E6/UL (ref 3.77–5.28)
RH BLD: POSITIVE
RUBV IGG SERPL IA-ACNC: 6.06 INDEX
T4 FREE SERPL-MCNC: 0.26 NG/DL (ref 0.82–1.77)
TREPONEMA PALLIDUM IGG+IGM AB [PRESENCE] IN SERUM OR PLASMA BY IMMUNOASSAY: NON REACTIVE
TSH SERPL DL<=0.005 MIU/L-ACNC: 99.87 UIU/ML (ref 0.45–4.5)
WBC # BLD AUTO: 7 X10E3/UL (ref 3.4–10.8)

## 2020-04-01 NOTE — PROGRESS NOTES
Pt informed of results. She states she has an appt with endo on April 13th. She has not been taking her thyroid medication as prescribed.   TSH result routed to her endo with message per Antonio Bach

## 2020-04-02 ENCOUNTER — TELEPHONE (OUTPATIENT)
Dept: ENDOCRINOLOGY | Age: 40
End: 2020-04-02

## 2020-04-02 LAB
C TRACH RRNA CVX QL NAA+PROBE: NEGATIVE
CYTOLOGIST CVX/VAG CYTO: NORMAL
CYTOLOGY CVX/VAG DOC CYTO: NORMAL
CYTOLOGY CVX/VAG DOC THIN PREP: NORMAL
CYTOLOGY HISTORY:: NORMAL
DX ICD CODE: NORMAL
HPV I/H RISK 4 DNA CVX QL PROBE+SIG AMP: NEGATIVE
Lab: NORMAL
N GONORRHOEA RRNA CVX QL NAA+PROBE: NEGATIVE
OTHER STN SPEC: NORMAL
STAT OF ADQ CVX/VAG CYTO-IMP: NORMAL

## 2020-04-02 NOTE — TELEPHONE ENCOUNTER
Informed pt that lab appt will be canceled. Lab slips will be mailed to pt to have done at a [facility]. Also informed pt that upcoming appt will be converted to a virtual visit. The  will contact pt with further info. Pt states she just had thyroid labs drawn at [facility], results in [facility]. Pt states she has not been taking her thyroid medication because when she takes it she begins to have chest pain. She says she stops the medication for a day or two then the pain goes away. She states she just recently restarted taking it. She is asking does the dose need to be lowered.

## 2020-04-03 ENCOUNTER — VIRTUAL VISIT (OUTPATIENT)
Dept: ENDOCRINOLOGY | Age: 40
End: 2020-04-03

## 2020-04-03 DIAGNOSIS — E03.9 HYPOTHYROIDISM, UNSPECIFIED TYPE: Primary | ICD-10-CM

## 2020-04-03 DIAGNOSIS — D35.1 PARATHYROID ADENOMA: ICD-10-CM

## 2020-04-03 DIAGNOSIS — Z3A.12 12 WEEKS GESTATION OF PREGNANCY: ICD-10-CM

## 2020-04-03 RX ORDER — LEVOTHYROXINE SODIUM 100 UG/1
100 TABLET ORAL
Qty: 30 TAB | Refills: 4 | Status: SHIPPED | OUTPATIENT
Start: 2020-04-03 | End: 2020-05-22 | Stop reason: DRUGHIGH

## 2020-04-03 NOTE — PROGRESS NOTES
Adry Worrell is a 46 y.o. female here for   Chief Complaint   Patient presents with    Thyroid Problem     Pt consented to virtual visit. 1. Have you been to the ER, urgent care clinic since your last visit? Hospitalized since your last visit? -no    2. Have you seen or consulted any other health care providers outside of the [address] [place] [address] since your last visit?   Include any pap smears or colon screening.-no    Order placed for pt per verbal order with read back from Dr. Jessica Andrade 04/03/20

## 2020-04-04 NOTE — PROGRESS NOTES
Jovi Dueñas MD        Patient Information  Date:4/4/2020  Name : Carley Wells 46 y.o.     YOB: 1980             Chief Complaint   Patient presents with    Thyroid Problem       History of present illness    Pursuant to the emergency declaration under the [facility] [place] [address] [facility] authority and the [employer] Act, this Virtual  Visit was conducted, with patient's consent, to reduce the patient's risk of exposure to COVID-19 and provide continuity of care for an established patient. Services were provided through a video synchronous discussion virtually to substitute for in-person clinic visit. Carley Wells is a 46 y.o. female   She reestablished care in August 2019  She was seen in 2013 for primary hypothyroidism, she was pregnant then. She has long-standing history of hypothyroidism, was not compliant with the levothyroxine, levels have been fluctuating widely, highest TSH was 166,. Had ultrasound thyroid in May 2019 which showed diffuse goiter with possible right parathyroid adenoma. Calcium levels were normal.  No hypercalcemia, kidney stones, family history of hypercalcemia or kidney stones    Recent TSH is 99, she has stopped taking levothyroxine due to side effects. Reports chest pain whenever she takes levothyroxine, not really tremors, nervousness, jitteriness. Feels better when she stops. Thinks that the dose is higher.   Last year when she took it consistently 100 mcg TSH was normal  Did not have menstrual cycles for 3 months, found to be pregnant, 12 weeks now    No weight gain, fatigue, constipation, dry skin  No chest pain or shortness of breath    Past Medical History:   Diagnosis Date    Acquired hypothyroidism     hypothyroidism - takes 75 mcg synthroid    Anemia     Anemia affecting pregnancy in third trimester     Depression pt reports to RN \"when I was in my teens\"    Tobacco abuse        Current Outpatient Medications   Medication Sig    Unithroid 100 mcg tablet Take 1 Tab by mouth Daily (before breakfast). Brand Medically Necessary. Stop Levothyroxine    ascorbic acid, vitamin C, (VITAMIN C) 250 mg tablet Take 250 mg by mouth as needed.  ferrous sulfate 325 mg (65 mg iron) tablet Take 325 mg by mouth Daily (before breakfast). No current facility-administered medications for this visit. Review of Systems:  - Constitutional Symptoms: no fevers, chills,  - Eyes: no blurry vision or double vision  - Cardiovascular: + Chest pain or palpitations  - Respiratory: no cough or shortness of breath  - Gastrointestinal: no dysphagia or abdominal pain  - Musculoskeletal: no joint pains   -     Physical Examination:  - Last menstrual period 01/08/2020, not currently breastfeeding. There is no height or weight on file to calculate BMI. - General: pleasant, no distress, good eye contact  - HEENT: no redness, no periorbital edema, EOMI  - Neck: No visible thyromegaly  - RS: Normal respiratory effort  - Musculoskeletal: no tremors  - Neurological: alert and oriented  - Psychiatric: normal mood and affect  - Skin: color,  normal.    Data Reviewed:       [x] Reviewed labs    Assessment/Plan:     Primary hypothyroidism:   Uncontrolled, again discussed the risks untreated hypothyroidism. Discussed that the fetal brain development depends on the mother's thyroid hormone, she is now 12 weeks pregnant with uncontrolled hypothyroidism and I am concerned. Some patients do have the symptoms she describes with the levothyroxine when they have not been taking levothyroxine for a long time. Need to start with low dose and titrate it up  Last year when she took 100 mcg consistently TSH was normal which makes me think that is probably the dose she needs.   Switch to Unithroid, could be reacting to the preservatives  Again discussed with the patient to start with half a tablet for 1 week and see if she can increase it to 1 tablet. Need gradual titration however we do not have much time now. If she cannot tolerate 1 tablet to continue half a tablet for 4 weeks, TSH check in 4 weeks to assess the need. Lab slips mailed      Possible right parathyroid adenoma: Reported on the ultrasound  Her calcium has been normal      Pregnant  Discussed to call if she cannot tolerate the medication. Follow-up and Dispositions    · Return in about 4 months (around 8/3/2020). Thank you for allowing me to participate in the care of this patient.     Mable Fine MD      Patient verbalized understanding

## 2020-04-05 RX ORDER — LEVOTHYROXINE SODIUM 125 UG/1
125 TABLET ORAL
Qty: 90 TAB | Refills: 3 | OUTPATIENT
Start: 2020-04-05

## 2020-05-19 LAB — TSH SERPL DL<=0.005 MIU/L-ACNC: 23.29 UIU/ML (ref 0.45–4.5)

## 2020-05-22 ENCOUNTER — TELEPHONE (OUTPATIENT)
Dept: ENDOCRINOLOGY | Age: 40
End: 2020-05-22

## 2020-05-22 DIAGNOSIS — E03.9 HYPOTHYROIDISM, UNSPECIFIED TYPE: Primary | ICD-10-CM

## 2020-05-22 RX ORDER — LEVOTHYROXINE SODIUM 125 UG/1
125 TABLET ORAL
Qty: 90 TAB | Refills: 3 | Status: SHIPPED | OUTPATIENT
Start: 2020-05-22 | End: 2021-03-25 | Stop reason: DRUGHIGH

## 2020-05-22 NOTE — TELEPHONE ENCOUNTER
Per Dr. Jaja Freeman, informed pt of result note, as noted above. Pt verbalized understanding and stated she missed 2 days last week. Informed her I will let Dr. Jaja Freeman know and call her back should she make any chagnes to her dose.  Pt's OB Dr. Cornell Sood has been added to the care team.

## 2020-05-22 NOTE — TELEPHONE ENCOUNTER
----- Message from Gege Rosen MD sent at 5/21/2020 10:38 PM EDT -----  Confirm the dose of the Unithroid she is taking the last 4 weeks, did she miss any medications in the last 4 weeks?   Thyroid function tests has improved from 99-23 but not at goal  Need some adjustment once another compliance    Also please get obstetricians name and we have to fax the report to them  She is pregnant currently

## 2020-05-22 NOTE — TELEPHONE ENCOUNTER
Informed pt that Dr. Jairo Allen will increase dose to 125 mcg and to repeat labs in 6 weeks. Pt verbalized understanding and has lab order for TSH. No further questions or concerns at this time.

## 2020-05-22 NOTE — PROGRESS NOTES
Confirm the dose of the Unithroid she is taking the last 4 weeks, did she miss any medications in the last 4 weeks?   Thyroid function tests has improved from 99-23 but not at goal  Need some adjustment once another compliance    Also please get obstetricians name and we have to fax the report to them  She is pregnant currently

## 2020-06-01 ENCOUNTER — ROUTINE PRENATAL (OUTPATIENT)
Dept: OBGYN CLINIC | Age: 40
End: 2020-06-01

## 2020-06-01 ENCOUNTER — HOSPITAL ENCOUNTER (OUTPATIENT)
Dept: PERINATAL CARE | Age: 40
Discharge: HOME | End: 2020-06-01
Attending: OBSTETRICS & GYNECOLOGY
Payer: COMMERCIAL

## 2020-06-01 VITALS
HEART RATE: 89 BPM | HEIGHT: 60 IN | SYSTOLIC BLOOD PRESSURE: 112 MMHG | WEIGHT: 130 LBS | BODY MASS INDEX: 25.52 KG/M2 | DIASTOLIC BLOOD PRESSURE: 62 MMHG

## 2020-06-01 DIAGNOSIS — O09.522 MULTIGRAVIDA OF ADVANCED MATERNAL AGE IN SECOND TRIMESTER: Primary | ICD-10-CM

## 2020-06-01 PROCEDURE — 76811 OB US DETAILED SNGL FETUS: CPT | Performed by: OBSTETRICS & GYNECOLOGY

## 2020-06-01 NOTE — PROGRESS NOTES
MFM US today. She is seeing Dr Ed Beach for hypothyroidism and meds recently increased. She wants Panorama, Horizon 4 and AFP today.

## 2020-06-08 NOTE — PROGRESS NOTES
Your test for Down syndrome is normal/negative. If you want to know the gender then call the office.

## 2020-06-29 ENCOUNTER — ROUTINE PRENATAL (OUTPATIENT)
Dept: OBGYN CLINIC | Age: 40
End: 2020-06-29

## 2020-06-29 VITALS
HEIGHT: 60 IN | BODY MASS INDEX: 25.48 KG/M2 | SYSTOLIC BLOOD PRESSURE: 120 MMHG | WEIGHT: 129.8 LBS | DIASTOLIC BLOOD PRESSURE: 68 MMHG

## 2020-06-29 DIAGNOSIS — R31.9 HEMATURIA, UNSPECIFIED TYPE: Primary | ICD-10-CM

## 2020-06-29 RX ORDER — PNV NO.153/FA/OM3/DHA/EPA/FISH 400-35-25
TABLET,CHEWABLE ORAL
COMMUNITY
End: 2021-03-22

## 2020-07-02 LAB — BACTERIA UR CULT: ABNORMAL

## 2020-07-02 RX ORDER — NITROFURANTOIN 25; 75 MG/1; MG/1
100 CAPSULE ORAL 2 TIMES DAILY
Qty: 14 CAP | Refills: 0 | Status: SHIPPED | OUTPATIENT
Start: 2020-07-02 | End: 2020-07-09

## 2020-07-02 NOTE — PROGRESS NOTES
At your last visit there was some blood detected on your urine test. A urine culture was sent and it shows a bladder infection. I will send a prescription to your pharmacy to treat it.

## 2020-07-27 ENCOUNTER — ROUTINE PRENATAL (OUTPATIENT)
Dept: OBGYN CLINIC | Age: 40
End: 2020-07-27

## 2020-07-27 VITALS
SYSTOLIC BLOOD PRESSURE: 106 MMHG | DIASTOLIC BLOOD PRESSURE: 58 MMHG | WEIGHT: 130 LBS | HEIGHT: 60 IN | BODY MASS INDEX: 25.52 KG/M2

## 2020-07-27 DIAGNOSIS — Z23 ENCOUNTER FOR IMMUNIZATION: ICD-10-CM

## 2020-07-27 DIAGNOSIS — O09.529 ENCOUNTER FOR SUPERVISION OF HIGH-RISK PREGNANCY WITH MULTIGRAVIDA OF ADVANCED MATERNAL AGE: Primary | ICD-10-CM

## 2020-07-27 DIAGNOSIS — E03.9 HYPOTHYROIDISM, UNSPECIFIED TYPE: ICD-10-CM

## 2020-07-27 DIAGNOSIS — L29.9 PRURITUS: ICD-10-CM

## 2020-07-27 LAB — GTT, FASTING, EXTERNAL: 58

## 2020-07-27 NOTE — PROGRESS NOTES
After obtaining consent, and per orders of Dr Abelino Bumpers, injection of Tdap given in left deltoid by Phillip Phillips LPN. Patient instructed to remain in clinic for 20 minutes afterwards, and to report any adverse reaction to me immediately. Lot: [id number] Exp: 9/19/22 [address]: 14055-093-91.

## 2020-07-27 NOTE — PROGRESS NOTES
She took abx for UTI. Glucola and Tdap today. She c/o itching and will get bile acids. Check TFTs with glucola.

## 2020-07-28 LAB
ERYTHROCYTE [DISTWIDTH] IN BLOOD BY AUTOMATED COUNT: 13.9 % (ref 11.7–15.4)
GLUCOSE 1H P 50 G GLC PO SERPL-MCNC: 58 MG/DL (ref 65–139)
HCT VFR BLD AUTO: 34 % (ref 34–46.6)
HGB BLD-MCNC: 11 G/DL (ref 11.1–15.9)
MCH RBC QN AUTO: 29 PG (ref 26.6–33)
MCHC RBC AUTO-ENTMCNC: 32.4 G/DL (ref 31.5–35.7)
MCV RBC AUTO: 90 FL (ref 79–97)
PLATELET # BLD AUTO: 229 X10E3/UL (ref 150–450)
RBC # BLD AUTO: 3.79 X10E6/UL (ref 3.77–5.28)
T4 FREE SERPL-MCNC: 1.22 NG/DL (ref 0.82–1.77)
TSH SERPL DL<=0.005 MIU/L-ACNC: 1.76 UIU/ML (ref 0.45–4.5)
WBC # BLD AUTO: 6.5 X10E3/UL (ref 3.4–10.8)

## 2020-07-29 LAB — BILE AC SERPL-SCNC: 2.7 UMOL/L (ref 0–10)

## 2020-08-10 ENCOUNTER — ROUTINE PRENATAL (OUTPATIENT)
Dept: OBGYN CLINIC | Age: 40
End: 2020-08-10
Payer: COMMERCIAL

## 2020-08-10 VITALS
WEIGHT: 134 LBS | SYSTOLIC BLOOD PRESSURE: 122 MMHG | BODY MASS INDEX: 26.31 KG/M2 | DIASTOLIC BLOOD PRESSURE: 58 MMHG | HEIGHT: 60 IN

## 2020-08-10 DIAGNOSIS — O09.529 ENCOUNTER FOR SUPERVISION OF HIGH-RISK PREGNANCY WITH MULTIGRAVIDA OF ADVANCED MATERNAL AGE: Primary | ICD-10-CM

## 2020-08-10 PROCEDURE — MISCGLOBALOB GLOBAL OB: Performed by: OBSTETRICS & GYNECOLOGY

## 2020-08-21 ENCOUNTER — ROUTINE PRENATAL (OUTPATIENT)
Dept: OBGYN CLINIC | Age: 40
End: 2020-08-21
Payer: MEDICAID

## 2020-08-21 VITALS
SYSTOLIC BLOOD PRESSURE: 112 MMHG | BODY MASS INDEX: 27.09 KG/M2 | HEIGHT: 60 IN | HEART RATE: 88 BPM | DIASTOLIC BLOOD PRESSURE: 69 MMHG | WEIGHT: 138 LBS

## 2020-08-21 DIAGNOSIS — O09.529 ENCOUNTER FOR SUPERVISION OF HIGH-RISK PREGNANCY WITH MULTIGRAVIDA OF ADVANCED MATERNAL AGE: Primary | ICD-10-CM

## 2020-08-21 PROCEDURE — 76815 OB US LIMITED FETUS(S): CPT | Performed by: OBSTETRICS & GYNECOLOGY

## 2020-08-21 PROCEDURE — 0502F SUBSEQUENT PRENATAL CARE: CPT | Performed by: OBSTETRICS & GYNECOLOGY

## 2020-09-08 ENCOUNTER — ROUTINE PRENATAL (OUTPATIENT)
Dept: OBGYN CLINIC | Age: 40
End: 2020-09-08
Payer: MEDICAID

## 2020-09-08 VITALS
WEIGHT: 140 LBS | SYSTOLIC BLOOD PRESSURE: 124 MMHG | DIASTOLIC BLOOD PRESSURE: 73 MMHG | BODY MASS INDEX: 27.48 KG/M2 | HEIGHT: 60 IN

## 2020-09-08 DIAGNOSIS — O09.529 ENCOUNTER FOR SUPERVISION OF HIGH-RISK PREGNANCY WITH MULTIGRAVIDA OF ADVANCED MATERNAL AGE: Primary | ICD-10-CM

## 2020-09-08 PROCEDURE — 0502F SUBSEQUENT PRENATAL CARE: CPT | Performed by: OBSTETRICS & GYNECOLOGY

## 2020-09-08 NOTE — PROGRESS NOTES
C/o mucous discharge and pressure; Cervic closed; c/o swelling and discomfort in left lower groin and vulva, varicose veins seen in the area; will get an US next wee for position, was breech 2 weeks ago

## 2020-09-14 ENCOUNTER — ROUTINE PRENATAL (OUTPATIENT)
Dept: OBGYN CLINIC | Age: 40
End: 2020-09-14
Payer: MEDICAID

## 2020-09-14 VITALS
DIASTOLIC BLOOD PRESSURE: 74 MMHG | HEIGHT: 60 IN | SYSTOLIC BLOOD PRESSURE: 128 MMHG | BODY MASS INDEX: 27.29 KG/M2 | WEIGHT: 139 LBS

## 2020-09-14 DIAGNOSIS — Z36.85 ANTENATAL SCREENING FOR STREPTOCOCCUS B: ICD-10-CM

## 2020-09-14 DIAGNOSIS — O09.529 ENCOUNTER FOR SUPERVISION OF HIGH-RISK PREGNANCY WITH MULTIGRAVIDA OF ADVANCED MATERNAL AGE: Primary | ICD-10-CM

## 2020-09-14 LAB — GRBS, EXTERNAL: POSITIVE

## 2020-09-14 PROCEDURE — 0502F SUBSEQUENT PRENATAL CARE: CPT | Performed by: OBSTETRICS & GYNECOLOGY

## 2020-09-16 LAB — GP B STREP DNA SPEC QL NAA+PROBE: POSITIVE

## 2020-09-21 ENCOUNTER — OFFICE VISIT (OUTPATIENT)
Dept: ENDOCRINOLOGY | Age: 40
End: 2020-09-21
Payer: COMMERCIAL

## 2020-09-21 ENCOUNTER — ROUTINE PRENATAL (OUTPATIENT)
Dept: OBGYN CLINIC | Age: 40
End: 2020-09-21
Payer: MEDICAID

## 2020-09-21 VITALS
RESPIRATION RATE: 16 BRPM | BODY MASS INDEX: 27.68 KG/M2 | DIASTOLIC BLOOD PRESSURE: 59 MMHG | HEART RATE: 77 BPM | SYSTOLIC BLOOD PRESSURE: 121 MMHG | HEIGHT: 60 IN | TEMPERATURE: 97.7 F | OXYGEN SATURATION: 99 % | WEIGHT: 141 LBS

## 2020-09-21 VITALS
WEIGHT: 143 LBS | HEIGHT: 60 IN | DIASTOLIC BLOOD PRESSURE: 68 MMHG | BODY MASS INDEX: 28.07 KG/M2 | SYSTOLIC BLOOD PRESSURE: 138 MMHG

## 2020-09-21 DIAGNOSIS — O09.529 ENCOUNTER FOR SUPERVISION OF HIGH-RISK PREGNANCY WITH MULTIGRAVIDA OF ADVANCED MATERNAL AGE: Primary | ICD-10-CM

## 2020-09-21 DIAGNOSIS — E03.9 ACQUIRED HYPOTHYROIDISM: Primary | ICD-10-CM

## 2020-09-21 LAB
T4 FREE SERPL-MCNC: 0.9 NG/DL (ref 0.8–1.5)
TSH SERPL DL<=0.05 MIU/L-ACNC: 12.7 UIU/ML (ref 0.36–3.74)

## 2020-09-21 PROCEDURE — 0502F SUBSEQUENT PRENATAL CARE: CPT | Performed by: OBSTETRICS & GYNECOLOGY

## 2020-09-21 PROCEDURE — 99213 OFFICE O/P EST LOW 20 MIN: CPT | Performed by: INTERNAL MEDICINE

## 2020-09-21 NOTE — PROGRESS NOTES
Brianne Velez is a 46 y.o. female here for   Chief Complaint   Patient presents with    Thyroid Problem       1. Have you been to the ER, urgent care clinic since your last visit? Hospitalized since your last visit? -no    2. Have you seen or consulted any other health care providers outside of the [address] [place] [address] since your last visit? Include any pap smears or colon screening. -OBGYN

## 2020-09-21 NOTE — PROGRESS NOTES
Reynaldo Son MD        Patient Information  Date:9/21/2020  Name : Klever Hernandez 46 y.o.     YOB: 1980             Chief Complaint   Patient presents with    Thyroid Problem       History of present illness        Klever Hernandez is a 46 y.o. female   She reestablished care in August 2019  She was seen in 2013 for primary hypothyroidism, she was pregnant then. She has long-standing history of hypothyroidism, was not compliant with the levothyroxine, levels have been fluctuating widely, highest TSH was 166,. Had ultrasound thyroid in May 2019 which showed diffuse goiter with possible right parathyroid adenoma. Calcium levels were normal.  No hypercalcemia, kidney stones, family history of hypercalcemia or kidney stones  She is currently in third trimester of pregnancy, Unithroid dose was increased to 125 mcg, TSH was normal.  Reportedly did not have time to get the refills, so she went back 200 mcg levothyroxine. TSH is elevated  No significant weight gain        Prior history  Recent TSH is 99, she has stopped taking levothyroxine due to side effects. Reports chest pain whenever she takes levothyroxine, not really tremors, nervousness, jitteriness. Feels better when she stops. Thinks that the dose is higher. Last year when she took it consistently 100 mcg TSH was normal  Did not have menstrual cycles for 3 months, found to be pregnant, 12 weeks now    No weight gain, fatigue, constipation, dry skin  No chest pain or shortness of breath    Past Medical History:   Diagnosis Date    Acquired hypothyroidism     hypothyroidism - takes 75 mcg synthroid    Anemia     Anemia affecting pregnancy in third trimester     Depression     pt reports to RN \"when I was in my teens\"    Tobacco abuse        Current Outpatient Medications   Medication Sig    Unithroid 125 mcg tablet Take 1 Tab by mouth Daily (before breakfast). Brand Medically Necessary. Stop 100 mcg.  ferrous sulfate 325 mg (65 mg iron) tablet Take 325 mg by mouth Daily (before breakfast).  PNV no.[id number]-[id number] (Prenatal Gummies) 400 mcg-35 mg- 25 mg-5 mg chew Take  by mouth. Pt reports eating 6-7 gummies nightly    ascorbic acid, vitamin C, (VITAMIN C) 250 mg tablet Take 250 mg by mouth as needed. No current facility-administered medications for this visit. Review of Systems:  - Constitutional Symptoms: no fevers, chills,  - Eyes: no blurry vision or double vision  - Cardiovascular: No chest pain or palpitations  - Respiratory: no cough or shortness of breath  - Gastrointestinal: no dysphagia or abdominal pain  - Musculoskeletal: no joint pains   -     Physical Examination:  - Blood pressure (!) 121/59, pulse 77, temperature 97.7 °F (36.5 °C), temperature source Oral, resp. rate 16, height 5' (1.524 m), weight 141 lb (64 kg), last menstrual period 01/08/2020, SpO2 99 %, not currently breastfeeding. Body mass index is 27.54 kg/m². - General: pleasant, no distress, good eye contact  - HEENT: no redness, no periorbital edema, EOMI  - Neck: No thyromegaly  - CVS: S1-S2 regular  - RS: Normal respiratory effort  - Musculoskeletal: no tremors  - Neurological: alert and oriented  - Psychiatric: normal mood and affect  - Skin: color,  normal.    Data Reviewed:       [x] Reviewed labs    Assessment/Plan:     Primary hypothyroidism:   Noncompliant with the thyroxine replacement  By now fetal thyroid would have developed and not dependent on mother's thyroxine levels  Resume 125 mcg, discontinue 100 mcg Unithroid    Some patients do have the symptoms she describes with the levothyroxine when they have not been taking levothyroxine for a long time. Need to start with low dose and titrate it up  Last year when she took 100 mcg consistently TSH was normal which makes me think that is probably the dose she needs.         Possible right parathyroid adenoma: Reported on the ultrasound  Her calcium has been normal  No kidney stones    She is currently in her third trimester of pregnancy: Discussed about monitoring the thyroid levels after pregnancy, may need decrease in the dose      Counseled at length        Thank you for allowing me to participate in the care of this patient.     Heide Holcomb MD      Patient verbalized understanding

## 2020-09-21 NOTE — LETTER
9/22/20 Patient: Vaishnavi Beth YOB: 1980 Date of Visit: 9/21/2020 Alfonso Mendez MD 
VIA In Basket Dear Alfonso Mendez MD, Thank you for referring Ms. Vaishnavi Beth to [address] [place] [address] for evaluation. My notes for this consultation are attached. If you have questions, please do not hesitate to call me. I look forward to following your patient along with you. Sincerely, Carlitos Encarnacion MD

## 2020-09-22 NOTE — PROGRESS NOTES
Notify patient that she needs to pick 125 mcg Unithroid as soon as possible . Blood tests are suggesting that she is not getting enough. She did well on 125 mcg Unithroid a month ago.

## 2020-09-22 NOTE — PROGRESS NOTES
She did well on 125 mcg of Unithroid. Once they ran out she went back to the old dose as she did not have time to  pick the prescription from the pharmacy.

## 2020-09-23 ENCOUNTER — TELEPHONE (OUTPATIENT)
Dept: ENDOCRINOLOGY | Age: 40
End: 2020-09-23

## 2020-09-23 NOTE — TELEPHONE ENCOUNTER
----- Message from Pasquale Bingham MD sent at 9/22/2020  9:00 AM EDT -----  Notify patient that she needs to pick 125 mcg Unithroid as soon as possible . Blood tests are suggesting that she is not getting enough. She did well on 125 mcg Unithroid a month ago.

## 2020-09-23 NOTE — TELEPHONE ENCOUNTER
Per Dr. Kriss Walker, informed pt of result note, as noted above. Pt verbalized understanding with no further questions or concerns at this time.

## 2020-09-29 ENCOUNTER — HOSPITAL ENCOUNTER (OUTPATIENT)
Dept: LAB | Age: 40
Discharge: HOME | End: 2020-09-29
Payer: COMMERCIAL

## 2020-09-29 ENCOUNTER — ROUTINE PRENATAL (OUTPATIENT)
Dept: OBGYN CLINIC | Age: 40
End: 2020-09-29
Payer: MEDICAID

## 2020-09-29 ENCOUNTER — TRANSCRIBE ORDER (OUTPATIENT)
Dept: REGISTRATION | Age: 40
End: 2020-09-29

## 2020-09-29 VITALS
SYSTOLIC BLOOD PRESSURE: 118 MMHG | WEIGHT: 139 LBS | BODY MASS INDEX: 27.29 KG/M2 | HEIGHT: 60 IN | DIASTOLIC BLOOD PRESSURE: 62 MMHG

## 2020-09-29 DIAGNOSIS — Z01.812 PRE-PROCEDURAL LABORATORY EXAMINATIONS: Primary | ICD-10-CM

## 2020-09-29 DIAGNOSIS — O09.529 ENCOUNTER FOR SUPERVISION OF HIGH-RISK PREGNANCY WITH MULTIGRAVIDA OF ADVANCED MATERNAL AGE: Primary | ICD-10-CM

## 2020-09-29 DIAGNOSIS — Z01.812 PRE-PROCEDURAL LABORATORY EXAMINATIONS: ICD-10-CM

## 2020-09-29 PROCEDURE — 87635 SARS-COV-2 COVID-19 AMP PRB: CPT

## 2020-09-29 PROCEDURE — 0502F SUBSEQUENT PRENATAL CARE: CPT | Performed by: OBSTETRICS & GYNECOLOGY

## 2020-09-30 LAB — SARS-COV-2, COV2NT: NOT DETECTED

## 2020-10-05 ENCOUNTER — ROUTINE PRENATAL (OUTPATIENT)
Dept: OBGYN CLINIC | Age: 40
End: 2020-10-05
Payer: MEDICAID

## 2020-10-05 VITALS
HEIGHT: 60 IN | BODY MASS INDEX: 28.07 KG/M2 | SYSTOLIC BLOOD PRESSURE: 140 MMHG | WEIGHT: 143 LBS | DIASTOLIC BLOOD PRESSURE: 72 MMHG

## 2020-10-05 DIAGNOSIS — O09.529 ENCOUNTER FOR SUPERVISION OF HIGH-RISK PREGNANCY WITH MULTIGRAVIDA OF ADVANCED MATERNAL AGE: Primary | ICD-10-CM

## 2020-10-05 DIAGNOSIS — L29.9 ITCHING: ICD-10-CM

## 2020-10-05 PROCEDURE — 0502F SUBSEQUENT PRENATAL CARE: CPT | Performed by: OBSTETRICS & GYNECOLOGY

## 2020-10-08 LAB
ALBUMIN SERPL-MCNC: 3.4 G/DL (ref 3.8–4.8)
ALBUMIN/GLOB SERPL: 1.2 {RATIO} (ref 1.2–2.2)
ALP SERPL-CCNC: 165 IU/L (ref 39–117)
ALT SERPL-CCNC: 15 IU/L (ref 0–32)
AST SERPL-CCNC: 31 IU/L (ref 0–40)
BILE AC SERPL-SCNC: 4.5 UMOL/L (ref 0–10)
BILIRUB SERPL-MCNC: 0.3 MG/DL (ref 0–1.2)
BUN SERPL-MCNC: 4 MG/DL (ref 6–20)
BUN/CREAT SERPL: 7 (ref 9–23)
CALCIUM SERPL-MCNC: 8.9 MG/DL (ref 8.7–10.2)
CHLORIDE SERPL-SCNC: 101 MMOL/L (ref 96–106)
CO2 SERPL-SCNC: 21 MMOL/L (ref 20–29)
CREAT SERPL-MCNC: 0.59 MG/DL (ref 0.57–1)
ERYTHROCYTE [DISTWIDTH] IN BLOOD BY AUTOMATED COUNT: 16.1 % (ref 11.7–15.4)
GLOBULIN SER CALC-MCNC: 2.8 G/DL (ref 1.5–4.5)
GLUCOSE SERPL-MCNC: 81 MG/DL (ref 65–99)
HCT VFR BLD AUTO: 30.4 % (ref 34–46.6)
HGB BLD-MCNC: 9.7 G/DL (ref 11.1–15.9)
MCH RBC QN AUTO: 25.3 PG (ref 26.6–33)
MCHC RBC AUTO-ENTMCNC: 31.9 G/DL (ref 31.5–35.7)
MCV RBC AUTO: 79 FL (ref 79–97)
PLATELET # BLD AUTO: 273 X10E3/UL (ref 150–450)
POTASSIUM SERPL-SCNC: 4 MMOL/L (ref 3.5–5.2)
PROT SERPL-MCNC: 6.2 G/DL (ref 6–8.5)
RBC # BLD AUTO: 3.84 X10E6/UL (ref 3.77–5.28)
SODIUM SERPL-SCNC: 135 MMOL/L (ref 134–144)
WBC # BLD AUTO: 6.5 X10E3/UL (ref 3.4–10.8)

## 2020-10-12 ENCOUNTER — ROUTINE PRENATAL (OUTPATIENT)
Dept: OBGYN CLINIC | Age: 40
End: 2020-10-12
Payer: MEDICAID

## 2020-10-12 VITALS
DIASTOLIC BLOOD PRESSURE: 76 MMHG | BODY MASS INDEX: 27.88 KG/M2 | HEIGHT: 60 IN | SYSTOLIC BLOOD PRESSURE: 138 MMHG | WEIGHT: 142 LBS

## 2020-10-12 DIAGNOSIS — O09.529 ENCOUNTER FOR SUPERVISION OF HIGH-RISK PREGNANCY WITH MULTIGRAVIDA OF ADVANCED MATERNAL AGE: Primary | ICD-10-CM

## 2020-10-12 PROCEDURE — 0502F SUBSEQUENT PRENATAL CARE: CPT | Performed by: OBSTETRICS & GYNECOLOGY

## 2020-10-16 ENCOUNTER — HOSPITAL ENCOUNTER (INPATIENT)
Age: 40
LOS: 2 days | Discharge: HOME | End: 2020-10-18
Attending: OBSTETRICS & GYNECOLOGY | Admitting: OBSTETRICS & GYNECOLOGY
Payer: COMMERCIAL

## 2020-10-16 DIAGNOSIS — R52 POSTPARTUM PAIN: Primary | ICD-10-CM

## 2020-10-16 DIAGNOSIS — Z34.90 PREGNANCY, UNSPECIFIED GESTATIONAL AGE: ICD-10-CM

## 2020-10-16 LAB
BASOPHILS # BLD: 0.1 K/UL (ref 0–0.1)
BASOPHILS NFR BLD: 1 % (ref 0–1)
DIFFERENTIAL METHOD BLD: ABNORMAL
EOSINOPHIL # BLD: 0.1 K/UL (ref 0–0.4)
EOSINOPHIL NFR BLD: 1 % (ref 0–7)
ERYTHROCYTE [DISTWIDTH] IN BLOOD BY AUTOMATED COUNT: 17.4 % (ref 11.5–14.5)
HCT VFR BLD AUTO: 32.8 % (ref 35–47)
HGB BLD-MCNC: 10.2 G/DL (ref 11.5–16)
IMM GRANULOCYTES # BLD AUTO: 0 K/UL (ref 0–0.04)
IMM GRANULOCYTES NFR BLD AUTO: 1 % (ref 0–0.5)
LYMPHOCYTES # BLD: 1.4 K/UL (ref 0.8–3.5)
LYMPHOCYTES NFR BLD: 21 % (ref 12–49)
MCH RBC QN AUTO: 25.2 PG (ref 26–34)
MCHC RBC AUTO-ENTMCNC: 31.1 G/DL (ref 30–36.5)
MCV RBC AUTO: 81.2 FL (ref 80–99)
MONOCYTES # BLD: 0.4 K/UL (ref 0–1)
MONOCYTES NFR BLD: 5 % (ref 5–13)
NEUTS SEG # BLD: 4.6 K/UL (ref 1.8–8)
NEUTS SEG NFR BLD: 71 % (ref 32–75)
NRBC # BLD: 0 K/UL (ref 0–0.01)
NRBC BLD-RTO: 0 PER 100 WBC
PLATELET # BLD AUTO: 255 K/UL (ref 150–400)
PMV BLD AUTO: 10.4 FL (ref 8.9–12.9)
RBC # BLD AUTO: 4.04 M/UL (ref 3.8–5.2)
WBC # BLD AUTO: 6.5 K/UL (ref 3.6–11)

## 2020-10-16 PROCEDURE — 3E033VJ INTRODUCTION OF OTHER HORMONE INTO PERIPHERAL VEIN, PERCUTANEOUS APPROACH: ICD-10-PCS | Performed by: OBSTETRICS & GYNECOLOGY

## 2020-10-16 PROCEDURE — 75410000002 HC LABOR FEE PER 1 HR: Performed by: OBSTETRICS & GYNECOLOGY

## 2020-10-16 PROCEDURE — 10907ZC DRAINAGE OF AMNIOTIC FLUID, THERAPEUTIC FROM PRODUCTS OF CONCEPTION, VIA NATURAL OR ARTIFICIAL OPENING: ICD-10-PCS | Performed by: OBSTETRICS & GYNECOLOGY

## 2020-10-16 PROCEDURE — 74011000258 HC RX REV CODE- 258: Performed by: OBSTETRICS & GYNECOLOGY

## 2020-10-16 PROCEDURE — 75410000003 HC RECOV DEL/VAG/CSECN EA 0.5 HR: Performed by: OBSTETRICS & GYNECOLOGY

## 2020-10-16 PROCEDURE — 75410000000 HC DELIVERY VAGINAL/SINGLE: Performed by: OBSTETRICS & GYNECOLOGY

## 2020-10-16 PROCEDURE — 74011250637 HC RX REV CODE- 250/637: Performed by: OBSTETRICS & GYNECOLOGY

## 2020-10-16 PROCEDURE — 65270000029 HC RM PRIVATE

## 2020-10-16 PROCEDURE — 59400 OBSTETRICAL CARE: CPT | Performed by: OBSTETRICS & GYNECOLOGY

## 2020-10-16 PROCEDURE — 85025 COMPLETE CBC W/AUTO DIFF WBC: CPT

## 2020-10-16 PROCEDURE — 36415 COLL VENOUS BLD VENIPUNCTURE: CPT

## 2020-10-16 PROCEDURE — 74011250636 HC RX REV CODE- 250/636: Performed by: OBSTETRICS & GYNECOLOGY

## 2020-10-16 RX ORDER — NALOXONE HYDROCHLORIDE 0.4 MG/ML
0.4 INJECTION, SOLUTION INTRAMUSCULAR; INTRAVENOUS; SUBCUTANEOUS AS NEEDED
Status: DISCONTINUED | OUTPATIENT
Start: 2020-10-16 | End: 2020-10-16

## 2020-10-16 RX ORDER — HYDROCODONE BITARTRATE AND ACETAMINOPHEN 5; 325 MG/1; MG/1
1 TABLET ORAL
Qty: 15 TAB | Refills: 0 | Status: SHIPPED | OUTPATIENT
Start: 2020-10-16 | End: 2020-10-21

## 2020-10-16 RX ORDER — NALOXONE HYDROCHLORIDE 0.4 MG/ML
0.4 INJECTION, SOLUTION INTRAMUSCULAR; INTRAVENOUS; SUBCUTANEOUS AS NEEDED
Status: DISCONTINUED | OUTPATIENT
Start: 2020-10-16 | End: 2020-10-18 | Stop reason: HOSPADM

## 2020-10-16 RX ORDER — SODIUM CHLORIDE 0.9 % (FLUSH) 0.9 %
5-40 SYRINGE (ML) INJECTION AS NEEDED
Status: DISCONTINUED | OUTPATIENT
Start: 2020-10-16 | End: 2020-10-16

## 2020-10-16 RX ORDER — OXYTOCIN/RINGER'S LACTATE 30/500 ML
95 PLASTIC BAG, INJECTION (ML) INTRAVENOUS AS NEEDED
Status: COMPLETED | OUTPATIENT
Start: 2020-10-16 | End: 2020-10-16

## 2020-10-16 RX ORDER — DOCUSATE SODIUM 100 MG/1
100 CAPSULE, LIQUID FILLED ORAL
Status: DISCONTINUED | OUTPATIENT
Start: 2020-10-16 | End: 2020-10-18 | Stop reason: HOSPADM

## 2020-10-16 RX ORDER — DIPHENHYDRAMINE HCL 25 MG
25 CAPSULE ORAL
Status: DISCONTINUED | OUTPATIENT
Start: 2020-10-16 | End: 2020-10-18 | Stop reason: HOSPADM

## 2020-10-16 RX ORDER — SODIUM CHLORIDE 0.9 % (FLUSH) 0.9 %
5-40 SYRINGE (ML) INJECTION EVERY 8 HOURS
Status: DISCONTINUED | OUTPATIENT
Start: 2020-10-16 | End: 2020-10-16

## 2020-10-16 RX ORDER — IBUPROFEN 800 MG/1
800 TABLET ORAL
Qty: 20 TAB | Refills: 1 | Status: SHIPPED | OUTPATIENT
Start: 2020-10-16 | End: 2021-10-25

## 2020-10-16 RX ORDER — FENTANYL/BUPIVACAINE/NS/PF 2-1250MCG
1-16 PREFILLED PUMP RESERVOIR EPIDURAL CONTINUOUS
Status: DISCONTINUED | OUTPATIENT
Start: 2020-10-16 | End: 2020-10-16

## 2020-10-16 RX ORDER — SIMETHICONE 80 MG
80 TABLET,CHEWABLE ORAL
Status: DISCONTINUED | OUTPATIENT
Start: 2020-10-16 | End: 2020-10-18 | Stop reason: HOSPADM

## 2020-10-16 RX ORDER — HYDROCORTISONE ACETATE PRAMOXINE HCL 2.5; 1 G/100G; G/100G
CREAM TOPICAL AS NEEDED
Status: DISCONTINUED | OUTPATIENT
Start: 2020-10-16 | End: 2020-10-18 | Stop reason: HOSPADM

## 2020-10-16 RX ORDER — HYDROMORPHONE HYDROCHLORIDE 2 MG/ML
1 INJECTION, SOLUTION INTRAMUSCULAR; INTRAVENOUS; SUBCUTANEOUS
Status: DISCONTINUED | OUTPATIENT
Start: 2020-10-16 | End: 2020-10-18 | Stop reason: HOSPADM

## 2020-10-16 RX ORDER — ONDANSETRON 2 MG/ML
4 INJECTION INTRAMUSCULAR; INTRAVENOUS
Status: DISCONTINUED | OUTPATIENT
Start: 2020-10-16 | End: 2020-10-18 | Stop reason: HOSPADM

## 2020-10-16 RX ORDER — HYDROCODONE BITARTRATE AND ACETAMINOPHEN 7.5; 325 MG/1; MG/1
1 TABLET ORAL
Status: DISCONTINUED | OUTPATIENT
Start: 2020-10-16 | End: 2020-10-18 | Stop reason: HOSPADM

## 2020-10-16 RX ORDER — ZOLPIDEM TARTRATE 5 MG/1
5 TABLET ORAL
Status: DISCONTINUED | OUTPATIENT
Start: 2020-10-16 | End: 2020-10-18 | Stop reason: HOSPADM

## 2020-10-16 RX ORDER — OXYTOCIN/RINGER'S LACTATE 30/500 ML
10 PLASTIC BAG, INJECTION (ML) INTRAVENOUS AS NEEDED
Status: DISCONTINUED | OUTPATIENT
Start: 2020-10-16 | End: 2020-10-16

## 2020-10-16 RX ORDER — OXYTOCIN/RINGER'S LACTATE 30/500 ML
10 PLASTIC BAG, INJECTION (ML) INTRAVENOUS AS NEEDED
Status: DISCONTINUED | OUTPATIENT
Start: 2020-10-16 | End: 2020-10-17

## 2020-10-16 RX ORDER — IBUPROFEN 800 MG/1
800 TABLET ORAL
Status: DISCONTINUED | OUTPATIENT
Start: 2020-10-16 | End: 2020-10-18 | Stop reason: HOSPADM

## 2020-10-16 RX ORDER — ACETAMINOPHEN 325 MG/1
650 TABLET ORAL
Status: DISCONTINUED | OUTPATIENT
Start: 2020-10-16 | End: 2020-10-18 | Stop reason: HOSPADM

## 2020-10-16 RX ORDER — EPHEDRINE SULFATE/0.9% NACL/PF 50 MG/5 ML
10 SYRINGE (ML) INTRAVENOUS
Status: DISCONTINUED | OUTPATIENT
Start: 2020-10-16 | End: 2020-10-16

## 2020-10-16 RX ORDER — SODIUM CHLORIDE, SODIUM LACTATE, POTASSIUM CHLORIDE, CALCIUM CHLORIDE 600; 310; 30; 20 MG/100ML; MG/100ML; MG/100ML; MG/100ML
125 INJECTION, SOLUTION INTRAVENOUS CONTINUOUS
Status: DISCONTINUED | OUTPATIENT
Start: 2020-10-16 | End: 2020-10-16

## 2020-10-16 RX ORDER — NALOXONE HYDROCHLORIDE 0.4 MG/ML
0.4 INJECTION, SOLUTION INTRAMUSCULAR; INTRAVENOUS; SUBCUTANEOUS ONCE
Status: DISCONTINUED | OUTPATIENT
Start: 2020-10-16 | End: 2020-10-16

## 2020-10-16 RX ORDER — LIDOCAINE HYDROCHLORIDE 10 MG/ML
INJECTION INFILTRATION; PERINEURAL
Status: DISCONTINUED
Start: 2020-10-16 | End: 2020-10-16 | Stop reason: WASHOUT

## 2020-10-16 RX ADMIN — IBUPROFEN 800 MG: 800 TABLET ORAL at 15:46

## 2020-10-16 RX ADMIN — OXYTOCIN 95 MILLI-UNITS/MIN: 10 INJECTION, SOLUTION INTRAMUSCULAR; INTRAVENOUS at 15:34

## 2020-10-16 RX ADMIN — SODIUM CHLORIDE 5 MILLION UNITS: 900 INJECTION, SOLUTION INTRAVENOUS at 07:33

## 2020-10-16 RX ADMIN — SODIUM CHLORIDE 2.5 MILLION UNITS: 900 INJECTION, SOLUTION INTRAVENOUS at 11:50

## 2020-10-16 RX ADMIN — Medication 250 MILLI-UNITS/MIN: at 13:40

## 2020-10-16 RX ADMIN — IBUPROFEN 800 MG: 800 TABLET ORAL at 23:55

## 2020-10-16 RX ADMIN — LEVOTHYROXINE SODIUM 125 MCG: 0.03 TABLET ORAL at 14:22

## 2020-10-16 RX ADMIN — SODIUM CHLORIDE, SODIUM LACTATE, POTASSIUM CHLORIDE, AND CALCIUM CHLORIDE 125 ML/HR: 600; 310; 30; 20 INJECTION, SOLUTION INTRAVENOUS at 09:32

## 2020-10-16 RX ADMIN — SODIUM CHLORIDE, SODIUM LACTATE, POTASSIUM CHLORIDE, AND CALCIUM CHLORIDE 125 ML/HR: 600; 310; 30; 20 INJECTION, SOLUTION INTRAVENOUS at 07:23

## 2020-10-16 RX ADMIN — Medication 999 MILLI-UNITS/MIN: at 13:13

## 2020-10-16 RX ADMIN — Medication 500 MILLI-UNITS/MIN: at 13:29

## 2020-10-16 NOTE — PROGRESS NOTES
CTSP for ?  Breech presentation on nursing exam. Vertex by US and cervical exam shows vertex, 70%, 5 cm, -3

## 2020-10-16 NOTE — DISCHARGE SUMMARY
Obstetrical Discharge Summary     Name: Krysta Pena MRN: [id number]  SSN: [id number]    YOB: 1980  Age: 46 y.o. Sex: female      Admit Date: 10/16/2020    Discharge Date: 10/18/2020     Admitting Physician: Ananda Dumont MD     Attending Physician:  Radha Peterson MD     Admission Diagnoses: Pregnant [Z34.90]    Discharge Diagnoses:   Information for the patient's :  [place] Genta Male Melisa Llanes [[id number]]   Delivery of a 8 lb 14 oz (4.025 kg) male infant via Vaginal, Spontaneous on 10/16/2020 at 1:12 PM  by Ananda Dumont. Apgars were 9  and 9 . Additional Diagnoses:   Hospital Problems  Date Reviewed: 10/12/2020          Codes Class Noted POA    Pregnant ICD-10-CM: Z34.90  ICD-9-CM: V22.2  10/16/2020 Unknown             Lab Results   Component Value Date/Time    Rubella, External Immune 2020    GrBStrep, External Positive 2020       Hospital Course: Normal hospital course following the delivery. Condition on discharge: stable  Disposition: Home  Patient Instructions:   Current Discharge Medication List      START taking these medications    Details   ibuprofen (MOTRIN) 800 mg tablet Take 1 Tab by mouth every eight (8) hours as needed for Pain. Qty: 20 Tab, Refills: 1      HYDROcodone-acetaminophen (NORCO) 5-325 mg per tablet Take 1 Tab by mouth every six (6) hours as needed for Pain for up to 5 days. Max Daily Amount: 4 Tabs. Qty: 15 Tab, Refills: 0    Associated Diagnoses: Postpartum pain         CONTINUE these medications which have NOT CHANGED    Details   Unithroid 125 mcg tablet Take 1 Tab by mouth Daily (before breakfast). Brand Medically Necessary. Stop 100 mcg. Qty: 90 Tab, Refills: 3    Associated Diagnoses: Hypothyroidism, unspecified type      ferrous sulfate 325 mg (65 mg iron) tablet Take 325 mg by mouth Daily (before breakfast). PNV no.[id number]-dha-epa-fish (Prenatal Gummies) 400 mcg-35 mg- 25 mg-5 mg chew Take  by mouth.  Pt reports eating 6-7 gummies nightly      ascorbic acid, vitamin C, (VITAMIN C) 250 mg tablet Take 250 mg by mouth as needed. Reference my discharge instructions.     Follow-up Appointments   Procedures    FOLLOW UP VISIT Appointment in: 6 Weeks     Standing Status:   Standing     Number of Occurrences:   1     Order Specific Question:   Appointment in     Answer:   6 Weeks        Signed By:  Curry Fernández MD     October 16, 2020

## 2020-10-16 NOTE — PROCEDURES
Delivery Note    Patient delivered by . 8 pounds 14 ounces. Apgars 9 @ one minute and 9 @ five minutes. no episiotomy, no tear, repaired with n/a. Third stage normal. Placenta delivered spontaneously. EBL 150cc. Uterus and vagina clear of sponges.   TBLMI

## 2020-10-16 NOTE — LACTATION NOTE
This note was copied from a baby's chart. Mom has changed her mind and plans to just formula feed for now. May want to talk with lactation in the morning.   Gave her one of our breast feeding booklets and will check with mom in am.

## 2020-10-16 NOTE — PROGRESS NOTES
S/ no complaints, voiding well O/  VSS, AF Abdomen NT, fundus firm A/  Postpartum day 1 , stable P/  Routine care

## 2020-10-16 NOTE — PROGRESS NOTES
0540- Pt ambulates onto the unit in stable condition. Pt is here for induction. 0700- Bedside shift change report given to MARI Hernandez RN (oncoming nurse) by DORA Zuniga RN (offgoing nurse). Report included the following information SBAR, Intake/Output, MAR and Recent Results.

## 2020-10-16 NOTE — DISCHARGE INSTRUCTIONS
[facility]  Name: Jessica Rosario  YOB: 1980      General:     Diet/Diet Restrictions:  Try to drink eight 8-ounce glasses of fluid daily (water, juices); avoid excessive caffeine intake. Meals/snacks as desired which are high in fiber and carbohydrates and low in fat and cholesterol. Medications:         Physical Activity / Restrictions / Safety:     Avoid heavy lifting, no more that 10 lbs. for 2-3 weeks; No driving while taking narcotic pain medication. Post  patients should not drive until pain free. No intercourse for 4-6 weeks if you had a vaginal delivery and 6 weeks if you had a  Section. Always use contraception even if you are breastfeeding. No douching or tampon use. May resume exercise in 6 weeks. Discharge Instructions/Special Treatment/Home Care Needs:     Continue prenatal vitamins. Continue to use squirt bottle with warm water on your episiotomy after each bathroom use until bleeding stops. If you had a  Section and if steri-strips were applied to your incision, remove them in 7 days. If they fall off before then it's okay. If you are sent home with staples still on the incision then call the office to make an appointment within the next week to remove them. Take stool softeners (Colace) daily when you first get home and then as needed for constipation. Take them for 3-4 weeks if you had a large vaginal tear. Call your doctor for the following:     Fever over 101 degrees by mouth. Vaginal bleeding heavier than a normal menstrual period or clots larger than a golf ball. Red streaks or increased swelling of legs, painful red streaks on your breast.  Painful urination, or increased pain, redness or discharge with your incision. Pain Management:     Pain Management:   Take Acetaminophen (Tylenol) or Ibuprofen (Advil, Motrin) as directed for pain.   Take perscribed narcotic meds if over the counter meds are not adequately controlling your pain. Use a warm Sitz bath 3 times daily to relieve episiotomy or hemorrhoidal discomfort. A heating pad applied to your lower abdomen can help relieve  incision pain as well as uterine cramps from delivery. For hemorrhoidal discomfort use Tucks pads and Anusol cream/Preparation H as needed and directed. Follow-Up Care:     Unless instructed otherwise, make an appointment for 6 weeks after delivery.   Telephone number: [phone]    Signed By: Kelby Ferguson MD                                                                                                   Date: 10/16/2020 Time: 4:33 PM

## 2020-10-16 NOTE — H&P
History & Physical    Name: Hui Puckett MRN: [id number]  SSN: [id number]    YOB: 1980  Age: 46 y.o. Sex: female        Subjective:     Estimated Date of Delivery: 10/14/20  OB History        9    Para   8    Term   8       0    AB   0    Living   8       SAB   0    TAB   0    Ectopic   0    Molar        Multiple   0    Live Births   8                Ms. Nigel Pappas is admitted with pregnancy at 40w2d for induction of labor. Prenatal course was normal. Please see prenatal records for details.     Past Medical History:   Diagnosis Date    Acquired hypothyroidism     hypothyroidism - takes 75 mcg synthroid    Anemia     Anemia affecting pregnancy in third trimester     Depression     pt reports to RN \"when I was in my teens\"    Tobacco abuse      No past surgical history on file.  @  Social History     Socioeconomic History    Marital status:      Spouse name: Elin Carrion Number of children: 11    Years of education: Not on file    Highest education level: Not on file   Occupational History     Employer: NOT EMPLOYED   Social Needs    Financial resource strain: Not on file    Food insecurity     Worry: Not on file     Inability: Not on file   [employer] needs     Medical: Not on file     Non-medical: Not on file   Tobacco Use    Smoking status: Former Smoker     Packs/day: 0.30     Years: 20.00     Pack years: 6.00     Types: Cigarettes     Last attempt to quit: 2020     Years since quittin.6    Smokeless tobacco: Never Used    Tobacco comment: Quit when she found out she was pregnant   Substance and Sexual Activity    Alcohol use: No     Alcohol/week: 0.0 standard drinks    Drug use: No    Sexual activity: Yes     Partners: Male     Birth control/protection: None   Lifestyle    Physical activity     Days per week: Not on file     Minutes per session: Not on file    Stress: Not on file   Relationships    Social connections     Talks on phone: Not on file Gets together: Not on file     Attends Nondenominational service: Not on file     Active member of club or organization: Not on file     Attends meetings of clubs or organizations: Not on file     Relationship status: Not on file    Intimate partner violence     Fear of current or ex partner: Not on file     Emotionally abused: Not on file     Physically abused: Not on file     Forced sexual activity: Not on file   Other Topics Concern    Not on file   Social History Narrative    Not on file     Family History   Problem Relation Age of Onset    Cancer Maternal Aunt     Diabetes Maternal Uncle     Cancer Maternal Grandfather     Heart Disease Mother     Heart Disease Father        No Known Allergies  Prior to Admission medications    Medication Sig Start Date End Date Taking? Authorizing Provider   PNV no.[id number]-dha-epa-fish (Prenatal Gummies) 400 mcg-35 mg- 25 mg-5 mg chew Take  by mouth. Pt reports eating 6-7 gummies nightly    Provider, Historical   Unithroid 125 mcg tablet Take 1 Tab by mouth Daily (before breakfast). Brand Medically Necessary. Stop 100 mcg. 5/22/20   Thuy Ibarra MD   ferrous sulfate 325 mg (65 mg iron) tablet Take 325 mg by mouth Daily (before breakfast). Provider, Historical   ascorbic acid, vitamin C, (VITAMIN C) 250 mg tablet Take 250 mg by mouth as needed. Provider, Historical        Review of Systems: A comprehensive review of systems was negative except for that written in the HPI. Objective:     Vitals:  Vitals:    10/16/20 0622   BP: 133/77   Pulse: 80        Physical Exam:  Chest: clear  Heart: RRR  Abdomen: Gravid  Cervix 50/3/-3/vtx  Membranes:  Artificial Rupture of Membranes;  Amniotic Fluid: medium amount of clear fluid  Fetal Heart Rate: Reactive    Prenatal Labs:   Lab Results   Component Value Date/Time    Rubella, External immune 02/12/2016    GrBStrep, External negative 12/11/2017    HBsAg, External negative 02/12/2016    HIV, External non-reactive 02/12/2016    RPR, External non reactive 09/17/2012    Gonorrhea, External negative 02/12/2016    Chlamydia, External negative 02/12/2016        Assessment/Plan:     Plan: Admit for induction of labor. See prenatal labs.     Signed By:  Alee Sargent MD     October 16, 2020

## 2020-10-16 NOTE — PROGRESS NOTES
Bedside and Verbal shift change report given to Tonia Cantrell RN (oncoming nurse) by Ida Austin RN and Summer Braxton RN (offgoing nurse). Report included the following information SBAR.     0720: This RN and Kaykay Sat at bedside to assess patient. Patient consenting to Student RN observation. Patient resting in position of comfort in locked and lowered bed. Daren Villatoro, spouse, sitting at bedside. Patient denies current pain. Patient denies desire for epidural during labor. Patient reports hx of only one epidural, 7 natural deliveries. Patient denies hx of PPH. Patient and spouse oriented to room, call bell use, thermostat use, and emergency call bell use. Bed remains locked and lowered, call bell within reach. This RN increasing room temperature per patient request. This RN to provide patient with ice water. This RN encouraging PO hydration due to high FHR baseline. Patient reports drinking mostly soda yesterday. 2904: SVE performed by Paul Gomez MD: 3/50/-3. Patient tolerated well. AROM performed by MD: clear, large. Patient tolerated well. Bed pad and chux changed. 0962: Patient repositioned onto lateral left using pillows, HOB self regulated. 6654: Elier Student RN disconnecting patient from [facility]. Patient ambulatory to restroom with steady gait. 4389: Urine occurrence. Patient ambulatory back to bed with steady gait. Bed pad and chux changed. Patient repositioned into semi fowlers with left hip tilt using pillow. 1007: Patient repositioned into semi fowlers with right hip tilt using pillow. HOB self regulated. Patient continues to watch TV, spouse remains at bedside. Call bell within reach. 1057: Pt repositioned into high fowlers with additional pillow placed behind lower back. Patient reluctant to turn on side at this time. Will continue to assess FHR tracing. 1127: SVE performed by this RN due to patient request and patient reported pressure: 3cm. Patient poorly tolerated. DIfficult to perform complete SVE assessment. This RN concerned about vertex position due to how high FHR is tracing and this RN unable to fetal fetal head during SVE. 1141: This RN speaking with Cj De La Torre MD informing MD of recent RN performed SVE (3cm) and asking MD to perform bedside US due to FHR tracing higher via US and different shape of abdomen. MD to come to bedside to perform US, no additional orders received. 1214: This RN and [address] [place] [address] RN at bedside. Patient reports \"pushing with every contraction. \" This RN educating patient not to push since she is not completed dilated yet. This RN educating patient on risks associated with pushing prior to cervical dilation completion. Pt verbalizing understanding. This RN offering to assist patient with position change. Patient refusing lateral position change. FOB remains at bedside. 1219: This RN continuously at bedside. Chux changed, disposable underwear applied. Gripper socks applied. Patient assisted into standing position at bedside per request. Bed raised so that patient may use it to lean on if desired. FOB agreeing to stay at bedside. 1227: This RN and Elier Student RN continuously at bedside. TOCO adjusted. 1236Rand Praveen RN at bedside to [address] Patient continues to stand at bedside. FOB at bedside. 1250: SVE performed by Lor Cullen RN: 8cm/0.     0138: Cj De La Torre MD informed of SVE findings by Mane Dalal. MD asked to come to unit for upcoming delivery. Patient using effective breathing technique to cope with intermittent, abdominal contractions. This RN, Lor Cullen RN, and Elier Yeboah RN continuously at bedside. 1300: Cj De La Torre MD at bedside. 1304: L&S, Cj De La Torre MD at patient perineum. This RN, [address] Avril Powell Student RN, Lor Cullen RN, Jessica Robison RN. 1308: Patient to begin pushing per MD order. Patient re-instructed on pushing by this RN and MD.    6013:  of viable, infant boy. Infant immediately placed on maternal abdomen, dried and stimulated.  Infant urine occurrence. 1313: VORB from Sarina Freeman MD to begin PPP titration at 999ml/hr. This RN educating patient and FOB on PPP titration. 1315: Placenta expression. 1317: No tears per Sarina Freeman MD,  ml. First RN performed fundal, firm +1 midline. MD leaving the bedside      1320: Yanira care performed. Hemorrhoids noted. Patient able to lift hips to assist with bed pad, chux, and ice pack application. 1710: When transferring patient from L&D to MIU, infant noted to be having an apneic episode. Patient transported via wheelchair back onto L&D to room 205. NICU MD to update patient on condition of infant. 1800: Patient reports placing meal order. Patient remains on L&D. Infant remains in Nursery at this time. 1845: Completed meal tray removed from bedside. Patient resting in position of comfort in locked and lowered bed. FOB sitting at bedside on couch. Call bell within reach. Patient recently updated on infant condition by NICU MD.    0619: Bedside and Verbal shift change report given to Calixto Montejo RN (oncoming nurse) by Dax Alexander RN (offgoing nurse). Report included the following information SBAR, Procedure Summary, Intake/Output, MAR and Recent Results.

## 2020-10-17 PROCEDURE — 65270000029 HC RM PRIVATE

## 2020-10-17 PROCEDURE — 74011250637 HC RX REV CODE- 250/637: Performed by: OBSTETRICS & GYNECOLOGY

## 2020-10-17 RX ADMIN — IBUPROFEN 800 MG: 800 TABLET ORAL at 10:59

## 2020-10-17 RX ADMIN — IBUPROFEN 800 MG: 800 TABLET ORAL at 17:25

## 2020-10-17 RX ADMIN — IBUPROFEN 800 MG: 800 TABLET ORAL at 05:39

## 2020-10-17 RX ADMIN — IBUPROFEN 800 MG: 800 TABLET ORAL at 23:30

## 2020-10-17 RX ADMIN — LEVOTHYROXINE SODIUM 125 MCG: 0.03 TABLET ORAL at 07:07

## 2020-10-17 NOTE — ROUTINE PROCESS
Bedside and Verbal shift change report given to ernie velazquez rn (oncoming nurse) by tawana mcmillan rn (offgoing nurse). Report included the following information SBAR, Kardex, Procedure Summary, Intake/Output, MAR, Accordion and Recent Results.

## 2020-10-17 NOTE — PROGRESS NOTES
Post-Partum Day Number 1 Progress Note    Honor Riri       Information for the patient's :  Aleyda Curry, Male Vernal Rome [[id number]]   Vaginal, Spontaneous    Patient doing well without significant complaint. Voiding without difficulty, normal lochia. Vitals:  Visit Vitals  /66 (BP 1 Location: Right arm, BP Patient Position: At rest)   Pulse 77   Temp 98.1 °F (36.7 °C)   Resp 16   Ht 5' (1.524 m)   Wt 141 lb 5.4 oz (64.1 kg)   LMP 2020 (Exact Date)   SpO2 100%   Breastfeeding Unknown   BMI 27.60 kg/m²     Temp (24hrs), Av.3 °F (36.8 °C), Min:98.1 °F (36.7 °C), Max:98.6 °F (37 °C)        Exam:   Patient without distress. Abdomen soft, fundus firm, nontender                Perineum with normal lochia noted. Lower extremities are negative for swelling, cords or tenderness.     Labs:     Lab Results   Component Value Date/Time    WBC 6.5 10/16/2020 07:16 AM    WBC 6.5 10/05/2020 12:08 PM    WBC 6.5 2020 10:22 AM    WBC 7.0 2020 10:28 AM    WBC 9.4 2019 03:05 PM    WBC 9.3 2019 09:31 AM    WBC 12.5 (H) 2018 03:47 AM    WBC 11.1 (H) 10/23/2017 11:05 AM    WBC 8.9 2017 10:47 AM    WBC 9.0 2016 08:47 AM    WBC 8.4 2016 02:47 PM    WBC 7.7 2016 11:05 AM    WBC 9.7 2016 11:32 AM    WBC 6.9 04/10/2014 08:45 AM    WBC 6.5 2014 12:09 PM    WBC 7.6 11/15/2013 12:25 PM    WBC 6.2 2012 06:40 AM    WBC 7.6 2011 12:00 PM    HGB 10.2 (L) 10/16/2020 07:16 AM    HGB 9.7 (L) 10/05/2020 12:08 PM    HGB 11.0 (L) 2020 10:22 AM    HGB 13.4 2020 10:28 AM    HGB 9.7 (L) 2019 03:05 PM    HGB 9.7 (L) 2019 09:31 AM    HGB 9.7 (L) 2018 03:47 AM    HGB 9.5 (L) 10/23/2017 11:05 AM    HGB 12.6 2017 10:47 AM    HGB 9.8 (L) 2016 08:47 AM    HGB 9.6 (L) 2016 02:47 PM    HGB 10.4 (L) 2016 11:32 AM    HGB 12.1 2016 11:05 AM    HGB 12.0 2016 11:32 AM    HGB 9.4 (L) 04/11/2014 03:50 AM    HGB 10.4 (L) 04/10/2014 08:45 AM    HGB 11.4 01/24/2014 12:09 PM    HGB 12.1 11/15/2013 12:25 PM    HGB 9.9 (L) 12/06/2012 06:40 AM    HGB 8.4 (L) 04/27/2011 12:00 PM    HCT 32.8 (L) 10/16/2020 07:16 AM    HCT 30.4 (L) 10/05/2020 12:08 PM    HCT 34.0 07/27/2020 10:22 AM    HCT 38.9 03/30/2020 10:28 AM    HCT 31.7 (L) 06/17/2019 03:05 PM    HCT 31.3 (L) 04/29/2019 09:31 AM    HCT 30.3 (L) 01/14/2018 03:47 AM    HCT 29.1 (L) 10/23/2017 11:05 AM    HCT 38.9 06/01/2017 10:47 AM    HCT 32.2 (L) 08/19/2016 08:47 AM    HCT 30.1 (L) 07/29/2016 02:47 PM    HCT 31.6 (L) 05/06/2016 11:32 AM    HCT 38.1 01/29/2016 11:05 AM    HCT 36.5 01/12/2016 11:32 AM    HCT 30.0 (L) 04/11/2014 03:50 AM    HCT 32.0 (L) 04/10/2014 08:45 AM    HCT 34.4 01/24/2014 12:09 PM    HCT 37.0 11/15/2013 12:25 PM    HCT 31.5 (L) 12/06/2012 06:40 AM    HCT 27.9 (L) 04/27/2011 12:00 PM    PLATELET 065 88/66/3210 07:16 AM    PLATELET 657 39/08/9308 12:08 PM    PLATELET 469 11/99/5040 10:22 AM    PLATELET 128 12/63/1932 10:28 AM    PLATELET 142 46/13/7142 03:05 PM    PLATELET 893 69/25/0484 09:31 AM    PLATELET 346 82/91/9866 03:47 AM    PLATELET 525 99/21/9688 11:05 AM    PLATELET 444 68/37/0121 10:47 AM    PLATELET 873 86/76/1532 08:47 AM    PLATELET 710 62/99/3357 02:47 PM    PLATELET 476 70/21/5935 11:32 AM    PLATELET 066 06/85/5061 11:05 AM    PLATELET 250 80/85/8337 11:32 AM    PLATELET 996 33/48/0030 08:45 AM    PLATELET 734 43/65/6865 12:09 PM    PLATELET 452 94/71/9675 12:25 PM    PLATELET 457 59/70/8549 06:40 AM    PLATELET 976 66/76/5318 12:00 PM    Hgb, External 11.4 01/24/2014    Hgb, External 12.1  g/dL  -  WNL 11/19/2013    Hct, External 34.4 01/24/2014    Hct, External 37.0  %  -  WNL 11/19/2013    Platelet cnt., External 226 01/24/2014    Platelet cnt., External 261  x10E3/uL  -  [facility] 11/19/2013       No results found for this or any previous visit (from the past 24 hour(s)).     Assessment: Doing well, post partum day 1    Plan:  1. Continue routine postpartum and perineal care as well as maternal education.

## 2020-10-17 NOTE — LACTATION NOTE
This note was copied from a baby's chart. Reviewed breastfeeding basics:  Supply and demand,  stomach size, early  Feeding cues, skin to skin, positioning and baby led latch-on,  latched with signs of good, deep latch vs shallow, feeding frequency and duration, and log sheet for tracking infant feedings and output. Breastfeeding Booklet and Warm line information given. Discussed typical  weight loss and the importance of infant weight checks with pediatrician 1-2 post discharge. Hand Expression Education:  Mom comfortable with  manually hand expressing her colostrum. Emphasized the importance of providing infant with valuable colostrum as infant rests skin to skin at breast.  Aware to avoid extended periods of non-feeding. Aware to offer 10-20+ drops of colostrum every 2-3 hours until infant is latching and nursing effectively. Taught the rationale behind this low tech but highly effective evidence based practice. Discussed with mother her plan for feeding. Reviewed the benefits of exclusive breast milk feeding during the hospital stay. Informed her of the risks of using formula to supplement in the first few days of life as well as the benefits of successful breast milk feeding; referred her to the Breastfeeding booklet about this information. She acknowledges understanding of information reviewed and states that it is her plan to both breast and formula feed her infant. Will support her choice and offer additional information as needed. Pt will successfully establish breastfeeding by feeding in response to early feeding cues   or wake every 3h, will obtain deep latch, and will keep log of feedings/output. Taught to BF at hunger cues and or q 2-3 hrs and to offer 10-20 drops of hand expressed colostrum at any non-feeds. Baby having trouble staying latched. Mom giving both breast and formula.   Baby latches well, but then marly and pushes away from breast.  Shown mom how to put some formula in curved tip syringe, never pushing milk into mouth, but letting baby suck from curved tip syring at breast.  Baby began suckling and stayed latched after finishing formula in syringe. Encouraged more breast.  Discussed importance of colostrum and supply and demand. Breast Assessment  Left Breast: Medium  Left Nipple: Everted, Intact  Right Breast: Medium  Right Nipple: Everted  Breast- Feeding Assessment  Attends Breast-Feeding Classes: No  Breast-Feeding Experience: Yes(about 3 months with several of her other children)  Type/Quality: [address] [place] [address]  Lactation Consultant Visits  Breast-Feedings: Fair  Mother/Infant Observation  Mother Observation: Alignment, Breast comfortable, Close hold, Holds breast  Infant Observation: Latches nipple and aereolae, Lips flanged, lower, Lips flanged, upper, Opens mouth(after several attempts.)  LATCH Documentation  Latch: Repeated attempts, hold nipple in mouth, stimulate to suck  Audible Swallowing: A few with stimulation  Type of Nipple: Everted (after stimulation)  Comfort (Breast/Nipple): Soft/non-tender  Hold (Positioning): Full assist, teach one side, mother does other, staff holds  LATCH Score: 7    Mom changed her mind from yesterday and thought she would nurse . States it is hard to keep her milk supply up with watching all her children ,9 , and gives formula too.

## 2020-10-17 NOTE — ROUTINE PROCESS
Bedside and Verbal shift change report given to LOLLY Tejada RN (oncoming nurse) by Pratima Zuniga RN (offgoing nurse). Report included the following information SBAR, Kardex, Intake/Output and MAR.

## 2020-10-18 VITALS
TEMPERATURE: 97.6 F | HEIGHT: 60 IN | WEIGHT: 141.34 LBS | HEART RATE: 71 BPM | DIASTOLIC BLOOD PRESSURE: 73 MMHG | BODY MASS INDEX: 27.75 KG/M2 | SYSTOLIC BLOOD PRESSURE: 148 MMHG | OXYGEN SATURATION: 100 % | RESPIRATION RATE: 16 BRPM

## 2020-10-18 PROCEDURE — 90471 IMMUNIZATION ADMIN: CPT

## 2020-10-18 PROCEDURE — 74011250636 HC RX REV CODE- 250/636: Performed by: OBSTETRICS & GYNECOLOGY

## 2020-10-18 PROCEDURE — 90686 IIV4 VACC NO PRSV 0.5 ML IM: CPT | Performed by: OBSTETRICS & GYNECOLOGY

## 2020-10-18 PROCEDURE — 74011250637 HC RX REV CODE- 250/637: Performed by: OBSTETRICS & GYNECOLOGY

## 2020-10-18 RX ADMIN — LEVOTHYROXINE SODIUM 125 MCG: 0.03 TABLET ORAL at 07:25

## 2020-10-18 RX ADMIN — INFLUENZA VIRUS VACCINE 0.5 ML: 15; 15; 15; 15 SUSPENSION INTRAMUSCULAR at 09:57

## 2020-10-18 RX ADMIN — IBUPROFEN 800 MG: 800 TABLET ORAL at 12:20

## 2020-10-18 RX ADMIN — IBUPROFEN 800 MG: 800 TABLET ORAL at 05:39

## 2020-10-18 NOTE — PROGRESS NOTES
Post-Partum Day Number 2 Progress Note    Emry      Information for the patient's :  David Saleh, Male Clemente Verma [[id number]]   Vaginal, Spontaneous    Patient doing well without significant complaint. Voiding without difficulty, normal lochia. Vitals:  Visit Vitals  /69 (BP 1 Location: Left arm, BP Patient Position: At rest)   Pulse 76   Temp 98.6 °F (37 °C)   Resp 16   Ht 5' (1.524 m)   Wt 141 lb 5.4 oz (64.1 kg)   LMP 2020 (Exact Date)   SpO2 100%   Breastfeeding Unknown   BMI 27.60 kg/m²     Temp (24hrs), Av.1 °F (36.7 °C), Min:97.5 °F (36.4 °C), Max:98.6 °F (37 °C)      Exam:         Patient without distress. Abdomen soft, fundus firm, nontender                 ower extremities are negative for swelling, cords or tenderness.     Labs:     Lab Results   Component Value Date/Time    WBC 6.5 10/16/2020 07:16 AM    WBC 6.5 10/05/2020 12:08 PM    WBC 6.5 2020 10:22 AM    WBC 7.0 2020 10:28 AM    WBC 9.4 2019 03:05 PM    WBC 9.3 2019 09:31 AM    WBC 12.5 (H) 2018 03:47 AM    WBC 11.1 (H) 10/23/2017 11:05 AM    WBC 8.9 2017 10:47 AM    WBC 9.0 2016 08:47 AM    WBC 8.4 2016 02:47 PM    WBC 7.7 2016 11:05 AM    WBC 9.7 2016 11:32 AM    WBC 6.9 04/10/2014 08:45 AM    WBC 6.5 2014 12:09 PM    WBC 7.6 11/15/2013 12:25 PM    WBC 6.2 2012 06:40 AM    WBC 7.6 2011 12:00 PM    HGB 10.2 (L) 10/16/2020 07:16 AM    HGB 9.7 (L) 10/05/2020 12:08 PM    HGB 11.0 (L) 2020 10:22 AM    HGB 13.4 2020 10:28 AM    HGB 9.7 (L) 2019 03:05 PM    HGB 9.7 (L) 2019 09:31 AM    HGB 9.7 (L) 2018 03:47 AM    HGB 9.5 (L) 10/23/2017 11:05 AM    HGB 12.6 2017 10:47 AM    HGB 9.8 (L) 2016 08:47 AM    HGB 9.6 (L) 2016 02:47 PM    HGB 10.4 (L) 2016 11:32 AM    HGB 12.1 2016 11:05 AM    HGB 12.0 2016 11:32 AM    HGB 9.4 (L) 2014 03:50 AM    HGB 10.4 (L) 04/10/2014 08:45 AM    HGB 11.4 01/24/2014 12:09 PM    HGB 12.1 11/15/2013 12:25 PM    HGB 9.9 (L) 12/06/2012 06:40 AM    HGB 8.4 (L) 04/27/2011 12:00 PM    HCT 32.8 (L) 10/16/2020 07:16 AM    HCT 30.4 (L) 10/05/2020 12:08 PM    HCT 34.0 07/27/2020 10:22 AM    HCT 38.9 03/30/2020 10:28 AM    HCT 31.7 (L) 06/17/2019 03:05 PM    HCT 31.3 (L) 04/29/2019 09:31 AM    HCT 30.3 (L) 01/14/2018 03:47 AM    HCT 29.1 (L) 10/23/2017 11:05 AM    HCT 38.9 06/01/2017 10:47 AM    HCT 32.2 (L) 08/19/2016 08:47 AM    HCT 30.1 (L) 07/29/2016 02:47 PM    HCT 31.6 (L) 05/06/2016 11:32 AM    HCT 38.1 01/29/2016 11:05 AM    HCT 36.5 01/12/2016 11:32 AM    HCT 30.0 (L) 04/11/2014 03:50 AM    HCT 32.0 (L) 04/10/2014 08:45 AM    HCT 34.4 01/24/2014 12:09 PM    HCT 37.0 11/15/2013 12:25 PM    HCT 31.5 (L) 12/06/2012 06:40 AM    HCT 27.9 (L) 04/27/2011 12:00 PM    PLATELET 201 54/35/9510 07:16 AM    PLATELET 449 20/71/8712 12:08 PM    PLATELET 927 05/25/9957 10:22 AM    PLATELET 831 41/22/6605 10:28 AM    PLATELET 804 08/30/4001 03:05 PM    PLATELET 357 43/33/8940 09:31 AM    PLATELET 761 45/13/5997 03:47 AM    PLATELET 829 40/28/7922 11:05 AM    PLATELET 482 72/19/9567 10:47 AM    PLATELET 547 75/99/4185 08:47 AM    PLATELET 624 36/99/2379 02:47 PM    PLATELET 933 02/44/4985 11:32 AM    PLATELET 444 99/18/5472 11:05 AM    PLATELET 488 58/07/1501 11:32 AM    PLATELET 730 93/30/5572 08:45 AM    PLATELET 902 96/62/4360 12:09 PM    PLATELET 297 82/97/5571 12:25 PM    PLATELET 057 69/88/4623 06:40 AM    PLATELET 870 29/75/9950 12:00 PM    Hgb, External 11.4 01/24/2014    Hgb, External 12.1  g/dL  -  WNL 11/19/2013    Hct, External 34.4 01/24/2014    Hct, External 37.0  %  -  WNL 11/19/2013    Platelet cnt., External 226 01/24/2014    Platelet cnt., External 261  x10E3/uL  -  [facility] 11/19/2013       No results found for this or any previous visit (from the past 24 hour(s)). Assessment: Doing well, post partum day 2    Plan:   1. Discharge home today  2.  Follow up in office in 6 weeks with Daphney Alejandro MD  3. Post partum activity advised, diet as tolerated  4.  Discharge Medications: ibuprofen,  and medications prior to admission

## 2020-10-18 NOTE — PROGRESS NOTES
10/18/2020  3:55 PM    CM met with MOB to complete initial assessment and begin discharge planning. MOB verified and confirmed demographics. MOB lives with spouse/FOB- Muna Ladd ([phone]), along with their 8 children ages: 1-16, at the address on file. MOB is not employed and plans to be at home with children. FOB is employed and will be taking 12wks off. MOB reports lack of family support, stated her and her  \"handle it all\". MOB plans to formula feed baby. MOB plans to follow with [facility], for pediatric care. MOB has car seat, bassinet/crib, clothing, bottles and all necessary supplies for baby. MOB has [employer] and [employer], and will be adding baby to [employer]  policy. CM discussed process to add baby to insurance, MOB verbalized understanding. MOB has also been receiving WIC and SNAP benefits and understands how to add baby to these programs. Pt's spouse will provide transportation at discharge. Care Management Interventions  PCP Verified by CM: Yes([facility])  Mode of Transport at Discharge:  Other (see comment)  Transition of Care Consult (CM Consult): Discharge Planning  Current Support Network: Own Home, Lives with Spouse  Confirm Follow Up Transport: Family  Discharge Location  Discharge Placement: Home with family assistance  Romelia Denny

## 2020-10-18 NOTE — ROUTINE PROCESS
Bedside and Verbal shift change report given to LOLLY Tejada RN (oncoming nurse) by Jeffery Lora RN (offgoing nurse). Report included the following information SBAR, Kardex, Intake/Output and MAR.

## 2020-12-08 ENCOUNTER — OFFICE VISIT (OUTPATIENT)
Dept: OBGYN CLINIC | Age: 40
End: 2020-12-08
Payer: MEDICAID

## 2020-12-08 VITALS
WEIGHT: 122 LBS | HEIGHT: 60 IN | SYSTOLIC BLOOD PRESSURE: 108 MMHG | BODY MASS INDEX: 23.95 KG/M2 | DIASTOLIC BLOOD PRESSURE: 60 MMHG

## 2020-12-08 PROCEDURE — 0503F POSTPARTUM CARE VISIT: CPT | Performed by: OBSTETRICS & GYNECOLOGY

## 2020-12-08 NOTE — PROGRESS NOTES
Postpartum evaluation    Iris Barnes is a 45 y.o. female who presents for a postpartum exam.     She is now six weeks post normal spontaneous vaginal delivery. Her baby is doing well. She has had no menses since delivery. She has had the following significant problems since her delivery: none    The patient is formula feeding without difficulty. The patient would like to use nothing for birth control. She is currently taking: no medications. She is due for her next AE in 12 months.      Visit Vitals  /60   Ht 5' (1.524 m)   Wt 122 lb (55.3 kg)   LMP 12/02/2020 (Exact Date)   BMI 23.83 kg/m²       PHYSICAL EXAMINATION    Constitutional  · Appearance: well-nourished, well developed, alert, in no acute distress    HENT  · Head and Face: appears normal    Neck  · Inspection/Palpation: normal appearance, no masses or tenderness  · Lymph Nodes: no lymphadenopathy present  · Thyroid: gland size normal, nontender, no nodules or masses present on palpation    Gastrointestinal  · Abdominal Examination: abdomen non-tender to palpation, normal bowel sounds, no masses present  · Liver and spleen: no hepatomegaly present, spleen not palpable  · Hernias: no hernias identified    Genitourinary  · External Genitalia: normal appearance for age, no discharge present, no tenderness present, no inflammatory lesions present, no masses present, no atrophy present  · Vagina: normal vaginal vault without central or paravaginal defects, no discharge present, no inflammatory lesions present, no masses present  · Bladder: non-tender to palpation  · Urethra: appears normal  · Cervix: normal   · Uterus: normal size, shape and consistency  · Adnexa: no adnexal tenderness present, no adnexal masses present  · Perineum: perineum within normal limits, no evidence of trauma, no rashes or skin lesions present  · Anus: anus within normal limits, no hemorrhoids present  · Inguinal Lymph Nodes: no lymphadenopathy present    Skin  · General Inspection: no rash, no lesions identified    Neurologic/Psychiatric  · Mental Status:  · Orientation: grossly oriented to person, place and time  · Mood and Affect: mood normal, affect appropriate    Assessment:  Normal postpartum check    Plan:  RTO for AE.   Rx for contraception: none

## 2020-12-08 NOTE — LACTATION NOTE
This note was copied from a baby's chart. Pt chooses to do both breast and bottle. Discussed effects of early supplementation on breastfeeding success; may decrease breastmilk production and supply, increase risk for pathological engorgement, baby may develop preference for faster flow from bottles vs breast, and baby's stomach can be stretched if larger volumes of formula are given. Breast Assessment  Left Breast: Medium  Left Nipple: Everted, Intact  Right Breast: Medium  Right Nipple: Everted, Intact  Breast- Feeding Assessment  Attends Breast-Feeding Classes: No (This is pt's 8th child, family feeding goals is to blend formula feeds with some breast feeds)  Breast-Feeding Experience: Yes  Breast Trauma/Surgery: No  Type/Quality: Good  Lactation Consultant Visits  Breast-Feedings: Good   Mother/Infant Observation  Mother Observation:  (Infant fed formula at recent feedings, mom indicates she is comfortable with hr feeding method)  Infant Observation: Opens mouth, Feeding cues  LATCH Documentation  Latch:  (LC did not observe infant at the breast due to recent formula feeds)  Audible Swallowing: A few with stimulation  Type of Nipple: Everted (after stimulation)  Comfort (Breast/Nipple): Soft/non-tender  Hold (Positioning): No assist from staff, mother able to position/hold infant  LATCH Score: 8  Discharge: Family offering formula feedings with occassional feeding at breast.  Infant's voids and stools are appropriate for age. Mom verbalizes an appropriate feeding plan of care for her infant. Aware to follow up with pediatrician within 1-2 days of discharge for pediatric assessment and review. Encouraged to call warm line number for any questions/concerns that may arise. Complex Repair And Double M Plasty Text: The defect edges were debeveled with a #15 scalpel blade.  The primary defect was closed partially with a complex linear closure.  Given the location of the remaining defect, shape of the defect and the proximity to free margins a double M plasty was deemed most appropriate for complete closure of the defect.  Using a sterile surgical marker, an appropriate advancement flap was drawn incorporating the defect and placing the expected incisions within the relaxed skin tension lines where possible.    The area thus outlined was incised deep to adipose tissue with a #15 scalpel blade.  The skin margins were undermined to an appropriate distance in all directions utilizing iris scissors.

## 2021-03-22 ENCOUNTER — OFFICE VISIT (OUTPATIENT)
Dept: ENDOCRINOLOGY | Age: 41
End: 2021-03-22
Payer: COMMERCIAL

## 2021-03-22 VITALS
SYSTOLIC BLOOD PRESSURE: 131 MMHG | RESPIRATION RATE: 16 BRPM | BODY MASS INDEX: 24.35 KG/M2 | DIASTOLIC BLOOD PRESSURE: 63 MMHG | HEIGHT: 60 IN | HEART RATE: 86 BPM | OXYGEN SATURATION: 99 % | TEMPERATURE: 98 F | WEIGHT: 124 LBS

## 2021-03-22 DIAGNOSIS — E03.9 ACQUIRED HYPOTHYROIDISM: Primary | ICD-10-CM

## 2021-03-22 DIAGNOSIS — D35.1 PARATHYROID ADENOMA: ICD-10-CM

## 2021-03-22 PROCEDURE — 99214 OFFICE O/P EST MOD 30 MIN: CPT | Performed by: INTERNAL MEDICINE

## 2021-03-22 NOTE — PROGRESS NOTES
Mel Cruz is a 45 y.o. female here for   Chief Complaint   Patient presents with    Thyroid Problem       1. Have you been to the ER, urgent care clinic since your last visit? Hospitalized since your last visit? -no    2. Have you seen or consulted any other health care providers outside of the [address] [place] [address] since your last visit?   Include any pap smears or colon screening.-no

## 2021-03-22 NOTE — PROGRESS NOTES
Jossy Martinez MD        Patient Information  Date:3/22/2021  Name : Mian Lynn 45 y.o.     YOB: 1980             Chief Complaint   Patient presents with    Thyroid Problem       History of present illness        Mian Lynn is a 45 y.o. female   She reestablished care in August 2019  She was seen in 2013 for primary hypothyroidism, she was pregnant then. She has long-standing history of hypothyroidism, was not compliant with the levothyroxine, levels have been fluctuating widely, highest TSH was 166,. Had ultrasound thyroid in May 2019 which showed diffuse goiter with possible right parathyroid adenoma. Calcium levels were normal.  No hypercalcemia, kidney stones, family history of hypercalcemia or kidney stones    She delivered a healthy male baby in 2020, not breast-feeding  Taking levothyroxine consistently    She has lost weight      Prior history  Recent TSH is 99, she has stopped taking levothyroxine due to side effects. Reports chest pain whenever she takes levothyroxine, not really tremors, nervousness, jitteriness. Feels better when she stops. Thinks that the dose is higher. Last year when she took it consistently 100 mcg TSH was normal  Did not have menstrual cycles for 3 months, found to be pregnant, 12 weeks now    No weight gain, fatigue, constipation, dry skin  No chest pain or shortness of breath    Past Medical History:   Diagnosis Date    Acquired hypothyroidism     hypothyroidism - takes 75 mcg synthroid    Anemia     Anemia affecting pregnancy in third trimester     Depression     pt reports to RN \"when I was in my teens\"    Tobacco abuse        Current Outpatient Medications   Medication Sig    Unithroid 125 mcg tablet Take 1 Tab by mouth Daily (before breakfast). Brand Medically Necessary. Stop 100 mcg.  ibuprofen (MOTRIN) 800 mg tablet Take 1 Tab by mouth every eight (8) hours as needed for Pain.     PNV no. [id number] (Prenatal Gummies) 400 mcg-35 mg- 25 mg-5 mg chew Take  by mouth. Pt reports eating 6-7 gummies nightly    ferrous sulfate 325 mg (65 mg iron) tablet Take 325 mg by mouth Daily (before breakfast).  ascorbic acid, vitamin C, (VITAMIN C) 250 mg tablet Take 250 mg by mouth as needed. No current facility-administered medications for this visit. Review of Systems:  - Constitutional Symptoms: no fevers, chills,  - Eyes: no blurry vision or double vision  - Cardiovascular: No chest pain or palpitations  - Respiratory: no cough or shortness of breath  - Gastrointestinal: no dysphagia or abdominal pain  - Musculoskeletal: no joint pains   -     Physical Examination:  - Blood pressure 131/63, pulse 86, temperature 98 °F (36.7 °C), temperature source Oral, resp. rate 16, height 5' (1.524 m), weight 124 lb (56.2 kg), SpO2 99 %, unknown if currently breastfeeding. Body mass index is 24.22 kg/m². - General: pleasant, no distress, good eye contact  - HEENT: no redness, no periorbital edema, EOMI  - Neck: No thyromegaly  - CVS: S1-S2 regular  - RS: Normal respiratory effort  - Musculoskeletal: no tremors  - Neurological: alert and oriented  - Psychiatric: normal mood and affect  - Skin: color,  normal.    Data Reviewed:       [x] Reviewed labs    Assessment/Plan:     Primary hypothyroidism:   Levothyroxine  Labs today      Possible right parathyroid adenoma: Reported on the ultrasound  Her calcium has been normal  No kidney stones  Recheck calcium              Thank you for allowing me to participate in the care of this patient.     Esthela Peraza MD      Patient verbalized understanding

## 2021-03-22 NOTE — LETTER
3/22/2021 Patient: Baldemar Blizzard YOB: 1980 Date of Visit: 3/22/2021 Jeanine Avilez [address] [facility] [phone] [address] In [employer] Dear Zahra Troncoso MD, Thank you for referring Ms. Baldemar Blizzard to [address] [place] [address] for evaluation. My notes for this consultation are attached. If you have questions, please do not hesitate to call me. I look forward to following your patient along with you. Sincerely, Andrew Duong MD

## 2021-03-23 LAB
BUN SERPL-MCNC: 4 MG/DL (ref 6–20)
BUN/CREAT SERPL: 7 (ref 9–23)
CALCIUM SERPL-MCNC: 9.3 MG/DL (ref 8.7–10.2)
CHLORIDE SERPL-SCNC: 105 MMOL/L (ref 96–106)
CO2 SERPL-SCNC: 25 MMOL/L (ref 20–29)
CREAT SERPL-MCNC: 0.58 MG/DL (ref 0.57–1)
GLUCOSE SERPL-MCNC: 82 MG/DL (ref 65–99)
POTASSIUM SERPL-SCNC: 5 MMOL/L (ref 3.5–5.2)
SODIUM SERPL-SCNC: 142 MMOL/L (ref 134–144)
T4 FREE SERPL-MCNC: 1.46 NG/DL (ref 0.82–1.77)
TSH SERPL DL<=0.005 MIU/L-ACNC: 0.05 UIU/ML (ref 0.45–4.5)

## 2021-03-25 ENCOUNTER — TELEPHONE (OUTPATIENT)
Dept: ENDOCRINOLOGY | Age: 41
End: 2021-03-25

## 2021-03-25 DIAGNOSIS — E03.9 ACQUIRED HYPOTHYROIDISM: Primary | ICD-10-CM

## 2021-03-25 RX ORDER — LEVOTHYROXINE SODIUM 100 UG/1
100 TABLET ORAL
Qty: 90 TAB | Refills: 3 | Status: SHIPPED | OUTPATIENT
Start: 2021-03-25 | End: 2021-10-25 | Stop reason: ALTCHOICE

## 2021-03-25 NOTE — TELEPHONE ENCOUNTER
Per Dr. Magalys Quan, informed pt of result note, as noted above. Pt verbalized understanding with no further questions or concerns at this time.

## 2021-03-25 NOTE — TELEPHONE ENCOUNTER
----- Message from Nahum Delvalle MD sent at 3/24/2021  4:59 PM EDT -----  Unithroid 100 mcg in the morning, discontinue 125 mcg

## 2021-10-19 LAB
T4 FREE SERPL-MCNC: 1.63 NG/DL (ref 0.82–1.77)
TSH SERPL DL<=0.005 MIU/L-ACNC: 0.39 UIU/ML (ref 0.45–4.5)

## 2021-10-25 ENCOUNTER — OFFICE VISIT (OUTPATIENT)
Dept: ENDOCRINOLOGY | Age: 41
End: 2021-10-25
Payer: COMMERCIAL

## 2021-10-25 VITALS
TEMPERATURE: 98.4 F | OXYGEN SATURATION: 99 % | BODY MASS INDEX: 22.36 KG/M2 | RESPIRATION RATE: 16 BRPM | WEIGHT: 113.9 LBS | DIASTOLIC BLOOD PRESSURE: 76 MMHG | HEIGHT: 60 IN | HEART RATE: 93 BPM | SYSTOLIC BLOOD PRESSURE: 119 MMHG

## 2021-10-25 DIAGNOSIS — R43.9 DYSOSMIA: ICD-10-CM

## 2021-10-25 DIAGNOSIS — E03.9 ACQUIRED HYPOTHYROIDISM: Primary | ICD-10-CM

## 2021-10-25 DIAGNOSIS — E04.9 NODULAR GOITER: ICD-10-CM

## 2021-10-25 PROCEDURE — 99214 OFFICE O/P EST MOD 30 MIN: CPT | Performed by: INTERNAL MEDICINE

## 2021-10-25 RX ORDER — LEVOTHYROXINE SODIUM 88 UG/1
88 TABLET ORAL
Qty: 90 TABLET | Refills: 3 | Status: SHIPPED | OUTPATIENT
Start: 2021-10-25 | End: 2022-04-25 | Stop reason: SDUPTHER

## 2021-10-25 NOTE — LETTER
10/25/2021    Patient: Elmer Cardona   YOB: 1980   Date of Visit: 10/25/2021     Germain Baig, [address] [address]  Via Fax: [phone]    Dear Germain Baig MD,      Thank you for referring Ms. Elmer Cardona to [address] [place] [address] for evaluation. My notes for this consultation are attached. If you have questions, please do not hesitate to call me. I look forward to following your patient along with you.       Sincerely,    Emilia Boggs MD Colt Alvarado    No chief complaint on file. History and Physical    Colt Alvarado is a 73 y.o. female with end-stage renal disease and currently dialyzing through a right internal jugular venous catheter. No current issues at dialysis at this current time. She has no claudication of bilateral upper extremities no shortness of breath. Past Medical History:   Diagnosis Date    Asthma     [facility]     Brain condition     Cataract     Chronic kidney disease     hemodialysis M-W-F    Diabetes ([facility]     Hyperlipidemia     Hypertension     Paralytic strabismus, unspecified     Psychiatric disorder     Seizures ([facility] 08/27/2018     Past Surgical History:   Procedure Laterality Date    DC INSJ TUNNELED CVC W/O SUBQ PORT/ AGE 6 YR/> N/A 8/9/2018     in-pt 474A, Insertion Tunneled Central Venous Catheter performed by Tobin Farmer MD at [facility] CATH LAB     Patient Active Problem List   Diagnosis Code    Acute on chronic renal insufficiency N28.9, N18.9    Hypertension I10    ESRD (end stage renal disease) ([facility] N18.6    Secondary hyperparathyroidism of renal origin ([facility] N25.81    Type 2 DM with hypertension and ESRD on dialysis ([facility] E11.22, I12.0, Z99.2, N18.6    CVA, old, cognitive deficits I69.319    Bandemia D72.825    New onset seizure ([facility] R56.9     Current Outpatient Medications   Medication Sig Dispense Refill    sevelamer carbonate (RENVELA) 800 mg tab tab Take 800 mg by mouth three (3) times daily.  traMADol (ULTRAM) 50 mg tablet Take 1 Tab by mouth every six (6) hours as needed for Pain. Max Daily Amount: 200 mg. 20 Tab 0    mirtazapine (REMERON) 15 mg tablet Take  by mouth nightly.  levETIRAcetam (KEPPRA) 250 mg tablet Take 1 Tab by mouth every Monday, Wednesday, Friday. 30 Tab 1    hydrALAZINE (APRESOLINE) 50 mg tablet Take 1 Tab by mouth three (3) times daily.  90 Tab 0    insulin lispro (HUMALOG) 100 unit/mL injection INITIATE INSULIN CORRECTIVE PROTOCOL: Normal Insulin Sensitivity   For Blood Sugar (mg/dL) of:     Less than 150 =   0 units           150 -199 =   2 units  200 -249 =   4 units  250 -299 =   6 units  300 -349 =   8 units  350 and above = 10 units and Call Physician  If 2 glucose readings are above 1 Vial 0    losartan (COZAAR) 100 mg tablet Take 1 Tab by mouth daily. 30 Tab 0    aspirin 81 mg tablet Take 81 mg by mouth daily.  diltiazem CD (CARDIZEM CD) 240 mg ER capsule Take 240 mg by mouth daily.  cloNIDine (CATAPRES) 0.1 mg tablet Take 0.1 mg by mouth two (2) times a day.  simvastatin (ZOCOR) 20 mg tablet Take 1 Tab by mouth nightly. 30 Tab 0    epoetin raphael (EPOGEN;PROCRIT) 3,000 unit/mL injection 1.73 mL by SubCUTAneous route Every Tuesday, Thursday and Saturday.  Indications: ANEMIA DUE TO RENAL FAILURE, Renal Dialysis (Patient taking differently: 3,000 Units by SubCUTAneous route every Monday, Wednesday, Friday.) 1 Vial 0     Allergies   Allergen Reactions    Amoxicillin Unknown (comments)    Cleocin [Clindamycin Hcl] Unknown (comments)    Codeine Unknown (comments)    Lorcet 10/650 [Hydrocodone-Acetaminophen] Unknown (comments)    Penicillin G Benzathine Unknown (comments)    Shellfish Derived Unknown (comments)     Social History     Socioeconomic History    Marital status: SINGLE     Spouse name: Not on file    Number of children: Not on file    Years of education: Not on file    Highest education level: Not on file   Occupational History    Not on file   Social Needs    Financial resource strain: Not on file    Food insecurity:     Worry: Not on file     Inability: Not on file    Transportation needs:     Medical: Not on file     Non-medical: Not on file   Tobacco Use    Smoking status: Never Smoker    Smokeless tobacco: Never Used   Substance and Sexual Activity    Alcohol use: No    Drug use: No    Sexual activity: Never   Lifestyle    Physical activity:     Days per week: Not on file     Minutes per session: Not on file    Stress: Not on file   Relationships    Social connections:     Talks on phone: Not on file     Gets together: Not on file     Attends [employer] service: Not on file     Active member of club or organization: Not on file     Attends meetings of clubs or organizations: Not on file     Relationship status: Not on file    Intimate partner violence:     Fear of current or ex partner: Not on file     Emotionally abused: Not on file     Physically abused: Not on file     Forced sexual activity: Not on file   Other Topics Concern    Not on file   Social History Narrative    Not on file      History reviewed. No pertinent family history. Physical Exam:    Visit Vitals  /82 (BP 1 Location: Right arm, BP Patient Position: Sitting)   Pulse 81   Resp 17   Ht 5' 3\" (1.6 m)   Wt 125 lb (56.7 kg)   LMP  (LMP Unknown)   SpO2 97%   BMI 22.14 kg/m²      General: Well-appearing female no acute distress  HEENT: EOMI no scleral icterus is noted  Pulmonary: No increased work of breathing is noted  Extremities: Warm well perfused bilaterally in her upper extremities  Neuro: Cranial nerves II through XII are grossly intact    Impression and Plan:  Colt Alvarado is a 73 y.o. female with end-stage renal disease. She is currently dialyzing through a right internal jugular venous catheter. She was again offered surgical options of right upper extremity AV graft versus thigh loop graft. We also talked about peritoneal dialysis catheter. Given all of the risks and benefits of each surgery patient has decided against surgery. She has elected to continue with being catheter only. She is understanding that eventually we will lose catheter sites and that she is at increased risk of death. She is also increased risk of sepsis. But patient has thus far refused surgery. She can follow-up with me on an as-needed basis. We reviewed the plan with the patient and the patient understands. We also gave the patient appropriate instructions on their disease process and when to call back. Greater than 50% of this visit was spent with face to face discussion. Follow-up and Dispositions    · Return if symptoms worsen or fail to improve. Sayda Ellington MD    PLEASE NOTE:  This document has been produced using voice recognition software. Unrecognized errors in transcription may be present.

## 2021-10-25 NOTE — PROGRESS NOTES
Osbaldo Calderon MD        Patient Information  Date:10/25/2021  Name : Maya Reinoso 45 y.o.     YOB: 1980             Chief Complaint   Patient presents with    Thyroid Problem       History of present illness        Maya Reinoso is a 45 y.o. female   She reestablished care in August 2019  She was seen in 2013 for primary hypothyroidism, she was pregnant then. She has long-standing history of hypothyroidism, was not compliant with the levothyroxine, levels have been fluctuating widely, highest TSH was 166,. Had ultrasound thyroid in May 2019 which showed diffuse goiter with possible right parathyroid adenoma. Calcium levels were normal.  No hypercalcemia, kidney stones, family history of hypercalcemia or kidney stones    She delivered a healthy male baby in 2020,   She has lost weight      Prior history  Recent TSH is 99, she has stopped taking levothyroxine due to side effects. Reports chest pain whenever she takes levothyroxine, not really tremors, nervousness, jitteriness. Feels better when she stops. Thinks that the dose is higher. Last year when she took it consistently 100 mcg TSH was normal  Did not have menstrual cycles for 3 months, found to be pregnant, 12 weeks now    No weight gain, fatigue, constipation, dry skin  No chest pain or shortness of breath    Past Medical History:   Diagnosis Date    Acquired hypothyroidism     hypothyroidism - takes 75 mcg synthroid    Anemia     Anemia affecting pregnancy in third trimester     Depression     pt reports to RN \"when I was in my teens\"    Tobacco abuse        Current Outpatient Medications   Medication Sig    Unithroid 100 mcg tablet Take 1 Tab by mouth Daily (before breakfast). Brand Medically Necessary. Stop 125 mcg.  ascorbic acid, vitamin C, (VITAMIN C) 250 mg tablet Take 250 mg by mouth as needed.  (Patient not taking: Reported on 10/25/2021)     No current facility-administered medications for this visit. Review of Systems:  - Per HPI  -     Physical Examination:  - Blood pressure 119/76, pulse 93, temperature 98.4 °F (36.9 °C), temperature source Temporal, resp. rate 16, height 5' (1.524 m), weight 113 lb 14.4 oz (51.7 kg), SpO2 99 %, unknown if currently breastfeeding. Body mass index is 22.24 kg/m². - General: pleasant, no distress, good eye contact  - HEENT: no redness, no periorbital edema, EOMI  - Neck: No thyromegaly  - CVS: S1-S2 regular  - RS: Normal respiratory effort  - Musculoskeletal: no tremors  - Neurological: alert and oriented  - Psychiatric: normal mood and affect  - Skin: color,  normal.    Data Reviewed:       [x] Reviewed labs    Assessment/Plan:     Primary hypothyroidism:   Unithroid, adjusted the dose      Possible right parathyroid adenoma: Reported on the ultrasound  Calcium has been normal      Parosmia/ dysosmia: No allergies, no sinusitis  No Covid  If it persists ENT evaluation          Thank you for allowing me to participate in the care of this patient.     Emily Singh MD      Patient verbalized understanding

## 2021-10-25 NOTE — PROGRESS NOTES
Mian Lynn is a 45 y.o. female here for   Chief Complaint   Patient presents with    Thyroid Problem       1. Have you been to the ER, urgent care clinic since your last visit? Hospitalized since your last visit? -no    2. Have you seen or consulted any other health care providers outside of the [address] [place] [address] since your last visit?   Include any pap smears or colon screening.-no

## 2021-11-15 ENCOUNTER — OFFICE VISIT (OUTPATIENT)
Dept: ENT CLINIC | Age: 41
End: 2021-11-15
Payer: COMMERCIAL

## 2021-11-15 VITALS
DIASTOLIC BLOOD PRESSURE: 80 MMHG | TEMPERATURE: 98.2 F | RESPIRATION RATE: 18 BRPM | HEIGHT: 61 IN | WEIGHT: 113 LBS | BODY MASS INDEX: 21.34 KG/M2 | HEART RATE: 111 BPM | SYSTOLIC BLOOD PRESSURE: 120 MMHG | OXYGEN SATURATION: 99 %

## 2021-11-15 DIAGNOSIS — R43.2 DYSGEUSIA: ICD-10-CM

## 2021-11-15 DIAGNOSIS — G44.52 NEW DAILY PERSISTENT HEADACHE: ICD-10-CM

## 2021-11-15 DIAGNOSIS — R43.1 PAROSMIA: ICD-10-CM

## 2021-11-15 DIAGNOSIS — H60.8X3 CHRONIC ECZEMATOUS OTITIS EXTERNA OF BOTH EARS: ICD-10-CM

## 2021-11-15 DIAGNOSIS — J34.2 DNS (DEVIATED NASAL SEPTUM): ICD-10-CM

## 2021-11-15 DIAGNOSIS — E03.9 ACQUIRED HYPOTHYROIDISM: Primary | ICD-10-CM

## 2021-11-15 PROCEDURE — 99204 OFFICE O/P NEW MOD 45 MIN: CPT | Performed by: OTOLARYNGOLOGY

## 2021-11-15 RX ORDER — FLUOCINOLONE ACETONIDE 0.11 MG/ML
OIL AURICULAR (OTIC)
Qty: 20 ML | Refills: 3 | Status: SHIPPED | OUTPATIENT
Start: 2021-11-15

## 2021-11-15 NOTE — LETTER
11/15/2021    Patient: Eulalio Jones   YOB: 1980   Date of Visit: 11/15/2021     Falguni Esquivel, [address] [phone]  Via Fax: 15[address], [address] [facility] [address]  Via In Basket    Dear MD Selin Al MD,      Thank you for referring Ms. Eulalio Jones to [facility], [facility] for evaluation. My notes for this consultation are attached. If you have questions, please do not hesitate to call me. I look forward to following your patient along with you.       Sincerely,    Wander Gil MD

## 2021-11-15 NOTE — PROGRESS NOTES
Visit Vitals  /80 (BP 1 Location: Left upper arm, BP Patient Position: Sitting, BP Cuff Size: Adult)   Pulse (!) 111   Temp 98.2 °F (36.8 °C) (Temporal)   Resp 18   Ht 5' 1\" (1.549 m)   Wt 113 lb (51.3 kg)   SpO2 99%   BMI 21.35 kg/m²     Chief Complaint   Patient presents with    New Patient     abnormal smell and taste

## 2021-11-15 NOTE — PROGRESS NOTES
Subjective:    Adry Worrell   45 y.o.   1980     New Patient Visit    Location - nose, mouth    Quality - change in sense of smell/taste    Severity -  moderate    Duration - few months    Timing - ongoing, chronic    Context - pt states her sense of smell/taste has changed foul, like \"poopy diaper. \"  Even odors that are supposed to smell good can smell rotten; no other obvious nasal symptoms; other than dry nose; no drainage no sinus issues. Patient has not had Covid vaccine nor does she report a history of Covid infection. She feels as though the odor started after she had her ninth child and has been changing a lot of diapers. Modifying Features - as above    Associated symptoms/signs - thyroid problems, itchy ears, dry nose. Headaches      Review of Systems  Review of Systems   Constitutional: Negative for chills and fever. HENT: Negative for ear pain, hearing loss, nosebleeds and tinnitus. Eyes: Negative for blurred vision and double vision. Respiratory: Negative for cough, sputum production and shortness of breath. Cardiovascular: Negative for chest pain and palpitations. Gastrointestinal: Negative for heartburn, nausea and vomiting. Musculoskeletal: Negative for joint pain and neck pain. Skin: Positive for itching. Neurological: Positive for headaches. Negative for dizziness, speech change and weakness. Endo/Heme/Allergies: Negative for environmental allergies. Does not bruise/bleed easily. Psychiatric/Behavioral: Negative for memory loss. The patient does not have insomnia. Past Medical History:   Diagnosis Date    Acquired hypothyroidism     hypothyroidism - takes 75 mcg synthroid    Anemia     Anemia affecting pregnancy in third trimester     Depression     pt reports to RN \"when I was in my teens\"    Tobacco abuse      History reviewed. No pertinent surgical history.    Family History   Problem Relation Age of Onset    Cancer Maternal Aunt     Diabetes Maternal Uncle     Cancer Maternal Grandfather     Heart Disease Mother     Heart Disease Father      Social History     Tobacco Use    Smoking status: Former Smoker     Packs/day: 0.30     Years: 20.00     Pack years: 6.00     Types: Cigarettes     Quit date: 2020     Years since quittin.7    Smokeless tobacco: Never Used    Tobacco comment: Quit when she found out she was pregnant   Substance Use Topics    Alcohol use: No     Alcohol/week: 0.0 standard drinks      Prior to Admission medications    Medication Sig Start Date End Date Taking? Authorizing Provider   fluocinolone acetonide oiL (DermOtic Oil) 0.01 % drop 4 drops to each ear as needed for itching 11/15/21  Yes Renetta Womack MD   Unithroid 88 mcg tablet Take 1 Tablet by mouth Daily (before breakfast). Stop 100 mcg Brand medically necessary 10/25/21   Wander Monday, MD   ascorbic acid, vitamin C, (VITAMIN C) 250 mg tablet Take 250 mg by mouth as needed. Patient not taking: Reported on 10/25/2021    Provider, Historical        No Known Allergies      Objective:     Visit Vitals  /80 (BP 1 Location: Left upper arm, BP Patient Position: Sitting, BP Cuff Size: Adult)   Pulse (!) 111   Temp 98.2 °F (36.8 °C) (Temporal)   Resp 18   Ht 5' 1\" (1.549 m)   Wt 113 lb (51.3 kg)   SpO2 99%   BMI 21.35 kg/m²        Physical Exam  Vitals reviewed. Constitutional:       General: She is awake. She is not in acute distress. Appearance: Normal appearance. She is well-groomed and normal weight. HENT:      Head: Normocephalic and atraumatic. Jaw: There is normal jaw occlusion. No trismus, tenderness or malocclusion. Salivary Glands: Right salivary gland is not diffusely enlarged or tender. Left salivary gland is not diffusely enlarged or tender.       Right Ear: Hearing, tympanic membrane, ear canal and external ear normal.      Left Ear: Hearing, tympanic membrane, ear canal and external ear normal.      Ears:      Gomez exam findings: does not lateralize. Right Rinne: AC > BC. Left Rinne: AC > BC. Comments: Dry skin of ear canals     Nose: Septal deviation (Right inferior, anterior) present. No nasal deformity or mucosal edema. Right Nostril: No epistaxis. Left Nostril: No epistaxis. Right Turbinates: Not enlarged, swollen or pale. Left Turbinates: Not enlarged, swollen or pale. Right Sinus: No maxillary sinus tenderness or frontal sinus tenderness. Left Sinus: No maxillary sinus tenderness or frontal sinus tenderness. Comments: Full nasal cavity visible on anterior rhinoscopy, no polyps purulence or upper nasal vault lesion noted     Mouth/Throat:      Lips: Pink. No lesions. Mouth: Mucous membranes are moist. No oral lesions. Dentition: Normal dentition. No dental caries. Tongue: No lesions. Palate: No mass and lesions. Pharynx: Oropharynx is clear. Uvula midline. No oropharyngeal exudate or posterior oropharyngeal erythema. Tonsils: No tonsillar exudate. 0 on the right. 0 on the left. Eyes:      General: Vision grossly intact. Extraocular Movements: Extraocular movements intact. Right eye: No nystagmus. Left eye: No nystagmus. Conjunctiva/sclera: Conjunctivae normal.      Pupils: Pupils are equal, round, and reactive to light. Neck:      Thyroid: No thyroid mass, thyromegaly or thyroid tenderness. Trachea: Trachea and phonation normal. No tracheal tenderness or tracheal deviation. Cardiovascular:      Rate and Rhythm: Normal rate and regular rhythm. Pulmonary:      Effort: Pulmonary effort is normal. No respiratory distress. Breath sounds: No stridor. Musculoskeletal:         General: No swelling or tenderness. Normal range of motion. Cervical back: No edema or erythema. Lymphadenopathy:      Cervical: No cervical adenopathy. Skin:     General: Skin is warm and dry. Findings: No lesion or rash.    Neurological: General: No focal deficit present. Mental Status: She is alert and oriented to person, place, and time. Mental status is at baseline. Cranial Nerves: Cranial nerves are intact. Coordination: Romberg sign negative. Gait: Gait is intact. Psychiatric:         Mood and Affect: Mood normal.         Behavior: Behavior normal. Behavior is cooperative. Assessment/Plan:     Encounter Diagnoses   Name Primary?  Acquired hypothyroidism Yes    Parosmia     Dysgeusia     Chronic eczematous otitis externa of both ears     DNS (deviated nasal septum)     New daily persistent headache      Parosmia/dysgeusia -likely idiopathic. She is a smoker. She has had issues with thyroid function. Consider autoimmune. She has a new daily hemicrania and we can do MRI to rule out intracranial and skull base pathology. We discussed essential oil smell training therapy. Could consider additional labs in the future as well. Also recommend nasal saline and ointment for her dry nose. She has not had Covid vaccine and denies any Covid symptoms but have to consider whether this could be a possibility. At this point testing for antibodies would likely not . She has chronic otitis externa with dry skin. I'll prescribe Derm otic oil. I'll contact patient after MRI. Orders Placed This Encounter    MRI BRAIN WO CONT    fluocinolone acetonide oiL (DermOtic Oil) 0.01 % drop     Follow-up and Dispositions    · Return if symptoms worsen or fail to improve. Thank you for referring this patient,    Stanislaw Brantley MD, [address] [facility]    [address]  [facility]    [address] [address]  [place] [address]

## 2022-03-18 PROBLEM — R43.1 PAROSMIA: Status: ACTIVE | Noted: 2021-11-15

## 2022-03-19 PROBLEM — Z34.90 PREGNANCY: Status: ACTIVE | Noted: 2020-03-30

## 2022-03-19 PROBLEM — H60.8X3 CHRONIC ECZEMATOUS OTITIS EXTERNA OF BOTH EARS: Status: ACTIVE | Noted: 2021-11-15

## 2022-03-20 PROBLEM — Z34.90 PREGNANT: Status: ACTIVE | Noted: 2020-10-16

## 2022-03-20 PROBLEM — R43.2 DYSGEUSIA: Status: ACTIVE | Noted: 2021-11-15

## 2022-04-19 LAB
T3 SERPL-MCNC: 120 NG/DL (ref 71–180)
T4 FREE SERPL-MCNC: 1.55 NG/DL (ref 0.82–1.77)
TSH SERPL DL<=0.005 MIU/L-ACNC: 1.11 UIU/ML (ref 0.45–4.5)

## 2022-04-25 ENCOUNTER — OFFICE VISIT (OUTPATIENT)
Dept: ENDOCRINOLOGY | Age: 42
End: 2022-04-25
Payer: COMMERCIAL

## 2022-04-25 VITALS
OXYGEN SATURATION: 98 % | HEART RATE: 82 BPM | BODY MASS INDEX: 21.18 KG/M2 | TEMPERATURE: 97.7 F | SYSTOLIC BLOOD PRESSURE: 128 MMHG | DIASTOLIC BLOOD PRESSURE: 68 MMHG | WEIGHT: 112.2 LBS | HEIGHT: 61 IN

## 2022-04-25 DIAGNOSIS — R43.9 DYSOSMIA: ICD-10-CM

## 2022-04-25 DIAGNOSIS — E03.9 ACQUIRED HYPOTHYROIDISM: ICD-10-CM

## 2022-04-25 DIAGNOSIS — E03.9 ACQUIRED HYPOTHYROIDISM: Primary | ICD-10-CM

## 2022-04-25 PROCEDURE — 99214 OFFICE O/P EST MOD 30 MIN: CPT | Performed by: INTERNAL MEDICINE

## 2022-04-25 RX ORDER — LEVOTHYROXINE SODIUM 88 UG/1
88 TABLET ORAL
Qty: 90 TABLET | Refills: 3 | Status: SHIPPED | OUTPATIENT
Start: 2022-04-25

## 2022-04-25 NOTE — LETTER
4/25/2022    Patient: Surjit Rojas   YOB: 1980   Date of Visit: 4/25/2022     Anca Cheng, [address] [place] [phone]  Via Fax: [phone]    Dear Anca Cheng MD,      Thank you for referring Ms. Surjit Rojas to [address] [place] [address] for evaluation. My notes for this consultation are attached. If you have questions, please do not hesitate to call me. I look forward to following your patient along with you.       Sincerely,    Arsalan Wheat MD

## 2022-04-25 NOTE — PROGRESS NOTES
Roni Sanchez MD        Patient Information  Date:4/25/2022  Name : Armando Steven 37 y.o.     YOB: 1980             Chief Complaint   Patient presents with    Thyroid Problem       History of present illness        Armando Steven is a 37 y.o. female   She reestablished care in August 2019  She was seen in 2013 for primary hypothyroidism, she was pregnant then. She has long-standing history of hypothyroidism, was not compliant with the levothyroxine, levels have been fluctuating widely, highest TSH was 166,. Had ultrasound thyroid in May 2019 which showed diffuse goiter with possible right parathyroid adenoma. Calcium levels were normal.  No hypercalcemia, kidney stones, family history of hypercalcemia or kidney stones    She delivered a healthy male baby in 2020,   Weight has been stable      Prior history  Recent TSH is 99, she has stopped taking levothyroxine due to side effects. Reports chest pain whenever she takes levothyroxine, not really tremors, nervousness, jitteriness. Feels better when she stops. Thinks that the dose is higher. Last year when she took it consistently 100 mcg TSH was normal  Did not have menstrual cycles for 3 months, found to be pregnant, 12 weeks now    No weight gain, fatigue, constipation, dry skin  No chest pain or shortness of breath    Past Medical History:   Diagnosis Date    Acquired hypothyroidism     hypothyroidism - takes 75 mcg synthroid    Anemia     Anemia affecting pregnancy in third trimester     Depression     pt reports to RN \"when I was in my teens\"    Tobacco abuse        Current Outpatient Medications   Medication Sig    Unithroid 88 mcg tablet Take 1 Tablet by mouth Daily (before breakfast).  Stop 100 mcg Brand medically necessary    fluocinolone acetonide oiL (DermOtic Oil) 0.01 % drop 4 drops to each ear as needed for itching (Patient not taking: Reported on 4/25/2022)    ascorbic acid, vitamin C, (VITAMIN C) 250 mg tablet Take 250 mg by mouth as needed. (Patient not taking: Reported on 10/25/2021)     No current facility-administered medications for this visit. Review of Systems:  - Per HPI  -     Physical Examination:  - Blood pressure 128/68, pulse 82, temperature 97.7 °F (36.5 °C), temperature source Temporal, height 5' 1\" (1.549 m), weight 112 lb 3.2 oz (50.9 kg), SpO2 98 %, unknown if currently breastfeeding. Body mass index is 21.2 kg/m². - General: pleasant, no distress, good eye contact  - HEENT: no redness, no periorbital edema, EOMI  - Neck: No thyromegaly  - CVS: S1-S2 regular  - RS: Normal respiratory effort  - Musculoskeletal: no tremors  - Neurological: alert and oriented  - Psychiatric: normal mood and affect  - Skin: color,  normal.    Data Reviewed:       [x] Reviewed labs    Assessment/Plan:     Primary hypothyroidism:   Unithroid,  Biochemically euthyroid  She is compliant      Possible right parathyroid adenoma: Reported on the ultrasound  Calcium has been normal      Parosmia/ dysosmia: No allergies, no sinusitis  No Covid  Noticed some improvement  Seen ENT for evaluation who recommended MRI, she has not completed it yet          Thank you for allowing me to participate in the care of this patient.     Joel Matute MD      Patient verbalized understanding

## 2022-04-25 NOTE — PROGRESS NOTES
Ramirez Khalil is a 37 y.o. female here for   Chief Complaint   Patient presents with    Thyroid Problem       1. Have you been to the ER, urgent care clinic since your last visit? Hospitalized since your last visit? - no    2. Have you seen or consulted any other health care providers outside of the [address] [place] [address] since your last visit?   Include any pap smears or colon screening.- no

## 2022-07-22 NOTE — PROGRESS NOTES
Subjective:   Suresh Galan is a 29 y.o. female who is being seen today for possible pregnancy due to missed menses. LMP 04/10/17. She is a [id number]. All previous 7 deliveries were  without complications. Seen by dentist, had dental infection and placed on antibiotic. Patient unsure of the name. Has been taking Ibuprofen as needed for the pain. She is sexually active with  and is not using contraception. Allergies- reviewed:   No Known Allergies      Medications- reviewed:   Current Outpatient Prescriptions   Medication Sig    IBUPROFEN PO Take  by mouth.  OTHER Taking a antibiotic for a tooth infection but    levothyroxine (SYNTHROID) 75 mcg tablet Take 2 Tabs by mouth Daily (before breakfast). Indications: HYPOTHYROIDISM     No current facility-administered medications for this visit. Past Medical History- reviewed:  Past Medical History:   Diagnosis Date    Acquired hypothyroidism     hypothyroidism - takes 75 mcg synthroid    Anemia     Depression     Tobacco abuse          Past Surgical History- reviewed:   No past surgical history on file.       Social History- reviewed:  Social History     Social History    Marital status:      Spouse name: Ana Lilia Restrepo Number of children: 11    Years of education: N/A     Occupational History     Not Employed     Social History Main Topics    Smoking status: Current Every Day Smoker     Packs/day: 0.30     Years: 20.00     Types: Cigarettes     Last attempt to quit: 2016    Smokeless tobacco: Never Used      Comment: Smokes 3-5 cigarettes a day depending on the day    Alcohol use No    Drug use: No    Sexual activity: Yes     Partners: Male     Birth control/ protection: None     Other Topics Concern    Not on file     Social History Narrative         Immunizations- reviewed:   Immunization History   Administered Date(s) Administered    Influenza Vaccine 2014    Influenza Vaccine (Quad) PF 10/27/2016    Influenza Vaccine Intradermal PF 03/09/2016    Td, Adsorbed PF 01/24/2014    Tdap 06/17/2016       ROS:  General: No fevers or chills  GI: No abdominal pain, nausea, vomiting  : No vaginal bleeding      Objective:     Visit Vitals    /76    Pulse 69    Temp 97.8 °F (36.6 °C) (Oral)    Resp 16    Ht 5' (1.524 m)    Wt 106 lb (48.1 kg)    LMP 04/10/2017 (Exact Date)    SpO2 100%    BMI 20.7 kg/m2       Physical Exam:  GENERAL APPEARANCE: alert, well appearing, in no apparent distress  HEAD: normocephalic, atraumatic  LUNGS: clear to auscultation, no wheezes, rales or rhonchi, symmetric air entry  HEART: regular rate and rhythm, no murmurs  ABDOMEN: soft, nontender, nondistended, no abnormal masses, no epigastric pain  BACK: no CVA tenderness    Labs:  Urine pregnancy test positive  Recent Results (from the past 12 hour(s))   AMB POC URINE PREGNANCY TEST, VISUAL COLOR COMPARISON    Collection Time: 05/22/17  9:47 AM   Result Value Ref Range    VALID INTERNAL CONTROL POC Yes     HCG urine, Ql. (POC) Positive Negative         Assessment:   [id number]      ICD-10-CM ICD-9-CM    1. Missed menses N92.6 626.4 AMB POC URINE PREGNANCY TEST, VISUAL COLOR COMPARISON   2. Grand multipara Z64.1 V61.5        Pregnancy at 6 and 0/7 weeks by LMP. JENNA: 01/15/2018. Plan:   · Return to clinic in 2 week(s) for initial prenatal visit (patient planning to follow up with Dr. Saldana Sample)  · Continue prenatal vitamins  · Advised to call back the office to inform us the name of the antibiotic she is using for her gum infection. Advised to use Tylenol as needed for pain instead of Ibuprofen which can affect the baby. All questions answered. Pt indicates understanding and agreement with plan. I have discussed the diagnosis with the patient and the intended plan as seen in the above orders. The patient verbalized understanding of the treatment plan and agrees with the plan.  The patient has received an after-visit summary and questions were answered concerning future plans. I have discussed medication side effects and warnings with the patient as well. Informed pt to return to the office or go to the ER if she experiences vaginal bleeding, vaginal discharge, leaking of fluid, pelvic cramping.       Larisa Ray MD  Family Medicine Resident Griseofulvin Pregnancy And Lactation Text: This medication is Pregnancy Category X and is known to cause serious birth defects. It is unknown if this medication is excreted in breast milk but breast feeding should be avoided.

## 2022-12-12 PROBLEM — Z34.90 PREGNANCY: Status: RESOLVED | Noted: 2020-03-30 | Resolved: 2022-12-12

## 2022-12-12 PROBLEM — Z34.90 PREGNANT: Status: RESOLVED | Noted: 2020-10-16 | Resolved: 2022-12-12

## 2022-12-13 ENCOUNTER — ROUTINE PRENATAL (OUTPATIENT)
Dept: OBGYN CLINIC | Age: 42
End: 2022-12-13

## 2022-12-13 VITALS — SYSTOLIC BLOOD PRESSURE: 131 MMHG | BODY MASS INDEX: 22.48 KG/M2 | WEIGHT: 119 LBS | DIASTOLIC BLOOD PRESSURE: 81 MMHG

## 2022-12-13 DIAGNOSIS — E03.9 HYPOTHYROIDISM, UNSPECIFIED TYPE: ICD-10-CM

## 2022-12-13 DIAGNOSIS — O09.529 AMA (ADVANCED MATERNAL AGE) MULTIGRAVIDA 35+, UNSPECIFIED TRIMESTER: Primary | ICD-10-CM

## 2022-12-13 LAB
HEP B, EXTERNAL RESULT: NEGATIVE
HIV, EXTERNAL RESULT: NONREACTIVE
RPR, EXTERNAL RESULT: NONREACTIVE
RUBELLA TITER, EXTERNAL RESULT: NORMAL

## 2022-12-13 NOTE — PROGRESS NOTES
Orlin Bridges is a 40 y.o. female presents for a new pregnancy visit. Chief Complaint   Patient presents with    Initial Prenatal Visit       Problems:  none      Patient's last menstrual period was 2022 (exact date). Last Pap: normal obtained 2 year(s) ago on 3/30/20. LMP history:  The date of her LMP is certain. Her last menstrual period was normal and lasted for 4 to 5 days. A urine pregnancy test was positive 4 weeks ago. She was not on the pill at conception. Based on her LMP, her EDC is 23 and her EGA is 11 weeks,4 days. Her menstrual cycles are regular and occur approximately every 28 days  and range from 3 to 5 days. The last menses lasted the usual number of days. Ultrasound data:  She had an  ultrasound done by the ultrasound tech today which revealed a viable vail pregnancy with a gestational age of 16 weeks and 2 days giving an EDC of 23. TA ULTRASOUND PERFORMED  A SINGLE VIABLE 12W2D IUP IS SEEN WITH NORMAL CARDIAC RHYTHM. GESTATIONAL AGE BASED ON TODAY'S ULTRASOUND. A NORMAL PLACENTA IS SEEN. THERE APPEARS TO BE A SUBCHORIONIC HEMORRHAGE THAT MEASURES 21 X 5 X 15MM. RIGHT OVARY NOT VISUALIZED. RIGHT ADNEXA APPEARS WITHIN NORMAL LIMITS. LEFT OVARY APPEARS WITHIN NORMAL LIMITS. NO FREE FLUID SEEN IN THE CDS. Pregnancy symptoms:    Since her LMP she has experienced: dysuria, breast tenderness and nausea. She has not been vomiting over the last few weeks. Associated signs and symptoms which she denies: discharge, vaginal bleeding. She states she has gained weight:  Approximately 5 pounds over the last few weeks. Relevant past pregnancy history:   She has the following pregnancy history: Thyroid dysfunction    She has no history of  delivery. Relevant past medical history:(relevant to this pregnancy): noncontributory. 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      1. Have you been to the ER, urgent care clinic, or hospitalized since your last visit? No    2. Have you seen or consulted any other health care providers outside of the [address] [place] [address] since your last visit?  No    Examination chaperoned by Prince Purcell MA.

## 2022-12-13 NOTE — PROGRESS NOTES
Current pregnancy history:    Madi Erickson is a ,  40 y.o. female WHITE/NON- Patient's last menstrual period was 2022 (exact date). .  She presents for the evaluation of amenorrhea and a positive pregnancy test.    Patient's last menstrual period was 2022 (exact date). Last Pap: normal obtained 2 year(s) ago on 3/30/20. LMP history:  The date of her LMP is certain. Her last menstrual period was normal and lasted for 4 to 5 days. A urine pregnancy test was positive 4 weeks ago. She was not on the pill at conception. Based on her LMP, her EDC is 23 and her EGA is 11 weeks,4 days. Her menstrual cycles are regular and occur approximately every 28 days  and range from 3 to 5 days. The last menses lasted the usual number of days. Ultrasound data:  She had an  ultrasound done by the ultrasound tech today which revealed a viable vail pregnancy with a gestational age of 16 weeks and 2 days giving an EDC of 23. TA ULTRASOUND PERFORMED  A SINGLE VIABLE 12W2D IUP IS SEEN WITH NORMAL CARDIAC RHYTHM. GESTATIONAL AGE BASED ON TODAY'S ULTRASOUND. A NORMAL PLACENTA IS SEEN. THERE APPEARS TO BE A SUBCHORIONIC HEMORRHAGE THAT MEASURES 21 X 5 X 15MM. RIGHT OVARY NOT VISUALIZED. RIGHT ADNEXA APPEARS WITHIN NORMAL LIMITS. LEFT OVARY APPEARS WITHIN NORMAL LIMITS. NO FREE FLUID SEEN IN THE CDS. Pregnancy symptoms:     Since her LMP she has experienced: dysuria, breast tenderness and nausea. She has not been vomiting over the last few weeks. Associated signs and symptoms which she denies: discharge, vaginal bleeding. She states she has gained weight:  Approximately 5 pounds over the last few weeks. Relevant past pregnancy history:              She has the following pregnancy history: Thyroid dysfunction               She has no history of  delivery. Relevant past medical history:(relevant to this pregnancy): noncontributory.        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 Substance history: negative for alcohol, tobacco and street drugs. Positive for nothing. She stopped smoking recently  Exposure history: There is/are no indoor cat/s in the home. The patient was instructed to not change the cat litter. She admits close contact with children on a regular basis. She has had chicken pox or the vaccine in the past.   Patient denies issues with domestic violence. Genetic Screening/Teratology Counseling: (Includes patient, baby's father, or anyone in either family with:)  3.  Patient's age >/= 28 at [facility] 40?-- no  .   2. Thalassemia ([facility], [place], [address], or  background): MCV<80?--no.     3.  Neural tube defect (meningomyelocele, spina bifida, anencephaly)?--no.   4.  Congenital heart defect?--no.  5.  Down syndrome?--no.   6.  Pierre-Sachs (Amish, [place])?--no.   7.  Canavan's Disease?--no.   8.  Familial Dysautonomia?--no.   9.  Sickle cell disease or trait ()? --no   The patient has not been tested for sickle trait  10. Hemophilia or other blood disorders?--no. 11.  Muscular dystrophy?--no. 12.  Cystic fibrosis?--no. 13.  [place]'s Chorea?--no. 14.  Mental retardation/autism (if yes was person tested for Fragile X)?--no. 15.  Other inherited genetic or chromosomal disorder?--no. 12.  Maternal metabolic disorder (DM, PKU, etc)?--no. 17.  Patient or FOB with a child with a birth defect not listed above?--no.  17a. Patient or FOB with a birth defect themselves?--no. 18.  Recurrent pregnancy loss, or stillbirth?--no. 19.  Any medications since LMP other than prenatal vitamins (include vitamins, supplements, OTC meds, drugs, alcohol)?--no. 20.  Any other genetic/environmental exposure to discuss?--no. Infection History:  1.   Lives with someone with TB or TB exposed?--no.   2.  Patient or partner has history of genital herpes?--no.  3.  Rash or viral illness since LMP?--no.    4. History of STD (GC, CT, HPV, syphilis, HIV)? --no   5. Other: OTHER? Past Medical History:   Diagnosis Date    Acquired hypothyroidism     hypothyroidism - takes 75 mcg synthroid    Anemia     Anemia affecting pregnancy in third trimester     Depression     pt reports to RN \"when I was in my teens\"    Tobacco abuse      No past surgical history on file.   Social History     Occupational History     Employer: NOT EMPLOYED   Tobacco Use    Smoking status: Former     Packs/day: 0.30     Years: 20.00     Pack years: 6.00     Types: Cigarettes     Quit date: 2020     Years since quittin.8    Smokeless tobacco: Never    Tobacco comments:     Quit when she found out she was pregnant   Substance and Sexual Activity    Alcohol use: No     Alcohol/week: 0.0 standard drinks    Drug use: No    Sexual activity: Yes     Partners: Male     Birth control/protection: None     Family History   Problem Relation Age of Onset    Cancer Maternal Aunt     Diabetes Maternal Uncle     Cancer Maternal Grandfather     Heart Disease Mother     Heart Disease Father      OB History    Para Term  AB Living   10 9 9 0 0 9   SAB IAB Ectopic Molar Multiple Live Births   0 0 0   0 9      # Outcome Date GA Lbr Miguel/2nd Weight Sex Delivery Anes PTL Lv   10 Current            9 Term 10/16/20 40w2d  8 lb 14 oz (4.025 kg) M Vag-Spont None N TRIP   8 Term 18 39w6d 06:34 / 00:32 6 lb 14.6 oz (3.135 kg) F Vag-Spont None N TRIP   7 Term 04/10/14 40w5d 00:48 / 00:01 7 lb 13.2 oz (3.55 kg) M VAGINAL DELI None N TRIP   6 Term 12 39w5d 07:57 / 00:05 8 lb 11.3 oz (3.95 kg) M VAGINAL DELI None N TRIP   5 Term 11 40w6d   F VAGINAL DELI None N TRIP      Birth Comments: Clavicle fracture   4 Term 06    M VAGINAL DELI None N TRIP   3 Term 05    M VAGINAL DELI None N TRIP   2 Term 04    F VAGINAL DELI EPI N TRIP   1 Term      Vag-Spont   TRIP     No Known Allergies  Prior to Admission medications    Medication Sig Start Date End Date Taking? Authorizing Provider   ferrous sulfate (IRON PO) Take  by mouth. Yes Provider, Historical   PNV Comb #2-Iron-Omega 3-FA [id number] mg cmpk Take  by mouth. Yes Provider, Historical   Unithroid 88 mcg tablet Take 1 Tablet by mouth Daily (before breakfast). Stop 100 mcg Brand medically necessary 4/25/22  Yes Werner Friend MD   fluocinolone acetonide oiL (DermOtic Oil) 0.01 % drop 4 drops to each ear as needed for itching  Patient not taking: Reported on 4/25/2022 11/15/21   Octavio Carmona MD   ascorbic acid, vitamin C, (VITAMIN C) 250 mg tablet Take 250 mg by mouth as needed.   Patient not taking: Reported on 10/25/2021    Provider, Historical        Review of Systems: History obtained from the patient  Constitutional: negative for weight loss, fever, night sweats  HEENT: negative for hearing loss, earache, congestion, snoring, sore throat  CV: negative for chest pain, palpitations, edema  Resp: negative for cough, shortness of breath, wheezing  Breast: negative for breast lumps, nipple discharge, galactorrhea  GI: negative for change in bowel habits, abdominal pain, black or bloody stools  : negative for frequency, dysuria, hematuria, vaginal discharge  MSK: negative for back pain, joint pain, muscle pain  Skin: negative for itching, rash, hives  Neuro: negative for dizziness, headache, confusion, weakness  Psych: negative for anxiety, depression, change in mood  Heme/lymph: negative for bleeding, bruising, pallor    Objective:  Visit Vitals  /81   Wt 119 lb (54 kg)   LMP 09/23/2022 (Exact Date)   BMI 22.48 kg/m²       Physical Exam:     Constitutional  Appearance: well-nourished, well developed, alert, in no acute distress    HENT  Head  Face: appears normal  Eyes: appear normal  Ears: normal  Mouth: normal  Lips: no lesions    Neck  Inspection/Palpation: normal appearance, no masses or tenderness  Lymph Nodes: no lymphadenopathy present  Thyroid: gland size normal, nontender, no nodules or masses present on palpation    Chest  Respiratory Effort: breathing unlabored  Auscultation: normal breath sounds    Cardiovascular  Heart: Auscultation: regular rate and rhythm without murmur    Breasts  Inspection of Breasts: breasts symmetrical, no skin changes, no discharge present, nipple appearance normal, no skin retraction present  Palpation of Breasts and Axillae: no masses present on palpation, no breast tenderness  Axillary Lymph Nodes: no lymphadenopathy present    Gastrointestinal  Abdominal Examination: abdomen non-tender to palpation, normal bowel sounds, no masses present  Liver and spleen: no hepatomegaly present, spleen not palpable  Hernias: no hernias identified    Genitourinary  External Genitalia: normal appearance for age, no discharge present, no tenderness present, no inflammatory lesions present, no masses present, no atrophy present  Vagina: normal vaginal vault without central or paravaginal defects, no discharge present, no inflammatory lesions present, no masses present  Bladder: non-tender to palpation  Urethra: appears normal  Cervix: normal   Uterus: enlarged, normal shape, soft  Adnexa: no adnexal tenderness present, no adnexal masses present  Perineum: perineum within normal limits, no evidence of trauma, no rashes or skin lesions present  Anus: anus within normal limits, no hemorrhoids present  Inguinal Lymph Nodes: no lymphadenopathy present    Skin  General Inspection: no rash, no lesions identified    Neurologic/Psychiatric  Mental Status:  Orientation: grossly oriented to person, place and time  Mood and Affect: mood normal, affect appropriate    Assessment:   Intrauterine pregnancy with the following problems identified: grand multip, AMA, hypothyroid.     Plan:     Offered CF testing, CVS, Nuchal Translucency, MSAFP, amnio, and discussed NIPT  Course of pregnancy discussed including visit schedule, routine U/S, glucola testing, etc.  Avoid alcoholic beverages and illicit/recreational drugs use  Take prenatal vitamins or folic acid daily. Hospital and practice style discussed with coverage system. Discussed nutrition, toxoplasmosis precautions, sexual activity, exercise, need for influenza vaccine, environmental and work hazards, travel advice, screen for domestic violence, need for seat belts. Discussed seafood, unpasteurized dairy products, deli meat, artificial sweeteners, and caffeine. Information on prenatal classes/breastfeeding given. Information on circumcision given  Patient encouraged not to smoke. Discussed current prescription drug use. Given medication list.  Discussed the use of over the counter medications and chemicals. .  Pt understands risk of hemorrhage during pregnancy and post delivery and would accept blood products if necessary in life-threatening emergencies    Refer to [address] [place] [address] for HOMA. Mala sent today    Handouts given to pt.

## 2022-12-17 LAB
ABO GROUP BLD: NORMAL
BACTERIA UR CULT: NO GROWTH
BLD GP AB SCN SERPL QL: NEGATIVE
C TRACH RRNA CVX QL NAA+PROBE: NEGATIVE
CYTOLOGIST CVX/VAG CYTO: NORMAL
CYTOLOGY CVX/VAG DOC CYTO: NORMAL
CYTOLOGY CVX/VAG DOC THIN PREP: NORMAL
CYTOLOGY HISTORY:: NORMAL
DX ICD CODE: NORMAL
ERYTHROCYTE [DISTWIDTH] IN BLOOD BY AUTOMATED COUNT: 15 % (ref 11.7–15.4)
HBV SURFACE AG SERPL QL IA: NEGATIVE
HCT VFR BLD AUTO: 35.7 % (ref 34–46.6)
HCV AB S/CO SERPL IA: <0.1 S/CO RATIO (ref 0–0.9)
HGB A MFR BLD ELPH: 97.6 % (ref 96.4–98.8)
HGB A2 MFR BLD ELPH: 2.4 % (ref 1.8–3.2)
HGB BLD-MCNC: 12.1 G/DL (ref 11.1–15.9)
HGB F MFR BLD ELPH: 0 % (ref 0–2)
HGB FRACT BLD-IMP: NORMAL
HGB S MFR BLD ELPH: 0 %
HIV 1+2 AB+HIV1 P24 AG SERPL QL IA: NON REACTIVE
HPV GENOTYPE REFLEX: NORMAL
HPV I/H RISK 4 DNA CVX QL PROBE+SIG AMP: NEGATIVE
Lab: NORMAL
Lab: NORMAL
MCH RBC QN AUTO: 30.7 PG (ref 26.6–33)
MCHC RBC AUTO-ENTMCNC: 33.9 G/DL (ref 31.5–35.7)
MCV RBC AUTO: 91 FL (ref 79–97)
N GONORRHOEA RRNA CVX QL NAA+PROBE: NEGATIVE
OTHER STN SPEC: NORMAL
PLATELET # BLD AUTO: 286 X10E3/UL (ref 150–450)
RBC # BLD AUTO: 3.94 X10E6/UL (ref 3.77–5.28)
RH BLD: POSITIVE
RUBV IGG SERPL IA-ACNC: 6.54 INDEX
STAT OF ADQ CVX/VAG CYTO-IMP: NORMAL
T VAGINALIS RRNA SPEC QL NAA+PROBE: NEGATIVE
TREPONEMA PALLIDUM IGG+IGM AB [PRESENCE] IN SERUM OR PLASMA BY IMMUNOASSAY: NON REACTIVE
TSH SERPL DL<=0.005 MIU/L-ACNC: 8.86 UIU/ML (ref 0.45–4.5)
WBC # BLD AUTO: 6.5 X10E3/UL (ref 3.4–10.8)

## 2022-12-19 ENCOUNTER — PATIENT MESSAGE (OUTPATIENT)
Dept: OBGYN CLINIC | Age: 42
End: 2022-12-19

## 2022-12-19 ENCOUNTER — TELEPHONE (OUTPATIENT)
Dept: OBGYN CLINIC | Age: 42
End: 2022-12-19

## 2022-12-19 NOTE — PROGRESS NOTES
Per Dr. Rachelle Castro and spoke with patient regarding recent lab results. Patient states she already sees Endo and has a follow-up appt with them early February. Patient verbalized understanding and has no questions at this time.

## 2022-12-19 NOTE — TELEPHONE ENCOUNTER
Per Dr. Jb Giang and spoke with patient regarding recent lab results. Per MD, Her thyroid test is abnormal. She needs to see her endocrinologist.    Patient verbalized understanding and has no questions at this time.

## 2022-12-20 ENCOUNTER — TELEPHONE (OUTPATIENT)
Dept: ENDOCRINOLOGY | Age: 42
End: 2022-12-20

## 2022-12-20 DIAGNOSIS — E03.9 ACQUIRED HYPOTHYROIDISM: Primary | ICD-10-CM

## 2022-12-20 NOTE — TELEPHONE ENCOUNTER
Patient is pregnant. Advised to call Dr. Dominick Ventura because TSH is high.   Patient asking to speak with Dr. Dominick Ventura

## 2022-12-20 NOTE — TELEPHONE ENCOUNTER
Unithroid 88 mcg 1 tablet Monday through a, 2 tablets Saturday and Sunday  Labs in 5 weeks TSH, free T4

## 2022-12-22 NOTE — PROGRESS NOTES
s/w Joanne Valles who states she scanned into chart what she received. She will reach out to Karen harris/ Jacky Conner to get remainder of report.        This MA also looked up results through [employer] which indicated\" low risk, XY

## 2022-12-27 ENCOUNTER — HOSPITAL ENCOUNTER (OUTPATIENT)
Dept: PERINATAL CARE | Age: 42
Discharge: HOME | End: 2022-12-27
Attending: OBSTETRICS & GYNECOLOGY
Payer: MEDICAID

## 2023-01-08 PROBLEM — R43.1 PAROSMIA: Status: RESOLVED | Noted: 2021-11-15 | Resolved: 2023-01-08

## 2023-01-08 PROBLEM — O09.529 AMA (ADVANCED MATERNAL AGE) MULTIGRAVIDA 35+: Status: ACTIVE | Noted: 2023-01-08

## 2023-01-10 ENCOUNTER — ROUTINE PRENATAL (OUTPATIENT)
Dept: OBGYN CLINIC | Age: 43
End: 2023-01-10
Payer: MEDICAID

## 2023-01-10 VITALS — WEIGHT: 123 LBS | BODY MASS INDEX: 23.24 KG/M2 | SYSTOLIC BLOOD PRESSURE: 122 MMHG | DIASTOLIC BLOOD PRESSURE: 74 MMHG

## 2023-01-10 DIAGNOSIS — O46.8X1 SUBCHORIONIC HEMORRHAGE OF PLACENTA IN FIRST TRIMESTER, FETUS 1 OF MULTIPLE GESTATION: ICD-10-CM

## 2023-01-10 DIAGNOSIS — O41.8X11 SUBCHORIONIC HEMORRHAGE OF PLACENTA IN FIRST TRIMESTER, FETUS 1 OF MULTIPLE GESTATION: ICD-10-CM

## 2023-01-10 PROBLEM — O41.8X90 SUBCHORIONIC HEMORRHAGE: Status: ACTIVE | Noted: 2022-12-13

## 2023-01-10 PROBLEM — O46.8X9 SUBCHORIONIC HEMORRHAGE: Status: ACTIVE | Noted: 2022-12-13

## 2023-01-10 PROBLEM — H60.8X3 CHRONIC ECZEMATOUS OTITIS EXTERNA OF BOTH EARS: Status: RESOLVED | Noted: 2021-11-15 | Resolved: 2023-01-10

## 2023-01-10 PROBLEM — R43.2 DYSGEUSIA: Status: RESOLVED | Noted: 2021-11-15 | Resolved: 2023-01-10

## 2023-01-10 PROCEDURE — 0502F SUBSEQUENT PRENATAL CARE: CPT | Performed by: OBSTETRICS & GYNECOLOGY

## 2023-01-10 NOTE — PROGRESS NOTES
OB VISIT      Current EGA:   15w4d    Visit Notes:  Doing Well. No complaints  Total weight gain is 4 lb (1.814 kg). Problem List:  Dating LMP = 11wk US  AMA - [address] multip  Hypothyroid - seeing Endocrine  Subchorionic hemorrhage    Visit Diagnoses:    ICD-10-CM ICD-9-CM    1. Subchorionic hemorrhage of placenta in first trimester, fetus 1 of multiple gestation  O41.8X11 658.83     O46.8X1 641.83         Follow Up:  Return in about 1 month (around 2/13/2023) for OB Ultrasound, Routine OB Visit.     Edna Echols MD  01/10/23  4:43 PM

## 2023-01-30 ENCOUNTER — TELEPHONE (OUTPATIENT)
Dept: ENDOCRINOLOGY | Age: 43
End: 2023-01-30

## 2023-01-30 DIAGNOSIS — E03.9 ACQUIRED HYPOTHYROIDISM: ICD-10-CM

## 2023-01-30 RX ORDER — LEVOTHYROXINE SODIUM 88 UG/1
TABLET ORAL
Qty: 126 TABLET | Refills: 3 | Status: SHIPPED | OUTPATIENT
Start: 2023-01-30

## 2023-01-30 NOTE — TELEPHONE ENCOUNTER
----- Message from Hakan Hancock LPN sent at 8/51/4587  8:17 AM EST -----    ----- Message -----  From: William Seals MD  Sent: 1/29/2023   1:21 PM EST  To: Hakan Hancock LPN    Thyroid level TSH improved from 8 to 3.5 but since you are pregnant you need little higher dose. Unithroid 1 tablet Monday through Thursday, 2 tablets on Friday Saturday and Sunday, you need total of 10tablets/week.   Please change the prescription

## 2023-01-30 NOTE — TELEPHONE ENCOUNTER
Pt read my chart message sent by provider .    Order placed for pt per verbal order with read back from Dr. Major Zapata 01/30/23

## 2023-02-13 ENCOUNTER — HOSPITAL ENCOUNTER (OUTPATIENT)
Dept: PERINATAL CARE | Age: 43
Discharge: HOME | End: 2023-02-13
Attending: OBSTETRICS & GYNECOLOGY
Payer: MEDICAID

## 2023-02-13 ENCOUNTER — ROUTINE PRENATAL (OUTPATIENT)
Dept: OBGYN CLINIC | Age: 43
End: 2023-02-13
Payer: MEDICAID

## 2023-02-13 VITALS — SYSTOLIC BLOOD PRESSURE: 108 MMHG | BODY MASS INDEX: 23.73 KG/M2 | WEIGHT: 125.6 LBS | DIASTOLIC BLOOD PRESSURE: 63 MMHG

## 2023-02-13 DIAGNOSIS — Z3A.20 20 WEEKS GESTATION OF PREGNANCY: Primary | ICD-10-CM

## 2023-02-13 PROCEDURE — 0502F SUBSEQUENT PRENATAL CARE: CPT | Performed by: OBSTETRICS & GYNECOLOGY

## 2023-02-13 PROCEDURE — 76811 OB US DETAILED SNGL FETUS: CPT | Performed by: OBSTETRICS & GYNECOLOGY

## 2023-02-13 NOTE — PROGRESS NOTES
OB VISIT      Current EGA:   20w3d    Visit Notes:  Doing Well. +Fetal Movement. No Ucs. No LOF or VB. Had [address] [place] [address] today  Total weight gain is 4 lb (1.814 kg). Problem List:   Dating LMP = 11wk US   AMA - NIPT WNL    Boy   Grand multip   Hypothyroid - seeing Endocrine   Subchorionic hemorrhage    Visit Diagnoses:    ICD-10-CM ICD-9-CM    1. 20 weeks gestation of pregnancy  Z3A.20 V22.2         Follow Up:  Return in about 4 weeks (around 3/13/2023) for Routine OB Visit.     Prakash Pool MD  02/13/23  11:23 AM

## 2023-02-27 ENCOUNTER — OFFICE VISIT (OUTPATIENT)
Dept: ENDOCRINOLOGY | Age: 43
End: 2023-02-27
Payer: MEDICAID

## 2023-02-27 VITALS
RESPIRATION RATE: 18 BRPM | TEMPERATURE: 98 F | BODY MASS INDEX: 25.11 KG/M2 | WEIGHT: 133 LBS | SYSTOLIC BLOOD PRESSURE: 102 MMHG | OXYGEN SATURATION: 99 % | HEIGHT: 61 IN | HEART RATE: 89 BPM | DIASTOLIC BLOOD PRESSURE: 62 MMHG

## 2023-02-27 DIAGNOSIS — E03.9 ACQUIRED HYPOTHYROIDISM: Primary | ICD-10-CM

## 2023-02-27 DIAGNOSIS — D35.1 PARATHYROID ADENOMA: ICD-10-CM

## 2023-02-27 PROCEDURE — 99214 OFFICE O/P EST MOD 30 MIN: CPT | Performed by: INTERNAL MEDICINE

## 2023-02-27 NOTE — PROGRESS NOTES
Caitlny Worrell is a 40 y.o. female here for   Chief Complaint   Patient presents with    Thyroid Problem       1. Have you been to the ER or an urgent care clinic since your last visit?  - no    2. Have you been hospitalized since your last visit? - no    3. Have you seen or consulted any other health care providers outside of the [address] [place] [address] since your last visit?   Include any pap smears or colon screening.- no

## 2023-02-27 NOTE — PROGRESS NOTES
Dayana Amato MD        Patient Information  Date:2/27/2023  Name : Lizzie Velazquez 40 y.o.     YOB: 1980             Chief Complaint   Patient presents with    Thyroid Problem       History of present illness    Lizzie Velazquez is a 40 y.o. female     2/27/23  23 weeks pregnant - due 6/30/23  Taking Unithroid consistently  No lump in throat/neck  No tachycardia    Prior history  She reestablished care in August 2019  She was seen in 2013 for primary hypothyroidism, she was pregnant then. She has long-standing history of hypothyroidism, was not compliant with the levothyroxine, levels have been fluctuating widely, highest TSH was 166,. Had ultrasound thyroid in May 2019 which showed diffuse goiter with possible right parathyroid adenoma. Calcium levels were normal. No hypercalcemia, kidney stones, family history of hypercalcemia or kidney stones    She delivered a healthy male baby in 2020,   Weight has been stable      Prior history  Recent TSH is 99, she has stopped taking levothyroxine due to side effects. Reports chest pain whenever she takes levothyroxine, not really tremors, nervousness, jitteriness. Feels better when she stops. Thinks that the dose is higher.   Last year when she took it consistently 100 mcg TSH was normal  Did not have menstrual cycles for 3 months, found to be pregnant, 12 weeks now    No weight gain, fatigue, constipation, dry skin  No chest pain or shortness of breath    Past Medical History:   Diagnosis Date    Acquired hypothyroidism     hypothyroidism - takes 75 mcg synthroid    Anemia     Chronic eczematous otitis externa of both ears 11/15/2021    Depression     pt reports to RN \"when I was in my teens\"    Dysgeusia 11/15/2021    Hypothyroid 11/15/2013    Inconsistent use during all pregnancies     IUD complication ([facility] [address]     Angela Brandt out    Parosmia 11/15/2021    Tobacco abuse     Quit       Current Outpatient Medications Medication Sig    Unithroid 88 mcg tablet 1 tablet Monday through Thursday, 2 tablets on Friday Saturday and Sunday Brand medically necessary    ferrous sulfate (IRON PO) Take  by mouth. PNV Comb #2-Iron-Omega 3-FA [id number] mg cmpk Take  by mouth. fluocinolone acetonide oiL (DermOtic Oil) 0.01 % drop 4 drops to each ear as needed for itching (Patient not taking: No sig reported)    ascorbic acid, vitamin C, (VITAMIN C) 250 mg tablet Take 250 mg by mouth as needed. (Patient not taking: No sig reported)     No current facility-administered medications for this visit. Review of Systems:  Per HPI      Physical Examination:  Blood pressure 102/62, pulse 89, temperature 98 °F (36.7 °C), temperature source Temporal, resp. rate 18, height 5' 1\" (1.549 m), weight 133 lb (60.3 kg), last menstrual period 09/23/2022, SpO2 99 %, unknown if currently breastfeeding. Body mass index is 25.13 kg/m². General: pleasant, no distress, good eye contact  HEENT: no redness, no periorbital edema, EOMI  Neck: No thyromegaly  CVS: S1-S2 regular  RS: Normal respiratory effort  Musculoskeletal: no tremors  Neurological: alert and oriented  Psychiatric: normal mood and affect  Skin: color,  normal.    Data Reviewed:       [x] Reviewed labs    Assessment/Plan:     Primary hypothyroidism:   Unithroid,  Biochemically euthyroid  She is compliant  23 weeks pregnant, JENNA June 2023  TFTs      Possible right parathyroid adenoma: Reported on the ultrasound  Calcium has been normal      Parosmia/ dysosmia: No allergies, no sinusitis  No Covid  Noticed some improvement  Seen ENT for evaluation who recommended MRI, she has not completed it yet          Thank you for allowing me to participate in the care of this patient.     Afshin Simmons MD      Patient verbalized understanding

## 2023-03-13 ENCOUNTER — ROUTINE PRENATAL (OUTPATIENT)
Dept: OBGYN CLINIC | Age: 43
End: 2023-03-13
Payer: MEDICAID

## 2023-03-13 VITALS — WEIGHT: 131.8 LBS | BODY MASS INDEX: 24.9 KG/M2 | DIASTOLIC BLOOD PRESSURE: 66 MMHG | SYSTOLIC BLOOD PRESSURE: 107 MMHG

## 2023-03-13 DIAGNOSIS — Z3A.24 24 WEEKS GESTATION OF PREGNANCY: Primary | ICD-10-CM

## 2023-03-13 PROCEDURE — 0502F SUBSEQUENT PRENATAL CARE: CPT | Performed by: OBSTETRICS & GYNECOLOGY

## 2023-03-13 NOTE — PROGRESS NOTES
OB VISIT      Current EGA:   24w3d    Visit Notes:  Doing Well. +Fetal Movement. No Ucs. No LOF or VB. Total weight gain is 12 lb 12.8 oz (5.806 kg). Problem List:  Dating LMP = 11wk US  AMA - [address] [facility] multip  Hypothyroid - seeing Endocrine (1/20 normal labs)  Subchorionic hemorrhage resolved    Visit Diagnoses:    ICD-10-CM ICD-9-CM    1. 24 weeks gestation of pregnancy  Z3A.24 V22.2         Follow Up:  Return in about 4 weeks (around 4/10/2023) for Glucola, Routine OB Visit.     Guillaume Parker MD  03/13/23  4:33 PM

## 2023-05-01 ENCOUNTER — ROUTINE PRENATAL (OUTPATIENT)
Dept: OBGYN CLINIC | Age: 43
End: 2023-05-01
Payer: MEDICAID

## 2023-05-01 VITALS — BODY MASS INDEX: 26.26 KG/M2 | SYSTOLIC BLOOD PRESSURE: 146 MMHG | DIASTOLIC BLOOD PRESSURE: 82 MMHG | WEIGHT: 139 LBS

## 2023-05-01 DIAGNOSIS — Z3A.31 31 WEEKS GESTATION OF PREGNANCY: Primary | ICD-10-CM

## 2023-05-01 DIAGNOSIS — N76.0 VAGINITIS AND VULVOVAGINITIS: ICD-10-CM

## 2023-05-01 PROCEDURE — 0502F SUBSEQUENT PRENATAL CARE: CPT | Performed by: OBSTETRICS & GYNECOLOGY

## 2023-05-01 RX ORDER — CLOTRIMAZOLE AND BETAMETHASONE DIPROPIONATE 10; .64 MG/G; MG/G
CREAM TOPICAL
Qty: 15 G | Refills: 0 | Status: SHIPPED | OUTPATIENT
Start: 2023-05-01 | End: 2023-05-11

## 2023-05-01 NOTE — PROGRESS NOTES
OB VISIT      Current EGA:   31w3d    Visit Notes:  Doing Well. +Fetal Movement. No Ucs. No LOF or VB. States flustered today over glass in hand and is hurting so BP elevated. No symptoms of pre E  C/o vuvlar itch. Visit Vitals  BP (!) 146/82   Wt 139 lb (63 kg)   BMI 26.26 kg/m²     Total weight gain is 20 lb (9.072 kg). Problem List:  Dating LMP = 11wk US  AMA - 100 [facility] [place] multip  Hypothyroid - seeing Endocrine (1/20 normal labs)  Subchorionic hemorrhage resolved    Visit Diagnoses:    ICD-10-CM ICD-9-CM    1. 31 weeks gestation of pregnancy  Z3A.31 V22.2       2. Vaginitis and vulvovaginitis  N76.0 616.10         Follow Up:  Return in about 8 days (around 5/9/2023) for Routine OB Visit.     Niru Dawkins MD  05/01/23  4:13 PM

## 2023-05-10 ENCOUNTER — HOSPITAL ENCOUNTER (OUTPATIENT)
Facility: HOSPITAL | Age: 43
Discharge: HOME | End: 2023-05-13

## 2023-05-10 NOTE — PROCEDURES
Indication  ========    Advance Maternal Age  Hypothyroidism    Method  ======    Transabdominal ultrasound examination, View: Good view    Pregnancy  =========    Mccann pregnancy. Number of fetuses: 1    Dating  ======    LMP on: 9/23/2022  GA by LMP 28 w + 5 d  AGATA by LMP: 6/30/2023  Previous Ultrasound on: 12/13/2022  Type of prior assessment: GA  GA at prior assessment date 12 w + 2 d  GA by previous U/S 35 w + 3 d  AGATA by previous Ultrasound: 6/25/2023  Ultrasound examination on: 5/10/2023  GA by U/S based upon: [facility], BPD, Femur, HC  GA by U/S 35 w + 1 d  AGATA by U/S: 6/27/2023  Assigned: based on the LMP, selected on 02/13/2023  Assigned GA 32 w + 5 d  Assigned AGATA: 6/30/2023    Fetal Biometry  ============    Standard  BPD 81.6 mm 32w 6d 45% Hadlock  .3 mm 34w 1d 80% Pete  .3 mm 32w 6d 18% Hadlock  .7 mm 34w 6d 95% Hadlock  Femur 61.0 mm 31w 5d 14% Hadlock  EFW 2,223 g 33w 2d 67% Hadlock  EFW (lb) 4 lb  EFW (oz) 14 oz  EFW by: Hadlock (BPD-HC-AC-FL)  Extended   5.3 mm  Other Structures   bpm    General Evaluation  ==============    Cardiac activity present.  bpm. Fetal movements: visualized, visualized. Presentation: cephalic  Placenta: anterior  Umbilical cord: Cord vessels: 3 vessel cord  Amniotic fluid: Amount of AF: polyhydramnios. MVP 9.4 cm. GILBERTO 25.2 cm. Q1 9.4 cm, Q2 3.9 cm, Q3 4.7 cm, Q4 7.2 cm    Fetal Anatomy  ===========    Cranium: normal  Lateral ventricles: normal  Midline falx: normal  4-chamber view: normal  LVOT view: suboptimal  Stomach: normal  Kidneys: normal  Bladder: normal  Fetal sex: male  Wants to know fetal sex: yes    Biophysical Profile  ==============    2: Fetal breathing movements  2: Gross body movements  2: Fetal tone  2: Amniotic fluid volume  8/8 Biophysical profile score    Findings  =======    Follow up ultrasound ([id number])    This is a mccann pregnancy with biometry consistent with prior dating.  Anatomy appears normal as noted

## 2023-05-11 PROBLEM — O40.3XX0 POLYHYDRAMNIOS IN THIRD TRIMESTER: Status: ACTIVE | Noted: 2023-05-11

## 2023-05-15 ENCOUNTER — ROUTINE PRENATAL (OUTPATIENT)
Age: 43
End: 2023-05-15

## 2023-05-15 VITALS — WEIGHT: 143.6 LBS | BODY MASS INDEX: 27.13 KG/M2 | SYSTOLIC BLOOD PRESSURE: 127 MMHG | DIASTOLIC BLOOD PRESSURE: 78 MMHG

## 2023-05-15 DIAGNOSIS — O09.529 ANTEPARTUM MULTIGRAVIDA OF ADVANCED MATERNAL AGE: ICD-10-CM

## 2023-05-15 DIAGNOSIS — O41.8X30 SUBCHORIONIC HEMORRHAGE OF PLACENTA IN THIRD TRIMESTER, SINGLE OR UNSPECIFIED FETUS: ICD-10-CM

## 2023-05-15 DIAGNOSIS — E03.9 HYPOTHYROIDISM, UNSPECIFIED TYPE: ICD-10-CM

## 2023-05-15 DIAGNOSIS — O09.40 GRAND MULTIPARITY WITH CURRENT PREGNANCY, ANTEPARTUM: ICD-10-CM

## 2023-05-15 DIAGNOSIS — O46.8X3 SUBCHORIONIC HEMORRHAGE OF PLACENTA IN THIRD TRIMESTER, SINGLE OR UNSPECIFIED FETUS: ICD-10-CM

## 2023-05-15 DIAGNOSIS — Z3A.33 33 WEEKS GESTATION OF PREGNANCY: Primary | ICD-10-CM

## 2023-05-15 DIAGNOSIS — O40.3XX0 POLYHYDRAMNIOS IN THIRD TRIMESTER COMPLICATION, SINGLE OR UNSPECIFIED FETUS: ICD-10-CM

## 2023-05-15 PROBLEM — O46.8X9 SUBCHORIONIC HEMORRHAGE: Status: RESOLVED | Noted: 2022-12-13 | Resolved: 2023-05-15

## 2023-05-15 PROBLEM — O41.8X90 SUBCHORIONIC HEMORRHAGE: Status: RESOLVED | Noted: 2022-12-13 | Resolved: 2023-05-15

## 2023-05-15 PROCEDURE — 0502F SUBSEQUENT PRENATAL CARE: CPT | Performed by: OBSTETRICS & GYNECOLOGY

## 2023-05-15 RX ORDER — CLOTRIMAZOLE AND BETAMETHASONE DIPROPIONATE 10; .64 MG/G; MG/G
CREAM TOPICAL
COMMUNITY
Start: 2023-05-01

## 2023-05-15 RX ORDER — HYDROXYZINE HYDROCHLORIDE 25 MG/1
TABLET, FILM COATED ORAL
COMMUNITY
Start: 2023-04-10

## 2023-05-15 RX ORDER — ASCORBIC ACID, CHOLECALCIFEROL, .ALPHA.-TOCOPHEROL, PYRIDOXINE, FOLIC ACID, CYANOCOBALAMIN, CALCIUM CORBONATE, FERROUS FUMARATE, POTASSIUM IODIDE, MAGNESIUM, DOCONEXENT, ICOSAPENT 85; 200; 10; 25; 1; 12; 250; 140; 28; 150; 45; 300; 40 MG/1; [IU]/1; [IU]/1; MG/1; MG/1; UG/1; UG/1; MG/1; MG/1; UG/1; MG/1; MG/1; MG/1
CAPSULE, GELATIN COATED ORAL
COMMUNITY

## 2023-05-15 NOTE — PROGRESS NOTES
OB VISIT      Current EGA:   33w3d    Visit Notes:  Doing Well. +Fetal Movement. No Ucs. No LOF or VB. Having carpal tunnel syndrome   Total weight gain is 24 lb 9.6 oz (11.2 kg). (Total expected for pregnancy is 25 lb (11.5 kg)-35 lb (16 kg))    Problem List:  Dating LMP = 11wk US  AMA - NIPT WNL - Baby ASA  Boy - Brittani Bowman multip  Hypothyroid - seeing Endocrine ( normal labs)  Subchorionic hemorrhage resolved  Polyhydramnios (declines Glucose testing)     2023 Problems (from 22 to present)       Problem Noted Resolved    Polyhydramnios in third trimester 2023 by Turner Brown MD No    Overview Signed 2023  3:58 PM by Turner Brown MD     32w 252  Declines FS glucose screening         AMA (advanced maternal age) multigravida 35+ 2023 by [place], Convprob No    Subchorionic hemorrhage 2022 by [place], Convprob No    Overview Signed 1/10/2023  4:41 PM by [place] Convprob     2.1 cm           Grand multiparity with current pregnancy 2016 by [place] Convprob No    Hypothyroid 11/15/2013 by [place], Convprob No    Overview Signed 3/19/2022  8:51 AM by [place] Convprob     Inconsistent use during all pregnancies                 Visit Diagnoses:   Diagnosis Orders   1. 33 weeks gestation of pregnancy        2. Antepartum multigravida of advanced maternal age        1. Subchorionic hemorrhage of placenta in third trimester, single or unspecified fetus        4. [address] multiparity with current pregnancy, antepartum        5. Hypothyroidism, unspecified type        6. Polyhydramnios in third trimester complication, single or unspecified fetus          Follow Up:  Return in about 2 weeks (around 2023) for Routine OB.     Cora Barbour MD  05/15/23  4:25 PM

## 2023-05-17 ENCOUNTER — HOSPITAL ENCOUNTER (OUTPATIENT)
Facility: HOSPITAL | Age: 43
Discharge: HOME | End: 2023-05-20
Payer: MEDICAID

## 2023-05-17 PROCEDURE — 76819 FETAL BIOPHYS PROFIL W/O NST: CPT | Performed by: OBSTETRICS & GYNECOLOGY

## 2023-05-17 NOTE — PROCEDURES
Indication  ========    Advance Maternal Age  Hypothyroidism  Grandmultipara    Method  ======    Transabdominal ultrasound examination. View: Sufficient    Pregnancy  =========    Mccann pregnancy. Number of fetuses: 1    Dating  ======    LMP on: 2022  GA by LMP 35 w + 5 d  AGATA by LMP: 2023  Previous Ultrasound on: 2022  Type of prior assessment: GA  GA at prior assessment date 12 w + 2 d  GA by previous U/S 29 w + 3 d  AGATA by previous Ultrasound: 2023  Assigned: based on the LMP, selected on 2023  Assigned GA 33 w + 5 d  Assigned AGATA: 2023    General Evaluation  ==============    Cardiac activity present.  bpm. Fetal movements: visualized. Presentation: cephalic  Placenta: anterior  Umbilical cord: Cord vessels: 3 vessel cord    Amniotic Fluid Assessment  =====================    Amount of AF: normal amount  MVP 8.4 cm. GILBERTO 17.1 cm. Q1 8.4 cm, Q2 0.9 cm, Q3 3.5 cm, Q4 4.4 cm    Biophysical Profile  ==============    2: Fetal breathing movements  2: Gross body movements  2: Fetal tone  2: Amniotic fluid volume   Biophysical profile score    Findings  =======    Biophysical Profile without NST ([id number])    Please see biophysical profile score noted above. Fetal movement and fluid volume appear normal.    Amniotic fluid is normal; however maximum vertical pocket is elevated at 8.4 cm. This is an improvement from last week. Plan of Care  ==========    Adwoa Sandoval is a 41 yo [id number] with the following issues:    Advanced maternal age  -cffDNA was low-risk.  -Due to age over 36, weekly  testing is recommended beginning at 42 weeks gestation. Polyhydramnios/AC = 95th%  - Continue to monitor amniotic fluid levels. Follow-up  ========    Follow up in 1 week for  surveillance.     Coding  ======    Code: O09.523  Description: Supervision of elderly multigravida  Code: O99.283  Description: Other endocrine, nutritional and metabolic diseases complicating

## 2023-05-22 ENCOUNTER — NURSE ONLY (OUTPATIENT)
Age: 43
End: 2023-05-22

## 2023-05-22 DIAGNOSIS — E03.9 ACQUIRED HYPOTHYROIDISM: Primary | ICD-10-CM

## 2023-05-22 DIAGNOSIS — E03.9 ACQUIRED HYPOTHYROIDISM: ICD-10-CM

## 2023-05-23 LAB
ALBUMIN SERPL-MCNC: 3.4 G/DL (ref 3.8–4.8)
ALBUMIN/CREAT UR: 6 MG/G CREAT (ref 0–29)
ALBUMIN/GLOB SERPL: 1.3 {RATIO} (ref 1.2–2.2)
ALP SERPL-CCNC: 150 IU/L (ref 44–121)
ALT SERPL-CCNC: 20 IU/L (ref 0–32)
AST SERPL-CCNC: 28 IU/L (ref 0–40)
BILIRUB SERPL-MCNC: 0.4 MG/DL (ref 0–1.2)
BUN SERPL-MCNC: 4 MG/DL (ref 6–24)
BUN/CREAT SERPL: 8 (ref 9–23)
CALCIUM SERPL-MCNC: 8.5 MG/DL (ref 8.7–10.2)
CHLORIDE SERPL-SCNC: 101 MMOL/L (ref 96–106)
CHOLEST SERPL-MCNC: 282 MG/DL (ref 100–199)
CO2 SERPL-SCNC: 22 MMOL/L (ref 20–29)
CREAT SERPL-MCNC: 0.51 MG/DL (ref 0.57–1)
CREAT UR-MCNC: 86.4 MG/DL
EGFRCR SERPLBLD CKD-EPI 2021: 120 ML/MIN/1.73
GLOBULIN SER CALC-MCNC: 2.7 G/DL (ref 1.5–4.5)
GLUCOSE SERPL-MCNC: 90 MG/DL (ref 70–99)
HBA1C MFR BLD: 5.6 % (ref 4.8–5.6)
HDLC SERPL-MCNC: 35 MG/DL
IMP & REVIEW OF LAB RESULTS: NORMAL
LDLC SERPL CALC-MCNC: 187 MG/DL (ref 0–99)
MICROALBUMIN UR-MCNC: 5.3 UG/ML
POTASSIUM SERPL-SCNC: 3.5 MMOL/L (ref 3.5–5.2)
PROT SERPL-MCNC: 6.1 G/DL (ref 6–8.5)
SODIUM SERPL-SCNC: 136 MMOL/L (ref 134–144)
T4 SERPL-MCNC: 12.7 UG/DL (ref 4.5–12)
TRIGL SERPL-MCNC: 304 MG/DL (ref 0–149)
TSH SERPL DL<=0.005 MIU/L-ACNC: 2.86 UIU/ML (ref 0.45–4.5)
VLDLC SERPL CALC-MCNC: 60 MG/DL (ref 5–40)

## 2023-05-24 ENCOUNTER — HOSPITAL ENCOUNTER (OUTPATIENT)
Facility: HOSPITAL | Age: 43
Discharge: HOME | End: 2023-05-27
Payer: MEDICAID

## 2023-05-24 PROCEDURE — 76819 FETAL BIOPHYS PROFIL W/O NST: CPT

## 2023-05-25 NOTE — PROCEDURES
Indication  ========    Advance Maternal Age  Hypothyroidism  Grandmultipara    Method  ======    Transabdominal ultrasound examination. View: Good view    Pregnancy  =========    Mccann pregnancy. Number of fetuses: 1    Dating  ======    LMP on: 2022  GA by LMP 29 w + 5 d  AGATA by LMP: 2023  Previous Ultrasound on: 2022  Type of prior assessment: GA  GA at prior assessment date 12 w + 2 d  GA by previous U/S 28 w + 3 d  AGATA by previous Ultrasound: 2023  Assigned: based on the LMP, selected on 2023  Assigned GA 34 w + 5 d  Assigned AGATA: 2023    General Evaluation  ==============    Cardiac activity present.  bpm. Fetal movements: visualized. Presentation: cephalic  Placenta: anterior  Umbilical cord: Cord vessels: 3 vessel cord    Amniotic Fluid Assessment  =====================    Amount of AF: normal amount  MVP 8.8 cm. GILBERTO 20.1 cm. Q1 8.8 cm, Q2 6.7 cm, Q3 0.0 cm, Q4 4.6 cm    Biophysical Profile  ==============    2: Fetal breathing movements  2: Gross body movements  2: Fetal tone  2: Amniotic fluid volume   Biophysical profile score    Findings  =======    Biophysical Profile without NST ([id number])    Please see biophysical profile score noted above. There is mild polyhydramnios with MVP = 8.8cm. Plan of Care  ==========    Broderick is a 39 yo [id number] with the following issues:    Advanced maternal age  -cffDNA was low-risk.  -Due to age over 36, weekly  testing is recommended beginning at 42 weeks gestation. Polyhydramnios/AC = 95th%  - 4/10/23 GCT = 74 @ 28 weeks; she had increased her carb intake significantly at the time of dx of poly but states she has cut down since then  -kick count instructions reviewed        Patient was counseled on the findings. Questions and concerns were addressed.     A total of 15 minutes was spent on this visit reviewing previous notes, counseling the patient and documenting the findings in the

## 2023-05-30 ENCOUNTER — HOSPITAL ENCOUNTER (OUTPATIENT)
Facility: HOSPITAL | Age: 43
Discharge: HOME | End: 2023-06-02
Payer: MEDICAID

## 2023-05-30 ENCOUNTER — ROUTINE PRENATAL (OUTPATIENT)
Age: 43
End: 2023-05-30

## 2023-05-30 VITALS — DIASTOLIC BLOOD PRESSURE: 70 MMHG | BODY MASS INDEX: 27.32 KG/M2 | WEIGHT: 144.6 LBS | SYSTOLIC BLOOD PRESSURE: 122 MMHG

## 2023-05-30 DIAGNOSIS — Z3A.35 35 WEEKS GESTATION OF PREGNANCY: Primary | ICD-10-CM

## 2023-05-30 LAB — GBS, EXTERNAL RESULT: NEGATIVE

## 2023-05-30 PROCEDURE — 0502F SUBSEQUENT PRENATAL CARE: CPT | Performed by: OBSTETRICS & GYNECOLOGY

## 2023-05-30 PROCEDURE — 76819 FETAL BIOPHYS PROFIL W/O NST: CPT | Performed by: OBSTETRICS & GYNECOLOGY

## 2023-05-30 NOTE — PROCEDURES
Indication  ========    Advance Maternal Age  Hypothyroidism  Grandmultipara    Method  ======    Transabdominal ultrasound examination. View: Suboptimal view: limited by maternal body habitus    Pregnancy  =========    Mccann pregnancy. Number of fetuses: 1    Dating  ======    LMP on: 2022  GA by LMP 28 w + 4 d  AGATA by LMP: 2023  Previous Ultrasound on: 2022  Type of prior assessment: GA  GA at prior assessment date 12 w + 2 d  GA by previous U/S 39 w + 2 d  AGATA by previous Ultrasound: 2023  Assigned: based on the LMP, selected on 2023  Assigned GA 35 w + 4 d  Assigned AGATA: 2023    General Evaluation  ==============    Cardiac activity present.  bpm. Fetal movements: visualized. Presentation: cephalic  Placenta: anterior  Umbilical cord: Cord vessels: 3 vessel cord    Amniotic Fluid Assessment  =====================    MVP 8.4 cm. GILBERTO 26.8 cm. Q1 6.4 cm, Q2 5.3 cm, Q3 8.4 cm, Q4 6.8 cm    Biophysical Profile  ==============    2: Fetal breathing movements  2: Gross body movements  2: Fetal tone  2: Amniotic fluid volume   Biophysical profile score    Findings  =======    Biophysical Profile without NST ([id number])    Please see biophysical profile score noted above. There is mild polyhydramnios with MVP = 8.4cm. Plan of Care  ==========    Alcon Whiteside is a 39 yo [id number] with the following issues:    Advanced maternal age  -cffDNA was low-risk.  -Due to age over 36, weekly  testing is recommended beginning at 42 weeks gestation. Polyhydramnios/AC = 95th%  - 4/10/23 GCT = 74 @ 28 weeks; she had increased her carb intake significantly at the time of dx of poly but states she has cut down since then  -kick count instructions reviewed        Patient was counseled on the findings. Questions and concerns were addressed.     A total of 15 minutes was spent on this visit reviewing previous notes, counseling the patient and documenting the findings in the

## 2023-05-30 NOTE — PROGRESS NOTES
OB VISIT      Current EGA:   35w4d    Visit Notes:  Doing Well. +Fetal Movement. No Ucs. No LOF or VB. Went to [facility] today. Fluid 268. BPP 8/8  Total weight gain is 25 lb 9.6 oz (11.6 kg). (Total expected for pregnancy is 25 lb (11.5 kg)-35 lb (16 kg))    Problem List:  Dating LMP = 11wk US  AMA - NIPT WNL - Baby ASA  Boy - Nile Brandon multip  Hypothyroid - seeing Endocrine (1/20 normal labs)  Subchorionic hemorrhage resolved  Polyhydramnios (declines Glucose testing)  US [id number] 66%    Visit Diagnoses:   Diagnosis Orders   1. 35 weeks gestation of pregnancy  Group B Strep by CUONG        Follow Up:  Return in about 1 week (around 6/6/2023) for Routine OB.     Ivan Li MD  05/30/23  4:25 PM

## 2023-06-01 LAB — GP B STREP DNA SPEC QL NAA+PROBE: NEGATIVE

## 2023-06-06 ENCOUNTER — ROUTINE PRENATAL (OUTPATIENT)
Age: 43
End: 2023-06-06

## 2023-06-06 ENCOUNTER — HOSPITAL ENCOUNTER (OUTPATIENT)
Facility: HOSPITAL | Age: 43
Discharge: HOME | End: 2023-06-09
Payer: MEDICAID

## 2023-06-06 VITALS — BODY MASS INDEX: 27.13 KG/M2 | SYSTOLIC BLOOD PRESSURE: 120 MMHG | DIASTOLIC BLOOD PRESSURE: 74 MMHG | WEIGHT: 143.6 LBS

## 2023-06-06 DIAGNOSIS — Z3A.36 36 WEEKS GESTATION OF PREGNANCY: Primary | ICD-10-CM

## 2023-06-06 PROCEDURE — 76816 OB US FOLLOW-UP PER FETUS: CPT

## 2023-06-06 PROCEDURE — 76819 FETAL BIOPHYS PROFIL W/O NST: CPT

## 2023-06-06 PROCEDURE — 0502F SUBSEQUENT PRENATAL CARE: CPT | Performed by: OBSTETRICS & GYNECOLOGY

## 2023-06-06 NOTE — PROGRESS NOTES
OB VISIT      Current EGA:   36w4d    Visit Notes:  Doing Well. +Fetal Movement. No Ucs. No LOF or VB. [facility] Growth 75%  GILBERTO was 21.6    Total weight gain is 24 lb 9.6 oz (11.2 kg). (Total expected fo  pregnancy is 25 lb (11.5 kg)-35 lb (16 kg))    Vitals:    06/06/23 1425   BP: 120/74     Problem List:  Dating LMP = 11wk US  AMA - NIPT WNL - Baby ASA  Boy - Lloyd  Grand multip  Hypothyroid - seeing Endocrine (1/20 normal labs)  Subchorionic hemorrhage resolved  Polyhydramnios (declines Glucose testing)  Resolved 36w  US 53y3y=64s3h 66%  US 46g9s=98h9y 75%      Current Outpatient Medications   Medication Instructions    ascorbic acid (VITAMIN C) 250 mg, Oral, PRN    clotrimazole-betamethasone (LOTRISONE) 1-0.05 % cream APPLY TO AFFECTED AREAS EXTERNALLY TWICE DAILY    Ferrous Sulfate Dried (FERROUS SULFATE IRON PO) Oral    fluocinolone (DERMOTIC) 0.01 % OIL oil 4 drops to each ear as needed for itching    hydrOXYzine HCl (ATARAX) 25 MG tablet TAKE 1 TABLET BY MOUTH THREE TIMES DAILY AS NEEDED FOR ITCHING FOR UP TO FOR 30 DAYS    levothyroxine (UNITHROID) 88 MCG tablet 1 tablet Monday through Thursday, 2 tablets on Friday Saturday and Sunday Brand medically necessary    Prenat w/o G-BA-Rfcom-FA-Omega (PNV-OMEGA) 28-0.6-0.4-340 MG CAPS Oral        Visit Diagnoses:   Diagnosis Orders   1. 36 weeks gestation of pregnancy            Follow Up:  Return in about 1 week (around 6/13/2023) for Routine OB.     Francesco Nicholson MD, [address] [facility]  06/06/23  2:36 PM

## 2023-06-20 ENCOUNTER — HOSPITAL ENCOUNTER (OUTPATIENT)
Facility: HOSPITAL | Age: 43
Discharge: HOME | End: 2023-06-23
Payer: MEDICAID

## 2023-06-20 PROCEDURE — 76819 FETAL BIOPHYS PROFIL W/O NST: CPT

## 2023-06-20 NOTE — PROCEDURES
and metabolic diseases complicating pregnancy  Code: 71321  Description: Fetal biophysical profile; without non-stress testing

## 2023-06-26 ENCOUNTER — HOSPITAL ENCOUNTER (OUTPATIENT)
Facility: HOSPITAL | Age: 43
Discharge: HOME | End: 2023-06-29
Payer: MEDICAID

## 2023-06-26 ENCOUNTER — HOSPITAL ENCOUNTER (INPATIENT)
Facility: HOSPITAL | Age: 43
LOS: 4 days | Discharge: HOME | DRG: 540 | End: 2023-06-30
Attending: OBSTETRICS & GYNECOLOGY | Admitting: OBSTETRICS & GYNECOLOGY
Payer: MEDICAID

## 2023-06-26 PROBLEM — O09.529 AMA (ADVANCED MATERNAL AGE) MULTIGRAVIDA 35+: Status: ACTIVE | Noted: 2023-01-08

## 2023-06-26 PROBLEM — O14.93 PRE-ECLAMPSIA IN THIRD TRIMESTER: Status: ACTIVE | Noted: 2023-06-26

## 2023-06-26 PROBLEM — O99.019 ANEMIA DURING PREGNANCY: Status: ACTIVE | Noted: 2023-06-26

## 2023-06-26 PROBLEM — Z3A.39 39 WEEKS GESTATION OF PREGNANCY: Status: ACTIVE | Noted: 2023-06-26

## 2023-06-26 LAB
ALBUMIN SERPL-MCNC: 2.7 G/DL (ref 3.5–5)
ALBUMIN/GLOB SERPL: 0.6 (ref 1.1–2.2)
ALP SERPL-CCNC: 220 U/L (ref 45–117)
ALT SERPL-CCNC: 25 U/L (ref 12–78)
ANION GAP SERPL CALC-SCNC: 7 MMOL/L (ref 5–15)
AST SERPL-CCNC: 32 U/L (ref 15–37)
BILIRUB SERPL-MCNC: 0.5 MG/DL (ref 0.2–1)
BUN SERPL-MCNC: 6 MG/DL (ref 6–20)
BUN/CREAT SERPL: 10 (ref 12–20)
CALCIUM SERPL-MCNC: 9 MG/DL (ref 8.5–10.1)
CHLORIDE SERPL-SCNC: 106 MMOL/L (ref 97–108)
CO2 SERPL-SCNC: 23 MMOL/L (ref 21–32)
CREAT SERPL-MCNC: 0.6 MG/DL (ref 0.55–1.02)
CREAT UR-MCNC: 34 MG/DL
ERYTHROCYTE [DISTWIDTH] IN BLOOD BY AUTOMATED COUNT: 16.9 % (ref 11.5–14.5)
GLOBULIN SER CALC-MCNC: 4.2 G/DL (ref 2–4)
GLUCOSE SERPL-MCNC: 71 MG/DL (ref 65–100)
HCT VFR BLD AUTO: 35 % (ref 35–47)
HGB BLD-MCNC: 10.9 G/DL (ref 11.5–16)
MCH RBC QN AUTO: 24.5 PG (ref 26–34)
MCHC RBC AUTO-ENTMCNC: 31.1 G/DL (ref 30–36.5)
MCV RBC AUTO: 78.7 FL (ref 80–99)
NRBC # BLD: 0 K/UL (ref 0–0.01)
NRBC BLD-RTO: 0 PER 100 WBC
PLATELET # BLD AUTO: 308 K/UL (ref 150–400)
PMV BLD AUTO: 10.4 FL (ref 8.9–12.9)
POTASSIUM SERPL-SCNC: 3.7 MMOL/L (ref 3.5–5.1)
PROT SERPL-MCNC: 6.9 G/DL (ref 6.4–8.2)
PROT UR-MCNC: 10 MG/DL (ref 0–11.9)
PROT/CREAT UR-RTO: 0.3
RBC # BLD AUTO: 4.45 M/UL (ref 3.8–5.2)
SODIUM SERPL-SCNC: 136 MMOL/L (ref 136–145)
WBC # BLD AUTO: 10.9 K/UL (ref 3.6–11)

## 2023-06-26 PROCEDURE — 86901 BLOOD TYPING SEROLOGIC RH(D): CPT

## 2023-06-26 PROCEDURE — 76819 FETAL BIOPHYS PROFIL W/O NST: CPT

## 2023-06-26 PROCEDURE — 86850 RBC ANTIBODY SCREEN: CPT

## 2023-06-26 PROCEDURE — 36415 COLL VENOUS BLD VENIPUNCTURE: CPT

## 2023-06-26 PROCEDURE — 1100000000 HC RM PRIVATE

## 2023-06-26 PROCEDURE — 85027 COMPLETE CBC AUTOMATED: CPT

## 2023-06-26 PROCEDURE — 86920 COMPATIBILITY TEST SPIN: CPT

## 2023-06-26 PROCEDURE — 84156 ASSAY OF PROTEIN URINE: CPT

## 2023-06-26 PROCEDURE — 76816 OB US FOLLOW-UP PER FETUS: CPT

## 2023-06-26 PROCEDURE — 80053 COMPREHEN METABOLIC PANEL: CPT

## 2023-06-26 PROCEDURE — 82570 ASSAY OF URINE CREATININE: CPT

## 2023-06-26 PROCEDURE — 94761 N-INVAS EAR/PLS OXIMETRY MLT: CPT

## 2023-06-26 PROCEDURE — 86900 BLOOD TYPING SEROLOGIC ABO: CPT

## 2023-06-26 RX ORDER — FAMOTIDINE 20 MG/1
20 TABLET, FILM COATED ORAL 2 TIMES DAILY
Status: DISCONTINUED | OUTPATIENT
Start: 2023-06-26 | End: 2023-06-27

## 2023-06-26 RX ORDER — TRANEXAMIC ACID 10 MG/ML
1000 INJECTION, SOLUTION INTRAVENOUS
Status: DISCONTINUED | OUTPATIENT
Start: 2023-06-26 | End: 2023-06-27

## 2023-06-26 RX ORDER — SODIUM CHLORIDE, SODIUM LACTATE, POTASSIUM CHLORIDE, AND CALCIUM CHLORIDE .6; .31; .03; .02 G/100ML; G/100ML; G/100ML; G/100ML
1000 INJECTION, SOLUTION INTRAVENOUS PRN
Status: DISCONTINUED | OUTPATIENT
Start: 2023-06-26 | End: 2023-06-27

## 2023-06-26 RX ORDER — ONDANSETRON 2 MG/ML
4 INJECTION INTRAMUSCULAR; INTRAVENOUS EVERY 6 HOURS PRN
Status: DISCONTINUED | OUTPATIENT
Start: 2023-06-26 | End: 2023-06-27

## 2023-06-26 RX ORDER — SODIUM CHLORIDE, SODIUM LACTATE, POTASSIUM CHLORIDE, CALCIUM CHLORIDE 600; 310; 30; 20 MG/100ML; MG/100ML; MG/100ML; MG/100ML
INJECTION, SOLUTION INTRAVENOUS CONTINUOUS
Status: DISCONTINUED | OUTPATIENT
Start: 2023-06-26 | End: 2023-06-27

## 2023-06-26 RX ORDER — SODIUM CHLORIDE 0.9 % (FLUSH) 0.9 %
5-40 SYRINGE (ML) INJECTION PRN
Status: DISCONTINUED | OUTPATIENT
Start: 2023-06-26 | End: 2023-06-27

## 2023-06-26 RX ORDER — MISOPROSTOL 200 UG/1
800 TABLET ORAL PRN
Status: DISCONTINUED | OUTPATIENT
Start: 2023-06-26 | End: 2023-06-27

## 2023-06-26 RX ORDER — SODIUM CHLORIDE, SODIUM LACTATE, POTASSIUM CHLORIDE, AND CALCIUM CHLORIDE .6; .31; .03; .02 G/100ML; G/100ML; G/100ML; G/100ML
500 INJECTION, SOLUTION INTRAVENOUS PRN
Status: DISCONTINUED | OUTPATIENT
Start: 2023-06-26 | End: 2023-06-27

## 2023-06-26 RX ORDER — METHYLERGONOVINE MALEATE 0.2 MG/ML
200 INJECTION INTRAVENOUS PRN
Status: DISCONTINUED | OUTPATIENT
Start: 2023-06-26 | End: 2023-06-27

## 2023-06-26 RX ORDER — SODIUM CHLORIDE 9 MG/ML
25 INJECTION, SOLUTION INTRAVENOUS PRN
Status: DISCONTINUED | OUTPATIENT
Start: 2023-06-26 | End: 2023-06-27

## 2023-06-26 RX ORDER — SODIUM CHLORIDE 0.9 % (FLUSH) 0.9 %
5-40 SYRINGE (ML) INJECTION EVERY 12 HOURS SCHEDULED
Status: DISCONTINUED | OUTPATIENT
Start: 2023-06-26 | End: 2023-06-27

## 2023-06-26 RX ORDER — CARBOPROST TROMETHAMINE 250 UG/ML
250 INJECTION, SOLUTION INTRAMUSCULAR PRN
Status: DISCONTINUED | OUTPATIENT
Start: 2023-06-26 | End: 2023-06-27

## 2023-06-26 RX ORDER — DOCUSATE SODIUM 100 MG/1
100 CAPSULE, LIQUID FILLED ORAL 2 TIMES DAILY
Status: DISCONTINUED | OUTPATIENT
Start: 2023-06-26 | End: 2023-06-27

## 2023-06-27 ENCOUNTER — ANESTHESIA EVENT (OUTPATIENT)
Facility: HOSPITAL | Age: 43
End: 2023-06-27
Payer: MEDICAID

## 2023-06-27 ENCOUNTER — ANESTHESIA (OUTPATIENT)
Facility: HOSPITAL | Age: 43
End: 2023-06-27
Payer: MEDICAID

## 2023-06-27 LAB
ABO + RH BLD: NORMAL
ALBUMIN SERPL-MCNC: 2.1 G/DL (ref 3.5–5)
ALBUMIN/GLOB SERPL: 0.6 (ref 1.1–2.2)
ALP SERPL-CCNC: 171 U/L (ref 45–117)
ALT SERPL-CCNC: 20 U/L (ref 12–78)
ANION GAP SERPL CALC-SCNC: 6 MMOL/L (ref 5–15)
AST SERPL-CCNC: 36 U/L (ref 15–37)
BILIRUB SERPL-MCNC: 0.6 MG/DL (ref 0.2–1)
BLD PROD TYP BPU: NORMAL
BLD PROD TYP BPU: NORMAL
BLOOD BANK DISPENSE STATUS: NORMAL
BLOOD BANK DISPENSE STATUS: NORMAL
BLOOD GROUP ANTIBODIES SERPL: NORMAL
BPU ID: NORMAL
BPU ID: NORMAL
BUN SERPL-MCNC: 8 MG/DL (ref 6–20)
BUN/CREAT SERPL: 16 (ref 12–20)
CALCIUM SERPL-MCNC: 8.4 MG/DL (ref 8.5–10.1)
CHLORIDE SERPL-SCNC: 107 MMOL/L (ref 97–108)
CO2 SERPL-SCNC: 25 MMOL/L (ref 21–32)
CREAT SERPL-MCNC: 0.49 MG/DL (ref 0.55–1.02)
CROSSMATCH RESULT: NORMAL
CROSSMATCH RESULT: NORMAL
ERYTHROCYTE [DISTWIDTH] IN BLOOD BY AUTOMATED COUNT: 17.1 % (ref 11.5–14.5)
GLOBULIN SER CALC-MCNC: 3.3 G/DL (ref 2–4)
GLUCOSE SERPL-MCNC: 99 MG/DL (ref 65–100)
HCT VFR BLD AUTO: 31.8 % (ref 35–47)
HGB BLD-MCNC: 9.6 G/DL (ref 11.5–16)
HISTORY CHECK: NORMAL
MCH RBC QN AUTO: 24.3 PG (ref 26–34)
MCHC RBC AUTO-ENTMCNC: 30.2 G/DL (ref 30–36.5)
MCV RBC AUTO: 80.5 FL (ref 80–99)
NRBC # BLD: 0 K/UL (ref 0–0.01)
NRBC BLD-RTO: 0 PER 100 WBC
PLATELET # BLD AUTO: 278 K/UL (ref 150–400)
PMV BLD AUTO: 10.6 FL (ref 8.9–12.9)
POTASSIUM SERPL-SCNC: 4 MMOL/L (ref 3.5–5.1)
PROT SERPL-MCNC: 5.4 G/DL (ref 6.4–8.2)
RBC # BLD AUTO: 3.95 M/UL (ref 3.8–5.2)
SODIUM SERPL-SCNC: 138 MMOL/L (ref 136–145)
SPECIMEN EXP DATE BLD: NORMAL
UNIT DIVISION: 0
UNIT DIVISION: 0
WBC # BLD AUTO: 13.5 K/UL (ref 3.6–11)

## 2023-06-27 PROCEDURE — 6370000000 HC RX 637 (ALT 250 FOR IP): Performed by: OBSTETRICS & GYNECOLOGY

## 2023-06-27 PROCEDURE — 2580000003 HC RX 258: Performed by: OBSTETRICS & GYNECOLOGY

## 2023-06-27 PROCEDURE — 2500000003 HC RX 250 WO HCPCS: Performed by: NURSE ANESTHETIST, CERTIFIED REGISTERED

## 2023-06-27 PROCEDURE — 3609079900 HC CESAREAN SECTION: Performed by: OBSTETRICS & GYNECOLOGY

## 2023-06-27 PROCEDURE — 7100000001 HC PACU RECOVERY - ADDTL 15 MIN: Performed by: OBSTETRICS & GYNECOLOGY

## 2023-06-27 PROCEDURE — 3700000000 HC ANESTHESIA ATTENDED CARE: Performed by: OBSTETRICS & GYNECOLOGY

## 2023-06-27 PROCEDURE — 36415 COLL VENOUS BLD VENIPUNCTURE: CPT

## 2023-06-27 PROCEDURE — 3700000001 HC ADD 15 MINUTES (ANESTHESIA): Performed by: OBSTETRICS & GYNECOLOGY

## 2023-06-27 PROCEDURE — 6360000002 HC RX W HCPCS: Performed by: NURSE ANESTHETIST, CERTIFIED REGISTERED

## 2023-06-27 PROCEDURE — 6360000002 HC RX W HCPCS: Performed by: OBSTETRICS & GYNECOLOGY

## 2023-06-27 PROCEDURE — 1100000000 HC RM PRIVATE

## 2023-06-27 PROCEDURE — 51702 INSERT TEMP BLADDER CATH: CPT

## 2023-06-27 PROCEDURE — 7100000000 HC PACU RECOVERY - FIRST 15 MIN: Performed by: OBSTETRICS & GYNECOLOGY

## 2023-06-27 PROCEDURE — 85027 COMPLETE CBC AUTOMATED: CPT

## 2023-06-27 PROCEDURE — 80053 COMPREHEN METABOLIC PANEL: CPT

## 2023-06-27 PROCEDURE — 2709999900 HC NON-CHARGEABLE SUPPLY: Performed by: OBSTETRICS & GYNECOLOGY

## 2023-06-27 RX ORDER — FAMOTIDINE 10 MG/ML
INJECTION, SOLUTION INTRAVENOUS PRN
Status: DISCONTINUED | OUTPATIENT
Start: 2023-06-27 | End: 2023-06-27 | Stop reason: SDUPTHER

## 2023-06-27 RX ORDER — PHENYLEPHRINE HCL IN 0.9% NACL 0.4MG/10ML
SYRINGE (ML) INTRAVENOUS PRN
Status: DISCONTINUED | OUTPATIENT
Start: 2023-06-27 | End: 2023-06-27 | Stop reason: SDUPTHER

## 2023-06-27 RX ORDER — SODIUM CHLORIDE, SODIUM LACTATE, POTASSIUM CHLORIDE, AND CALCIUM CHLORIDE .6; .31; .03; .02 G/100ML; G/100ML; G/100ML; G/100ML
1000 INJECTION, SOLUTION INTRAVENOUS ONCE
Status: COMPLETED | OUTPATIENT
Start: 2023-06-27 | End: 2023-06-27

## 2023-06-27 RX ORDER — OXYCODONE HYDROCHLORIDE 5 MG/1
5 TABLET ORAL EVERY 4 HOURS PRN
Status: DISCONTINUED | OUTPATIENT
Start: 2023-06-27 | End: 2023-06-30 | Stop reason: HOSPADM

## 2023-06-27 RX ORDER — TRANEXAMIC ACID 10 MG/ML
1000 INJECTION, SOLUTION INTRAVENOUS ONCE
Status: DISCONTINUED | OUTPATIENT
Start: 2023-06-27 | End: 2023-06-27

## 2023-06-27 RX ORDER — SODIUM CHLORIDE, SODIUM LACTATE, POTASSIUM CHLORIDE, CALCIUM CHLORIDE 600; 310; 30; 20 MG/100ML; MG/100ML; MG/100ML; MG/100ML
INJECTION, SOLUTION INTRAVENOUS CONTINUOUS
Status: DISCONTINUED | OUTPATIENT
Start: 2023-06-27 | End: 2023-06-28

## 2023-06-27 RX ORDER — PROCHLORPERAZINE EDISYLATE 5 MG/ML
10 INJECTION INTRAMUSCULAR; INTRAVENOUS EVERY 6 HOURS PRN
Status: DISCONTINUED | OUTPATIENT
Start: 2023-06-27 | End: 2023-06-30 | Stop reason: HOSPADM

## 2023-06-27 RX ORDER — DIPHENHYDRAMINE HYDROCHLORIDE 50 MG/ML
25 INJECTION INTRAMUSCULAR; INTRAVENOUS EVERY 6 HOURS PRN
Status: DISCONTINUED | OUTPATIENT
Start: 2023-06-27 | End: 2023-06-30 | Stop reason: HOSPADM

## 2023-06-27 RX ORDER — ONDANSETRON 2 MG/ML
INJECTION INTRAMUSCULAR; INTRAVENOUS PRN
Status: DISCONTINUED | OUTPATIENT
Start: 2023-06-27 | End: 2023-06-27 | Stop reason: SDUPTHER

## 2023-06-27 RX ORDER — MISOPROSTOL 200 UG/1
200 TABLET ORAL PRN
Status: DISCONTINUED | OUTPATIENT
Start: 2023-06-27 | End: 2023-06-30 | Stop reason: HOSPADM

## 2023-06-27 RX ORDER — ONDANSETRON 2 MG/ML
4 INJECTION INTRAMUSCULAR; INTRAVENOUS EVERY 6 HOURS PRN
Status: DISCONTINUED | OUTPATIENT
Start: 2023-06-27 | End: 2023-06-30 | Stop reason: HOSPADM

## 2023-06-27 RX ORDER — LEVOTHYROXINE SODIUM 88 UG/1
88 TABLET ORAL DAILY
Status: DISCONTINUED | OUTPATIENT
Start: 2023-06-27 | End: 2023-06-30 | Stop reason: HOSPADM

## 2023-06-27 RX ORDER — FENTANYL CITRATE 50 UG/ML
INJECTION, SOLUTION INTRAMUSCULAR; INTRAVENOUS PRN
Status: DISCONTINUED | OUTPATIENT
Start: 2023-06-27 | End: 2023-06-27 | Stop reason: SDUPTHER

## 2023-06-27 RX ORDER — LANOLIN/MINERAL OIL
LOTION (ML) TOPICAL
Status: DISCONTINUED | OUTPATIENT
Start: 2023-06-27 | End: 2023-06-30 | Stop reason: HOSPADM

## 2023-06-27 RX ORDER — TRANEXAMIC ACID 100 MG/ML
INJECTION, SOLUTION INTRAVENOUS PRN
Status: DISCONTINUED | OUTPATIENT
Start: 2023-06-27 | End: 2023-06-27 | Stop reason: SDUPTHER

## 2023-06-27 RX ORDER — SODIUM CHLORIDE 9 MG/ML
INJECTION, SOLUTION INTRAVENOUS PRN
Status: DISCONTINUED | OUTPATIENT
Start: 2023-06-27 | End: 2023-06-30 | Stop reason: HOSPADM

## 2023-06-27 RX ORDER — SODIUM CHLORIDE 0.9 % (FLUSH) 0.9 %
5-40 SYRINGE (ML) INJECTION PRN
Status: DISCONTINUED | OUTPATIENT
Start: 2023-06-27 | End: 2023-06-30 | Stop reason: HOSPADM

## 2023-06-27 RX ORDER — BUPIVACAINE HYDROCHLORIDE 7.5 MG/ML
INJECTION, SOLUTION EPIDURAL; RETROBULBAR PRN
Status: DISCONTINUED | OUTPATIENT
Start: 2023-06-27 | End: 2023-06-27 | Stop reason: SDUPTHER

## 2023-06-27 RX ORDER — DOCUSATE SODIUM 100 MG/1
100 CAPSULE, LIQUID FILLED ORAL 2 TIMES DAILY
Status: DISCONTINUED | OUTPATIENT
Start: 2023-06-27 | End: 2023-06-30 | Stop reason: HOSPADM

## 2023-06-27 RX ORDER — METHYLERGONOVINE MALEATE 0.2 MG/ML
200 INJECTION INTRAVENOUS PRN
Status: DISCONTINUED | OUTPATIENT
Start: 2023-06-27 | End: 2023-06-30 | Stop reason: HOSPADM

## 2023-06-27 RX ORDER — SODIUM CHLORIDE 0.9 % (FLUSH) 0.9 %
5-40 SYRINGE (ML) INJECTION EVERY 12 HOURS SCHEDULED
Status: DISCONTINUED | OUTPATIENT
Start: 2023-06-27 | End: 2023-06-28

## 2023-06-27 RX ORDER — OXYCODONE HYDROCHLORIDE 5 MG/1
10 TABLET ORAL EVERY 4 HOURS PRN
Status: DISCONTINUED | OUTPATIENT
Start: 2023-06-27 | End: 2023-06-30 | Stop reason: HOSPADM

## 2023-06-27 RX ORDER — EPHEDRINE SULFATE/0.9% NACL/PF 50 MG/5 ML
SYRINGE (ML) INTRAVENOUS PRN
Status: DISCONTINUED | OUTPATIENT
Start: 2023-06-27 | End: 2023-06-27 | Stop reason: SDUPTHER

## 2023-06-27 RX ORDER — TRANEXAMIC ACID 10 MG/ML
1000 INJECTION, SOLUTION INTRAVENOUS
Status: ACTIVE | OUTPATIENT
Start: 2023-06-27 | End: 2023-06-28

## 2023-06-27 RX ORDER — IBUPROFEN 600 MG/1
600 TABLET ORAL EVERY 8 HOURS SCHEDULED
Status: DISCONTINUED | OUTPATIENT
Start: 2023-06-27 | End: 2023-06-30 | Stop reason: HOSPADM

## 2023-06-27 RX ORDER — MORPHINE SULFATE 2 MG/ML
2 INJECTION, SOLUTION INTRAMUSCULAR; INTRAVENOUS
Status: DISCONTINUED | OUTPATIENT
Start: 2023-06-27 | End: 2023-06-30 | Stop reason: HOSPADM

## 2023-06-27 RX ORDER — MORPHINE SULFATE 10 MG/ML
INJECTION, SOLUTION INTRAMUSCULAR; INTRAVENOUS PRN
Status: DISCONTINUED | OUTPATIENT
Start: 2023-06-27 | End: 2023-06-27 | Stop reason: SDUPTHER

## 2023-06-27 RX ORDER — OXYTOCIN 10 [USP'U]/ML
INJECTION, SOLUTION INTRAMUSCULAR; INTRAVENOUS PRN
Status: DISCONTINUED | OUTPATIENT
Start: 2023-06-27 | End: 2023-06-27 | Stop reason: SDUPTHER

## 2023-06-27 RX ORDER — MISOPROSTOL 200 UG/1
800 TABLET ORAL PRN
Status: DISCONTINUED | OUTPATIENT
Start: 2023-06-27 | End: 2023-06-30 | Stop reason: HOSPADM

## 2023-06-27 RX ORDER — ACETAMINOPHEN 500 MG
1000 TABLET ORAL EVERY 8 HOURS SCHEDULED
Status: DISCONTINUED | OUTPATIENT
Start: 2023-06-27 | End: 2023-06-30 | Stop reason: HOSPADM

## 2023-06-27 RX ORDER — KETOROLAC TROMETHAMINE 15 MG/ML
30 INJECTION, SOLUTION INTRAMUSCULAR; INTRAVENOUS EVERY 6 HOURS
Status: DISPENSED | OUTPATIENT
Start: 2023-06-27 | End: 2023-06-28

## 2023-06-27 RX ORDER — NALOXONE HYDROCHLORIDE 0.4 MG/ML
0.4 INJECTION, SOLUTION INTRAMUSCULAR; INTRAVENOUS; SUBCUTANEOUS PRN
Status: DISCONTINUED | OUTPATIENT
Start: 2023-06-27 | End: 2023-06-30 | Stop reason: HOSPADM

## 2023-06-27 RX ORDER — FUROSEMIDE 10 MG/ML
20 INJECTION INTRAMUSCULAR; INTRAVENOUS ONCE
Status: COMPLETED | OUTPATIENT
Start: 2023-06-27 | End: 2023-06-27

## 2023-06-27 RX ADMIN — AZITHROMYCIN DIHYDRATE 500 MG: 500 INJECTION, POWDER, LYOPHILIZED, FOR SOLUTION INTRAVENOUS at 03:15

## 2023-06-27 RX ADMIN — LEVOTHYROXINE SODIUM 88 MCG: 88 TABLET ORAL at 10:24

## 2023-06-27 RX ADMIN — ONDANSETRON HYDROCHLORIDE 4 MG: 2 SOLUTION INTRAMUSCULAR; INTRAVENOUS at 03:01

## 2023-06-27 RX ADMIN — FUROSEMIDE 20 MG: 10 INJECTION, SOLUTION INTRAMUSCULAR; INTRAVENOUS at 19:33

## 2023-06-27 RX ADMIN — SODIUM CHLORIDE, PRESERVATIVE FREE 10 ML: 5 INJECTION INTRAVENOUS at 21:44

## 2023-06-27 RX ADMIN — Medication 80 MCG: at 03:28

## 2023-06-27 RX ADMIN — SODIUM CHLORIDE, POTASSIUM CHLORIDE, SODIUM LACTATE AND CALCIUM CHLORIDE: 600; 310; 30; 20 INJECTION, SOLUTION INTRAVENOUS at 02:59

## 2023-06-27 RX ADMIN — Medication 80 MCG: at 03:15

## 2023-06-27 RX ADMIN — Medication 80 MCG: at 03:50

## 2023-06-27 RX ADMIN — OXYTOCIN 30 UNITS: 10 INJECTION, SOLUTION INTRAMUSCULAR; INTRAVENOUS at 03:31

## 2023-06-27 RX ADMIN — MORPHINE SULFATE 0.2 MG: 10 INJECTION, SOLUTION INTRAMUSCULAR; INTRAVENOUS at 03:09

## 2023-06-27 RX ADMIN — IBUPROFEN 600 MG: 600 TABLET, FILM COATED ORAL at 21:42

## 2023-06-27 RX ADMIN — FAMOTIDINE 20 MG: 10 INJECTION INTRAVENOUS at 03:01

## 2023-06-27 RX ADMIN — PROCHLORPERAZINE EDISYLATE 10 MG: 5 INJECTION INTRAMUSCULAR; INTRAVENOUS at 06:13

## 2023-06-27 RX ADMIN — BUPIVACAINE HYDROCHLORIDE 1.1 ML: 7.5 INJECTION, SOLUTION EPIDURAL; RETROBULBAR at 03:09

## 2023-06-27 RX ADMIN — SODIUM CHLORIDE, POTASSIUM CHLORIDE, SODIUM LACTATE AND CALCIUM CHLORIDE 1000 ML: 600; 310; 30; 20 INJECTION, SOLUTION INTRAVENOUS at 11:17

## 2023-06-27 RX ADMIN — FENTANYL CITRATE 10 MCG: 50 INJECTION, SOLUTION INTRAMUSCULAR; INTRAVENOUS at 03:09

## 2023-06-27 RX ADMIN — SODIUM CHLORIDE, POTASSIUM CHLORIDE, SODIUM LACTATE AND CALCIUM CHLORIDE 1000 ML: 600; 310; 30; 20 INJECTION, SOLUTION INTRAVENOUS at 16:07

## 2023-06-27 RX ADMIN — Medication 10 MG: at 03:21

## 2023-06-27 RX ADMIN — CEFAZOLIN SODIUM 2000 MG: 1 POWDER, FOR SOLUTION INTRAMUSCULAR; INTRAVENOUS at 03:15

## 2023-06-27 RX ADMIN — Medication 80 MCG: at 03:20

## 2023-06-27 RX ADMIN — TRANEXAMIC ACID 1000 MG: 100 INJECTION, SOLUTION INTRAVENOUS at 03:31

## 2023-06-27 RX ADMIN — Medication 80 MCG: at 03:36

## 2023-06-27 RX ADMIN — ONDANSETRON 4 MG: 2 INJECTION INTRAMUSCULAR; INTRAVENOUS at 13:42

## 2023-06-27 RX ADMIN — SODIUM CHLORIDE, POTASSIUM CHLORIDE, SODIUM LACTATE AND CALCIUM CHLORIDE: 600; 310; 30; 20 INJECTION, SOLUTION INTRAVENOUS at 03:31

## 2023-06-27 RX ADMIN — KETOROLAC TROMETHAMINE 30 MG: 15 INJECTION, SOLUTION INTRAMUSCULAR; INTRAVENOUS at 06:13

## 2023-06-27 RX ADMIN — Medication 80 MCG: at 03:11

## 2023-06-27 ASSESSMENT — PAIN SCALES - GENERAL: PAINLEVEL_OUTOF10: 0

## 2023-06-28 LAB
ERYTHROCYTE [DISTWIDTH] IN BLOOD BY AUTOMATED COUNT: 16.9 % (ref 11.5–14.5)
HCT VFR BLD AUTO: 28.5 % (ref 35–47)
HGB BLD-MCNC: 8.8 G/DL (ref 11.5–16)
MCH RBC QN AUTO: 24.6 PG (ref 26–34)
MCHC RBC AUTO-ENTMCNC: 30.9 G/DL (ref 30–36.5)
MCV RBC AUTO: 79.8 FL (ref 80–99)
NRBC # BLD: 0 K/UL (ref 0–0.01)
NRBC BLD-RTO: 0 PER 100 WBC
PLATELET # BLD AUTO: 251 K/UL (ref 150–400)
PMV BLD AUTO: 10.3 FL (ref 8.9–12.9)
RBC # BLD AUTO: 3.57 M/UL (ref 3.8–5.2)
WBC # BLD AUTO: 12.1 K/UL (ref 3.6–11)

## 2023-06-28 PROCEDURE — 85027 COMPLETE CBC AUTOMATED: CPT

## 2023-06-28 PROCEDURE — 36415 COLL VENOUS BLD VENIPUNCTURE: CPT

## 2023-06-28 PROCEDURE — 1100000000 HC RM PRIVATE

## 2023-06-28 PROCEDURE — 6370000000 HC RX 637 (ALT 250 FOR IP): Performed by: OBSTETRICS & GYNECOLOGY

## 2023-06-28 RX ADMIN — IBUPROFEN 600 MG: 600 TABLET, FILM COATED ORAL at 12:57

## 2023-06-28 RX ADMIN — DOCUSATE SODIUM 100 MG: 100 CAPSULE, LIQUID FILLED ORAL at 09:59

## 2023-06-28 RX ADMIN — IBUPROFEN 600 MG: 600 TABLET, FILM COATED ORAL at 04:54

## 2023-06-28 RX ADMIN — DOCUSATE SODIUM 100 MG: 100 CAPSULE, LIQUID FILLED ORAL at 20:55

## 2023-06-28 RX ADMIN — IBUPROFEN 600 MG: 600 TABLET, FILM COATED ORAL at 20:54

## 2023-06-28 RX ADMIN — ACETAMINOPHEN 1000 MG: 500 TABLET, FILM COATED ORAL at 19:40

## 2023-06-28 RX ADMIN — LEVOTHYROXINE SODIUM 88 MCG: 88 TABLET ORAL at 06:43

## 2023-06-28 ASSESSMENT — PAIN - FUNCTIONAL ASSESSMENT: PAIN_FUNCTIONAL_ASSESSMENT: ACTIVITIES ARE NOT PREVENTED

## 2023-06-28 ASSESSMENT — PAIN DESCRIPTION - ORIENTATION: ORIENTATION: ANTERIOR

## 2023-06-28 ASSESSMENT — PAIN DESCRIPTION - DESCRIPTORS: DESCRIPTORS: ACHING;BURNING

## 2023-06-28 ASSESSMENT — PAIN SCALES - GENERAL: PAINLEVEL_OUTOF10: 3

## 2023-06-28 ASSESSMENT — PAIN DESCRIPTION - LOCATION: LOCATION: ABDOMEN;INCISION

## 2023-06-29 PROCEDURE — 1100000000 HC RM PRIVATE

## 2023-06-29 PROCEDURE — 6370000000 HC RX 637 (ALT 250 FOR IP): Performed by: OBSTETRICS & GYNECOLOGY

## 2023-06-29 RX ADMIN — ACETAMINOPHEN 1000 MG: 500 TABLET, FILM COATED ORAL at 02:45

## 2023-06-29 RX ADMIN — LEVOTHYROXINE SODIUM 88 MCG: 88 TABLET ORAL at 07:32

## 2023-06-29 RX ADMIN — IBUPROFEN 600 MG: 600 TABLET, FILM COATED ORAL at 14:12

## 2023-06-29 RX ADMIN — IBUPROFEN 600 MG: 600 TABLET, FILM COATED ORAL at 21:43

## 2023-06-29 RX ADMIN — DOCUSATE SODIUM 100 MG: 100 CAPSULE, LIQUID FILLED ORAL at 09:44

## 2023-06-29 RX ADMIN — IBUPROFEN 600 MG: 600 TABLET, FILM COATED ORAL at 06:06

## 2023-06-29 RX ADMIN — ACETAMINOPHEN 1000 MG: 500 TABLET, FILM COATED ORAL at 14:12

## 2023-06-29 RX ADMIN — ACETAMINOPHEN 1000 MG: 500 TABLET, FILM COATED ORAL at 21:43

## 2023-06-29 ASSESSMENT — PAIN - FUNCTIONAL ASSESSMENT
PAIN_FUNCTIONAL_ASSESSMENT: ACTIVITIES ARE NOT PREVENTED

## 2023-06-29 ASSESSMENT — PAIN SCALES - GENERAL
PAINLEVEL_OUTOF10: 4
PAINLEVEL_OUTOF10: 3
PAINLEVEL_OUTOF10: 4

## 2023-06-29 ASSESSMENT — PAIN DESCRIPTION - LOCATION
LOCATION: ABDOMEN;INCISION
LOCATION: ABDOMEN;INCISION
LOCATION: INCISION

## 2023-06-29 ASSESSMENT — PAIN DESCRIPTION - DESCRIPTORS
DESCRIPTORS: BURNING;ACHING
DESCRIPTORS: SORE;BURNING
DESCRIPTORS: BURNING;ACHING

## 2023-06-29 ASSESSMENT — PAIN DESCRIPTION - ORIENTATION
ORIENTATION: ANTERIOR;LOWER
ORIENTATION: LOWER
ORIENTATION: ANTERIOR

## 2023-06-30 VITALS
DIASTOLIC BLOOD PRESSURE: 87 MMHG | SYSTOLIC BLOOD PRESSURE: 150 MMHG | BODY MASS INDEX: 28.47 KG/M2 | OXYGEN SATURATION: 100 % | WEIGHT: 145 LBS | HEIGHT: 60 IN | HEART RATE: 87 BPM | TEMPERATURE: 98.1 F | RESPIRATION RATE: 16 BRPM

## 2023-06-30 PROCEDURE — 6370000000 HC RX 637 (ALT 250 FOR IP): Performed by: OBSTETRICS & GYNECOLOGY

## 2023-06-30 RX ORDER — IBUPROFEN 800 MG/1
800 TABLET ORAL EVERY 6 HOURS PRN
Qty: 60 TABLET | Refills: 1 | Status: SHIPPED | OUTPATIENT
Start: 2023-06-30 | End: 2023-07-30

## 2023-06-30 RX ADMIN — ACETAMINOPHEN 1000 MG: 500 TABLET, FILM COATED ORAL at 06:07

## 2023-06-30 RX ADMIN — LEVOTHYROXINE SODIUM 88 MCG: 88 TABLET ORAL at 07:36

## 2023-06-30 RX ADMIN — IBUPROFEN 600 MG: 600 TABLET, FILM COATED ORAL at 06:08

## 2023-06-30 ASSESSMENT — PAIN DESCRIPTION - LOCATION: LOCATION: INCISION

## 2023-06-30 ASSESSMENT — PAIN DESCRIPTION - ORIENTATION: ORIENTATION: ANTERIOR

## 2023-06-30 ASSESSMENT — PAIN DESCRIPTION - DESCRIPTORS: DESCRIPTORS: BURNING

## 2023-06-30 ASSESSMENT — PAIN SCALES - GENERAL: PAINLEVEL_OUTOF10: 2

## 2023-07-11 ENCOUNTER — CLINICAL DOCUMENTATION (OUTPATIENT)
Age: 43
End: 2023-07-11

## 2023-08-08 ENCOUNTER — OFFICE VISIT (OUTPATIENT)
Age: 43
End: 2023-08-08

## 2023-08-08 VITALS
HEIGHT: 60 IN | DIASTOLIC BLOOD PRESSURE: 78 MMHG | BODY MASS INDEX: 25.29 KG/M2 | WEIGHT: 128.8 LBS | SYSTOLIC BLOOD PRESSURE: 143 MMHG

## 2023-08-08 PROCEDURE — 0503F POSTPARTUM CARE VISIT: CPT | Performed by: OBSTETRICS & GYNECOLOGY

## 2023-08-16 DIAGNOSIS — E04.9 NONTOXIC GOITER, UNSPECIFIED: ICD-10-CM

## 2023-08-16 DIAGNOSIS — E03.9 ACQUIRED HYPOTHYROIDISM: Primary | ICD-10-CM

## 2023-08-16 DIAGNOSIS — D35.1 BENIGN NEOPLASM OF PARATHYROID GLAND: ICD-10-CM

## 2023-08-21 ENCOUNTER — NURSE ONLY (OUTPATIENT)
Age: 43
End: 2023-08-21

## 2023-08-21 DIAGNOSIS — E04.9 NONTOXIC GOITER, UNSPECIFIED: ICD-10-CM

## 2023-08-21 DIAGNOSIS — E03.9 ACQUIRED HYPOTHYROIDISM: ICD-10-CM

## 2023-08-21 DIAGNOSIS — D35.1 BENIGN NEOPLASM OF PARATHYROID GLAND: ICD-10-CM

## 2023-08-23 LAB
T4 FREE SERPL-MCNC: 1.51 NG/DL (ref 0.82–1.77)
TSH SERPL DL<=0.005 MIU/L-ACNC: 0.05 UIU/ML (ref 0.45–4.5)

## 2023-08-28 ENCOUNTER — OFFICE VISIT (OUTPATIENT)
Age: 43
End: 2023-08-28
Payer: MEDICAID

## 2023-08-28 VITALS
HEIGHT: 60 IN | HEART RATE: 65 BPM | DIASTOLIC BLOOD PRESSURE: 65 MMHG | TEMPERATURE: 98.1 F | SYSTOLIC BLOOD PRESSURE: 119 MMHG | WEIGHT: 131.5 LBS | RESPIRATION RATE: 16 BRPM | OXYGEN SATURATION: 100 % | BODY MASS INDEX: 25.82 KG/M2

## 2023-08-28 DIAGNOSIS — E55.9 VITAMIN D DEFICIENCY: ICD-10-CM

## 2023-08-28 DIAGNOSIS — E53.8 VITAMIN B12 DEFICIENCY: ICD-10-CM

## 2023-08-28 DIAGNOSIS — E03.9 ACQUIRED HYPOTHYROIDISM: Primary | ICD-10-CM

## 2023-08-28 PROCEDURE — 99214 OFFICE O/P EST MOD 30 MIN: CPT | Performed by: INTERNAL MEDICINE

## 2023-08-28 RX ORDER — LEVOTHYROXINE SODIUM 88 UG/1
88 TABLET ORAL DAILY
Qty: 90 TABLET | Refills: 3 | Status: SHIPPED | OUTPATIENT
Start: 2023-08-28

## 2023-08-28 NOTE — PROGRESS NOTES
Shane Maldonado MD            Patient Information   Date:2023   Name : Sandra Aguilar 40 y.o.      YOB: 1980             Chief Complaint   Patient presents with    Thyroid Problem               History of present illness      Sandra Aguilar is a 40 y.o.  female        She has delivered the baby, had , not lactating  Craving more sweets, gained weight     Taking Unithroid consistently   No lump in throat/neck   No tachycardia      Prior history   She reestablished care in 2019   She was seen in  for primary hypothyroidism, she was pregnant then. She has long-standing history of hypothyroidism, was not compliant with the levothyroxine, levels have been fluctuating widely, highest TSH was 166,. Had ultrasound thyroid in May 2019 which showed diffuse goiter with possible right parathyroid adenoma. Calcium levels were normal. No hypercalcemia, kidney stones, family history of hypercalcemia or kidney stones      She delivered a healthy male baby in ,    Weight has been stable         Prior history   Recent TSH is 99, she has stopped taking levothyroxine due to side effects. Reports chest pain whenever she takes levothyroxine, not really tremors, nervousness, jitteriness. Feels better when she stops. Thinks that the dose is higher.   Last year when she took it consistently 100 mcg TSH was normal   Did not have menstrual cycles for 3 months, found to be pregnant, 12 weeks now                    Physical Examination:        General: pleasant, no distress, good eye contact    HEENT: no redness, no periorbital edema, EOMI    Neck: No thyromegaly    CVS: S1-S2 regular    RS: Normal respiratory effort    Musculoskeletal: no tremors    Neurological: alert and oriented    Psychiatric: normal mood and affect    Skin: color,  normal.      Data Reviewed:           Lab Results   Component Value Date    TSH 0.054 (L)

## 2023-08-28 NOTE — PROGRESS NOTES
Katheryn Simpson is a 40 y.o. female here for   Chief Complaint   Patient presents with    Thyroid Problem       1. Have you been to the ER, urgent care clinic since your last visit? Hospitalized since your last visit? -[facility]    2. Have you seen or consulted any other health care providers outside of the [address] [place] [address] Avenue since your last visit?   Include any pap smears or colon screening.-no

## 2023-11-28 DIAGNOSIS — E55.9 VITAMIN D DEFICIENCY: ICD-10-CM

## 2023-11-28 DIAGNOSIS — E53.8 VITAMIN B12 DEFICIENCY: ICD-10-CM

## 2023-11-28 DIAGNOSIS — E03.9 ACQUIRED HYPOTHYROIDISM: ICD-10-CM

## 2023-12-19 PROBLEM — O09.529 AMA (ADVANCED MATERNAL AGE) MULTIGRAVIDA 35+: Status: RESOLVED | Noted: 2023-01-08 | Resolved: 2023-12-19

## 2023-12-19 PROBLEM — O14.93 PRE-ECLAMPSIA IN THIRD TRIMESTER: Status: RESOLVED | Noted: 2023-06-26 | Resolved: 2023-12-19

## 2023-12-19 PROBLEM — O40.3XX0 POLYHYDRAMNIOS IN THIRD TRIMESTER: Status: RESOLVED | Noted: 2023-05-11 | Resolved: 2023-12-19

## 2023-12-19 PROBLEM — O99.019 ANEMIA DURING PREGNANCY: Status: RESOLVED | Noted: 2023-06-26 | Resolved: 2023-12-19

## 2024-02-21 ENCOUNTER — NURSE ONLY (OUTPATIENT)
Age: 44
End: 2024-02-21

## 2024-02-21 DIAGNOSIS — E53.8 VITAMIN B12 DEFICIENCY: ICD-10-CM

## 2024-02-21 DIAGNOSIS — E03.9 ACQUIRED HYPOTHYROIDISM: ICD-10-CM

## 2024-02-21 DIAGNOSIS — E55.9 VITAMIN D DEFICIENCY: ICD-10-CM

## 2024-02-22 LAB
25(OH)D3+25(OH)D2 SERPL-MCNC: 14.5 NG/ML (ref 30–100)
VIT B12 SERPL-MCNC: 296 PG/ML (ref 232–1245)

## 2024-02-23 LAB
T4 FREE SERPL-MCNC: 1.54 NG/DL (ref 0.82–1.77)
TSH SERPL DL<=0.005 MIU/L-ACNC: 0.14 UIU/ML (ref 0.45–4.5)

## 2024-02-28 ENCOUNTER — OFFICE VISIT (OUTPATIENT)
Age: 44
End: 2024-02-28
Payer: MEDICAID

## 2024-02-28 VITALS
RESPIRATION RATE: 16 BRPM | OXYGEN SATURATION: 98 % | DIASTOLIC BLOOD PRESSURE: 66 MMHG | SYSTOLIC BLOOD PRESSURE: 130 MMHG | HEIGHT: 60 IN | TEMPERATURE: 98 F | WEIGHT: 142.9 LBS | BODY MASS INDEX: 28.06 KG/M2 | HEART RATE: 74 BPM

## 2024-02-28 DIAGNOSIS — E03.9 ACQUIRED HYPOTHYROIDISM: Primary | ICD-10-CM

## 2024-02-28 DIAGNOSIS — E55.9 VITAMIN D DEFICIENCY: ICD-10-CM

## 2024-02-28 DIAGNOSIS — E53.8 VITAMIN B12 DEFICIENCY: ICD-10-CM

## 2024-02-28 PROCEDURE — 99214 OFFICE O/P EST MOD 30 MIN: CPT | Performed by: INTERNAL MEDICINE

## 2024-02-28 RX ORDER — LEVOTHYROXINE SODIUM 88 UG/1
88 TABLET ORAL DAILY
Qty: 90 TABLET | Refills: 3 | Status: SHIPPED | OUTPATIENT
Start: 2024-02-28

## 2024-02-28 NOTE — PROGRESS NOTES
[facility]                 Shira Baltazar MD            Patient Information   Date:2/27/2023   Name : Mellisa Conroy 42 y.o.      YOB: 1980             Chief Complaint   Patient presents with    Thyroid Problem               History of present illness      Mellisa Conroy is here for follow-up     She is 8 months postpartum  Craving more sweets, gained weight, cut down sweets recently  Vitamin D is low       Taking Unithroid consistently   No lump in throat/neck   No tachycardia      Prior history   She reestablished care in August 2019   She was seen in 2013 for primary hypothyroidism, she was pregnant then.   She has long-standing history of hypothyroidism, was not compliant with the levothyroxine, levels have been fluctuating widely, highest TSH was 166,.   Had ultrasound thyroid in May 2019 which showed diffuse goiter with possible right parathyroid adenoma. Calcium levels were normal. No hypercalcemia, kidney stones, family history of hypercalcemia or kidney stones      She delivered a healthy male baby in 2020,    Weight has been stable         Prior history   Recent TSH is 99, she has stopped taking levothyroxine due to side effects.  Reports chest pain whenever she takes levothyroxine, not really tremors, nervousness, jitteriness.   Feels better when she stops.  Thinks that the dose is higher.  Last year when she took it consistently 100 mcg TSH was normal   Did not have menstrual cycles for 3 months, found to be pregnant, 12 weeks now                    Physical Examination:        General: pleasant, no distress, good eye contact    HEENT: no redness, no periorbital edema, EOMI    Neck: No thyromegaly    CVS: S1-S2 regular    RS: Normal respiratory effort    Musculoskeletal: no tremors    Neurological: alert and oriented    Psychiatric: normal mood and affect    Skin: color,  normal.      Data Reviewed:           Lab Results   Component Value Date    TSH

## 2024-06-24 ENCOUNTER — NURSE ONLY (OUTPATIENT)
Age: 44
End: 2024-06-24

## 2024-06-24 ENCOUNTER — TELEPHONE (OUTPATIENT)
Age: 44
End: 2024-06-24

## 2024-06-24 DIAGNOSIS — N93.9 VAGINAL BLEEDING: Primary | ICD-10-CM

## 2024-06-24 DIAGNOSIS — N93.9 VAGINAL BLEEDING: ICD-10-CM

## 2024-06-24 NOTE — TELEPHONE ENCOUNTER
PT name and  verified    44 yo last ov/ae 23, next ae 24  , LMP 24(6w6d)?    PT calling stating she had a + hupt 6/10, retested ,,6/15 and was +.  PT started to have light spotting 24 and progressed throughout the week and got heavier and passed clot/tissue 24. PT last took a upt test 24 and was +.  PT has not had recent intercourse and wants to know if she needs to come in for an us, to see if she has \"passed everything\" or ov.  Consulted  and wants PT to have a beta hcg today and in 2 days, and based on levels will see if PT needs an us/ov.  PT scheduled for beta hcg today at 1120 and  at 1120.  Relayed to PT would be back in touch to see if she needs to come for lab visit Wed  or schedule for ov/us.  PT verbalizes understanding.    NILAM

## 2024-06-25 LAB — HCG INTACT+B SERPL-ACNC: 179 MIU/ML

## 2024-06-25 NOTE — TELEPHONE ENCOUNTER
MD sent message attached to beta hcg from 6/24, PT viewed:    This is a pretty low level but need to repeat in 2 days to make sure it is dropping appropriately.  No Need for ultrasound right now   Written by Yonatan Costa II, MD on 6/25/2024  8:59 AM EDT  Seen by patient Mellisa Conroy on 6/25/2024 12:05 PM

## 2024-06-26 ENCOUNTER — NURSE ONLY (OUTPATIENT)
Age: 44
End: 2024-06-26

## 2024-06-26 DIAGNOSIS — N93.9 VAGINAL BLEEDING: Primary | ICD-10-CM

## 2024-06-27 LAB — HCG INTACT+B SERPL-ACNC: 57 MIU/ML

## 2024-08-21 DIAGNOSIS — E55.9 VITAMIN D DEFICIENCY: ICD-10-CM

## 2024-08-21 DIAGNOSIS — E53.8 VITAMIN B12 DEFICIENCY: ICD-10-CM

## 2024-08-21 DIAGNOSIS — E03.9 ACQUIRED HYPOTHYROIDISM: ICD-10-CM

## 2024-08-22 ENCOUNTER — LAB (OUTPATIENT)
Age: 44
End: 2024-08-22

## 2024-08-22 DIAGNOSIS — E53.8 VITAMIN B12 DEFICIENCY: ICD-10-CM

## 2024-08-22 DIAGNOSIS — E03.9 ACQUIRED HYPOTHYROIDISM: ICD-10-CM

## 2024-08-22 DIAGNOSIS — E55.9 VITAMIN D DEFICIENCY: ICD-10-CM

## 2024-08-23 LAB
25(OH)D3+25(OH)D2 SERPL-MCNC: 36 NG/ML (ref 30–100)
T4 FREE SERPL-MCNC: 1.38 NG/DL (ref 0.82–1.77)
TSH SERPL DL<=0.005 MIU/L-ACNC: 0.45 UIU/ML (ref 0.45–4.5)

## 2024-08-24 LAB — VIT B12 SERPL-MCNC: 309 PG/ML (ref 232–1245)

## 2024-08-29 ENCOUNTER — OFFICE VISIT (OUTPATIENT)
Age: 44
End: 2024-08-29
Payer: MEDICAID

## 2024-08-29 VITALS
OXYGEN SATURATION: 100 % | HEART RATE: 77 BPM | SYSTOLIC BLOOD PRESSURE: 125 MMHG | HEIGHT: 60 IN | BODY MASS INDEX: 27.13 KG/M2 | TEMPERATURE: 98 F | DIASTOLIC BLOOD PRESSURE: 75 MMHG | WEIGHT: 138.2 LBS

## 2024-08-29 DIAGNOSIS — E55.9 VITAMIN D DEFICIENCY: ICD-10-CM

## 2024-08-29 DIAGNOSIS — E03.9 ACQUIRED HYPOTHYROIDISM: ICD-10-CM

## 2024-08-29 DIAGNOSIS — E53.8 VITAMIN B12 DEFICIENCY: ICD-10-CM

## 2024-08-29 DIAGNOSIS — E03.9 ACQUIRED HYPOTHYROIDISM: Primary | ICD-10-CM

## 2024-08-29 PROCEDURE — 99214 OFFICE O/P EST MOD 30 MIN: CPT | Performed by: INTERNAL MEDICINE

## 2024-08-29 RX ORDER — LEVOTHYROXINE SODIUM 88 UG/1
88 TABLET ORAL DAILY
Qty: 90 TABLET | Refills: 3 | Status: SHIPPED | OUTPATIENT
Start: 2024-08-29

## 2024-08-29 NOTE — PROGRESS NOTES
[facility] DIABETES AND ENDOCRINOLOGY                 Shira Baltazar MD               Chief Complaint   Patient presents with    Thyroid Problem               History of present illness      Mellisa Conroy is here for follow-up  Had a miscarriage, has 10 children   Taking Unithroid consistently, sometimes not getting on time from [employer]     No lump in throat/neck   No tachycardia      Prior history   She reestablished care in August 2019   She was seen in 2013 for primary hypothyroidism, she was pregnant then.   She has long-standing history of hypothyroidism, was not compliant with the levothyroxine, levels have been fluctuating widely, highest TSH was 166,.   Had ultrasound thyroid in May 2019 which showed diffuse goiter with possible right parathyroid adenoma. Calcium levels were normal. No hypercalcemia, kidney stones, family history of hypercalcemia or kidney stones      She delivered a healthy male baby in 2020,    Weight has been stable         Prior history   Recent TSH is 99, she has stopped taking levothyroxine due to side effects.  Reports chest pain whenever she takes levothyroxine, not really tremors, nervousness, jitteriness.   Feels better when she stops.  Thinks that the dose is higher.  Last year when she took it consistently 100 mcg TSH was normal   Did not have menstrual cycles for 3 months, found to be pregnant, 12 weeks now                    Physical Examination:        General: pleasant, no distress, good eye contact    HEENT: no redness, no periorbital edema, EOMI    Neck: No thyromegaly    CVS: S1-S2 regular    RS: Normal respiratory effort    Musculoskeletal: no tremors    Neurological: alert and oriented    Psychiatric: normal mood and affect    Skin: color,  normal.      Data Reviewed:           Lab Results   Component Value Date    TSH 0.447 (L) 08/22/2024    T4FREE 1.38 08/22/2024       No results found for: \"TRAB\", \"TSI\", \"TSILT\", \"TPO\"      [x] Reviewed labs

## 2024-11-21 DIAGNOSIS — E03.9 ACQUIRED HYPOTHYROIDISM: ICD-10-CM

## 2024-12-13 ENCOUNTER — LAB (OUTPATIENT)
Age: 44
End: 2024-12-13

## 2024-12-14 LAB
25(OH)D3+25(OH)D2 SERPL-MCNC: 27 NG/ML (ref 30–100)
T4 FREE SERPL-MCNC: 0.42 NG/DL (ref 0.82–1.77)
TSH SERPL DL<=0.005 MIU/L-ACNC: 33.3 UIU/ML (ref 0.45–4.5)
VIT B12 SERPL-MCNC: 282 PG/ML (ref 232–1245)

## 2024-12-19 ENCOUNTER — OFFICE VISIT (OUTPATIENT)
Age: 44
End: 2024-12-19
Payer: MEDICAID

## 2024-12-19 VITALS
BODY MASS INDEX: 27.92 KG/M2 | DIASTOLIC BLOOD PRESSURE: 78 MMHG | SYSTOLIC BLOOD PRESSURE: 130 MMHG | WEIGHT: 142.2 LBS | RESPIRATION RATE: 16 BRPM | HEIGHT: 60 IN | HEART RATE: 70 BPM

## 2024-12-19 DIAGNOSIS — E03.9 ACQUIRED HYPOTHYROIDISM: ICD-10-CM

## 2024-12-19 DIAGNOSIS — E53.8 VITAMIN B12 DEFICIENCY: ICD-10-CM

## 2024-12-19 DIAGNOSIS — Z01.419 WELL WOMAN EXAM WITH ROUTINE GYNECOLOGICAL EXAM: Primary | ICD-10-CM

## 2024-12-19 DIAGNOSIS — E55.9 VITAMIN D DEFICIENCY: ICD-10-CM

## 2024-12-19 PROCEDURE — 99396 PREV VISIT EST AGE 40-64: CPT | Performed by: OBSTETRICS & GYNECOLOGY

## 2024-12-19 NOTE — PROGRESS NOTES
Mellisa Conroy is a 44 y.o. female returns for an annual exam     Chief Complaint   Patient presents with    Annual Exam       Patient's last menstrual period was 12/13/2024.  Her periods are moderate in flow and usually regular with a 26-32 day interval with 3-7 day duration.  She does not have dysmenorrhea.  Problems: no problems  Birth Control: none.  Last Pap: normal obtained 1 year(s) ago.  She does not have a history of NAYA 2, 3 or cervical cancer.   Last Mammogram: had her mammogram today in our office.     Last Bone Density:  not obtained.  Last colonoscopy:  not obtained.      1. Have you been to the ER, urgent care clinic, or hospitalized since your last visit? No    2. Have you seen or consulted any other health care providers outside of the [facility] since your last visit? No    Examination chaperoned by RAYMOND BATLAZAR CMA.  
bowel sounds, no masses present  Liver and spleen: no hepatomegaly present, spleen not palpable  Hernias: no hernias identified    Genitourinary  External Genitalia: normal appearance for age, no discharge present, no tenderness present, no inflammatory lesions present, no masses present, no atrophy present  Vagina: normal vaginal vault without central or paravaginal defects, no discharge present, no inflammatory lesions present, no masses present  Bladder: non-tender to palpation  Urethra: appears normal  Cervix: normal   Uterus: normal size, shape and consistency, retroverted.  Adnexa: no adnexal tenderness present, no adnexal masses present  Perineum: perineum within normal limits, no evidence of trauma, no rashes or skin lesions present  Anus: anus within normal limits, no hemorrhoids present  Inguinal Lymph Nodes: no lymphadenopathy present    Skin  General Inspection: no rash, no lesions identified    Neurologic/Psychiatric  Mental Status:  Orientation: grossly oriented to person, place and time  Mood and Affect: mood normal, affect appropriate    Labs:  No results found for this or any previous visit (from the past 12 hour(s)).    Assessment:  Problem List Items Addressed This Visit    None  Visit Diagnoses       Well woman exam with routine gynecological exam    -  Primary    Relevant Orders    PAP IG, Aptima HPV and rfx 16/18,45 ([id number])          Plan:  Counseled re: diet, exercise, healthy lifestyle  Rec annual mammogram    Return in about 1 year (around 12/19/2025) for Annual.    Data Unavailable

## 2024-12-20 ENCOUNTER — OFFICE VISIT (OUTPATIENT)
Age: 44
End: 2024-12-20
Payer: MEDICAID

## 2024-12-20 VITALS
HEIGHT: 60 IN | TEMPERATURE: 97.8 F | OXYGEN SATURATION: 100 % | DIASTOLIC BLOOD PRESSURE: 73 MMHG | BODY MASS INDEX: 27.68 KG/M2 | SYSTOLIC BLOOD PRESSURE: 115 MMHG | HEART RATE: 74 BPM | WEIGHT: 141 LBS

## 2024-12-20 DIAGNOSIS — E53.8 VITAMIN B12 DEFICIENCY: ICD-10-CM

## 2024-12-20 DIAGNOSIS — E03.9 ACQUIRED HYPOTHYROIDISM: Primary | ICD-10-CM

## 2024-12-20 DIAGNOSIS — E55.9 VITAMIN D DEFICIENCY: ICD-10-CM

## 2024-12-20 PROCEDURE — 99214 OFFICE O/P EST MOD 30 MIN: CPT | Performed by: INTERNAL MEDICINE

## 2024-12-20 ASSESSMENT — PATIENT HEALTH QUESTIONNAIRE - PHQ9
SUM OF ALL RESPONSES TO PHQ QUESTIONS 1-9: 0
2. FEELING DOWN, DEPRESSED OR HOPELESS: NOT AT ALL
1. LITTLE INTEREST OR PLEASURE IN DOING THINGS: NOT AT ALL
SUM OF ALL RESPONSES TO PHQ9 QUESTIONS 1 & 2: 0

## 2024-12-20 NOTE — PROGRESS NOTES
[facility] DIABETES AND ENDOCRINOLOGY                 Shira Baltazar MD               Chief Complaint   Patient presents with    Hypothyroidism    Thyroid Problem       The patient (or guardian, if applicable) and other individuals in attendance with the patient were advised that Artificial Intelligence will be utilized during this visit to record, process the conversation to generate a clinical note, and support improvement of the AI technology. The patient (or guardian, if applicable) and other individuals in attendance at the appointment consented to the use of AI, including the recording.               Mellisa Conroy is here for follow-up  History of Present Illness  The patient presents for evaluation of hypothyroidism.  She has discontinued Unithroid  She attributes persistent knee pain, weight gain to Unithroid. She has lost 5 pounds since discontinuing her thyroid medication 20 days ago. She reports decreased appetite, frequent headaches, lack of energy, muscle weakness,weight gain and fluid retention and thinks it is due to Unithroid   . She has not made any changes to her diet or other medications . She weighed 145 pounds before discontinuing the medication,  and currently weighs 141 pounds.  Reports that she has been unable to conceive while on the thyroid medication and had a miscarriage this year.           Prior history   She reestablished care in August 2019   She was seen in 2013 for primary hypothyroidism, she was pregnant then.   She has long-standing history of hypothyroidism, was not compliant with the levothyroxine, levels have been fluctuating widely, highest TSH was 166,.   Had ultrasound thyroid in May 2019 which showed diffuse goiter with possible right parathyroid adenoma. Calcium levels were normal. No hypercalcemia, kidney stones, family history of hypercalcemia or kidney stones      She delivered a healthy male baby in 2020,    Weight has been stable         Prior

## 2024-12-20 NOTE — PROGRESS NOTES
Identified pt with two pt identifiers(name and ). Reviewed record in preparation for visit and have obtained necessary documentation. All patient medications has been reviewed.  Chief Complaint   Patient presents with    Hypothyroidism    Thyroid Problem       There were no vitals filed for this visit.                Coordination of Care Questionnaire:   1) Have you been to an emergency room, urgent care, or hospitalized since your last visit?   No       2. Have seen or consulted any other health care provider since your last visit? No        Patient is accompanied by self I have received verbal consent from Mellisa Conroy to discuss any/all medical information while they are present in the room.

## 2024-12-30 LAB
CYTOLOGIST CVX/VAG CYTO: ABNORMAL
CYTOLOGY CVX/VAG DOC CYTO: ABNORMAL
CYTOLOGY CVX/VAG DOC THIN PREP: ABNORMAL
DX ICD CODE: ABNORMAL
HPV GENOTYPE REFLEX: ABNORMAL
HPV I/H RISK 4 DNA CVX QL PROBE+SIG AMP: POSITIVE
HPV16 DNA CVX QL PROBE+SIG AMP: NEGATIVE
HPV18+45 E6+E7 MRNA CVX QL NAA+PROBE: NEGATIVE
Lab: ABNORMAL
OTHER STN SPEC: ABNORMAL
STAT OF ADQ CVX/VAG CYTO-IMP: ABNORMAL

## 2024-12-31 PROBLEM — R87.810 CERVICAL HIGH RISK HPV (HUMAN PAPILLOMAVIRUS) TEST POSITIVE: Status: ACTIVE | Noted: 2024-12-31

## 2025-01-06 ENCOUNTER — LAB (OUTPATIENT)
Age: 45
End: 2025-01-06

## 2025-01-06 DIAGNOSIS — E03.9 ACQUIRED HYPOTHYROIDISM: ICD-10-CM

## 2025-01-08 LAB
BUN SERPL-MCNC: 12 MG/DL (ref 6–24)
BUN/CREAT SERPL: 13 (ref 9–23)
CALCIUM SERPL-MCNC: 9.3 MG/DL (ref 8.7–10.2)
CHLORIDE SERPL-SCNC: 102 MMOL/L (ref 96–106)
CO2 SERPL-SCNC: 23 MMOL/L (ref 20–29)
CREAT SERPL-MCNC: 0.95 MG/DL (ref 0.57–1)
EGFRCR SERPLBLD CKD-EPI 2021: 76 ML/MIN/1.73
GLUCOSE SERPL-MCNC: 97 MG/DL (ref 70–99)
POTASSIUM SERPL-SCNC: 5.1 MMOL/L (ref 3.5–5.2)
SODIUM SERPL-SCNC: 138 MMOL/L (ref 134–144)
TSH SERPL DL<=0.005 MIU/L-ACNC: 161 UIU/ML (ref 0.45–4.5)

## 2025-01-15 LAB — T4 FREE SERPL-MCNC: <0.1 NG/DL (ref 0.82–1.77)

## 2025-02-06 NOTE — PROGRESS NOTES
SBAR IN Report: Mother    Verbal report received from MARI Rodgers RN (full name & credentials) on this patient, who is now being transferred from L&D (unit) for routine progression of care. Report consisted of patient's Situation, Background, Assessment and Recommendations (SBAR).  ID bands were compared with the identification form, and verified with the patient and transferring nurse. Information from the SBAR, Kardex, Intake/Output and MAR and the Wang Report was reviewed with the transferring nurse; opportunity for questions and clarification provided. What Type Of Note Output Would You Prefer (Optional)?: Bullet Format How Severe Are Your Spot(S)?: moderate Have Your Spot(S) Been Treated In The Past?: has not been treated Hpi Title: Evaluation of a Skin Lesion Additional History: FBSE

## 2025-03-10 ENCOUNTER — ANCILLARY PROCEDURE (OUTPATIENT)
Age: 45
End: 2025-03-10
Payer: MEDICAID

## 2025-03-10 ENCOUNTER — OFFICE VISIT (OUTPATIENT)
Age: 45
End: 2025-03-10
Payer: MEDICAID

## 2025-03-10 VITALS
HEART RATE: 69 BPM | RESPIRATION RATE: 14 BRPM | SYSTOLIC BLOOD PRESSURE: 127 MMHG | DIASTOLIC BLOOD PRESSURE: 77 MMHG | TEMPERATURE: 98 F | BODY MASS INDEX: 27.85 KG/M2 | OXYGEN SATURATION: 96 % | WEIGHT: 142.6 LBS

## 2025-03-10 DIAGNOSIS — M79.671 RIGHT FOOT PAIN: Primary | ICD-10-CM

## 2025-03-10 DIAGNOSIS — M21.611 BUNION OF GREAT TOE OF RIGHT FOOT: ICD-10-CM

## 2025-03-10 DIAGNOSIS — M79.671 RIGHT FOOT PAIN: ICD-10-CM

## 2025-03-10 PROCEDURE — 99203 OFFICE O/P NEW LOW 30 MIN: CPT | Performed by: FAMILY MEDICINE

## 2025-03-10 PROCEDURE — 73630 X-RAY EXAM OF FOOT: CPT

## 2025-03-10 RX ORDER — LEVOTHYROXINE, LIOTHYRONINE 9.5; 2.25 UG/1; UG/1
15 TABLET ORAL DAILY
COMMUNITY
Start: 2025-02-18

## 2025-03-10 RX ORDER — LEVOTHYROXINE SODIUM 50 UG/1
50 TABLET ORAL DAILY
COMMUNITY
Start: 2025-02-10

## 2025-03-10 NOTE — PROGRESS NOTES
Room 19    Patient is accompanied by self.     I have received verbal consent from Mellisa Conroy to discuss any/all medical information while they are present in the room.    Identified pt with two pt identifiers(name and ). Reviewed record in preparation for visit and have obtained necessary documentation.  Chief Complaint   Patient presents with    Foot Pain        Health Maintenance Due   Topic    Varicella vaccine (1 of 2 - 13+ 2-dose series)    Hepatitis B vaccine (1 of 3 - 19+ 3-dose series)    Flu vaccine (1)    COVID-19 Vaccine ( season)       Vitals:    03/10/25 1140   BP: 127/77   BP Site: Left Upper Arm   Patient Position: Sitting   BP Cuff Size: Medium Adult   Pulse: 69   Resp: 14   Temp: 98 °F (36.7 °C)   TempSrc: Oral   SpO2: 96%   Weight: 64.7 kg (142 lb 9.6 oz)         Coordination of Care Questionnaire:       \"Have you been to the ER, urgent care clinic since your last visit?  Hospitalized since your last visit?\"    NO    “Have you seen or consulted any other health care providers outside of [facility] since your last visit?”    NO            Click Here for Release of Records Request   
symmetric air entry  Heart - normal rate, regular rhythm, normal S1, S2, no murmurs, rubs, clicks or gallops  Extremities - peripheral pulses normal, no pedal edema, no clubbing or cyanosis, +Bunions on both feet R>L, there is mild tenderness over the proxymal part of the foot.   Skin - normal coloration and turgor, no rashes, no suspicious skin lesions noted      Assessment:   Mellisa Conryo is a 44 y.o. female who was seen today for:   Diagnosis Orders   1. Right foot pain  XR FOOT RIGHT (MIN 3 VIEWS)    diclofenac sodium (VOLTAREN) 1 % GEL      2. Bunion of great toe of right foot  [facility] - Rogelio Ibarra, MANINDER, Podiatry, [place]              Plan:   The patient with chronic foot pain in the setting of signifincat bunion and mid arthritis. No hx of trauma. XR w/o fracture or dislocation.  Supportive care with comfortable shoes with insults.   Will refer to podiatrist for evaluation for bunion treatment options.   Topical Diclofenac cream foe pain     Return in about 4 weeks (around 4/7/2025) for Annual Physical exam .        I have discussed the diagnosis with the patient and the intended plan as seen in the above orders.  The patient has received an after-visit summary and questions were answered concerning future plans.  I have discussed medication side effects and warnings with the patient as well. Informed pt to return to the office if new symptoms arise.      Alejandra Lux MD  Family Medicine Attending Physician

## 2025-03-14 DIAGNOSIS — E03.9 ACQUIRED HYPOTHYROIDISM: ICD-10-CM

## 2025-03-14 DIAGNOSIS — E53.8 VITAMIN B12 DEFICIENCY: ICD-10-CM

## 2025-03-14 DIAGNOSIS — E55.9 VITAMIN D DEFICIENCY: ICD-10-CM

## 2025-03-18 LAB — 25(OH)D3+25(OH)D2 SERPL-MCNC: 29 NG/ML (ref 30–100)

## 2025-03-19 LAB
T3 SERPL-MCNC: 75 NG/DL (ref 71–180)
T4 FREE SERPL-MCNC: 0.87 NG/DL (ref 0.82–1.77)
TSH SERPL DL<=0.005 MIU/L-ACNC: 27.5 UIU/ML (ref 0.45–4.5)
VIT B12 SERPL-MCNC: 241 PG/ML (ref 232–1245)

## 2025-03-24 ENCOUNTER — OFFICE VISIT (OUTPATIENT)
Age: 45
End: 2025-03-24
Payer: MEDICAID

## 2025-03-24 VITALS
OXYGEN SATURATION: 99 % | DIASTOLIC BLOOD PRESSURE: 81 MMHG | SYSTOLIC BLOOD PRESSURE: 128 MMHG | BODY MASS INDEX: 28.66 KG/M2 | HEIGHT: 60 IN | WEIGHT: 146 LBS | HEART RATE: 64 BPM | TEMPERATURE: 98.7 F

## 2025-03-24 DIAGNOSIS — E53.8 VITAMIN B12 DEFICIENCY: ICD-10-CM

## 2025-03-24 DIAGNOSIS — E55.9 VITAMIN D DEFICIENCY: ICD-10-CM

## 2025-03-24 DIAGNOSIS — E03.9 ACQUIRED HYPOTHYROIDISM: Primary | ICD-10-CM

## 2025-03-24 PROCEDURE — 99214 OFFICE O/P EST MOD 30 MIN: CPT | Performed by: INTERNAL MEDICINE

## 2025-03-24 PROCEDURE — G2211 COMPLEX E/M VISIT ADD ON: HCPCS | Performed by: INTERNAL MEDICINE

## 2025-03-24 RX ORDER — LIOTHYRONINE SODIUM 5 UG/1
5 TABLET ORAL DAILY
Qty: 30 TABLET | Refills: 3 | Status: SHIPPED | OUTPATIENT
Start: 2025-03-24

## 2025-03-24 NOTE — PROGRESS NOTES
Identified pt with two pt identifiers(name and ). Reviewed record in preparation for visit and have obtained necessary documentation. All patient medications has been reviewed.  Chief Complaint   Patient presents with    Thyroid Problem       Vitals:    25 1132   BP: 128/81   Pulse: 64   Temp: 98.7 °F (37.1 °C)   SpO2: 99%                   Coordination of Care Questionnaire:   1) Have you been to an emergency room, urgent care, or hospitalized since your last visit?   No       2. Have seen or consulted any other health care provider since your last visit? No        Patient is accompanied by self I have received verbal consent from Mellisa Conroy to discuss any/all medical information while they are present in the room.  
Prior history   Recent TSH is 99, she has stopped taking levothyroxine due to side effects.  Reports chest pain whenever she takes levothyroxine, not really tremors, nervousness, jitteriness.   Feels better when she stops.  Thinks that the dose is higher.  Last year when she took it consistently 100 mcg TSH was normal   Did not have menstrual cycles for 3 months, found to be pregnant, 12 weeks now                    Physical Examination:        General: pleasant, no distress, good eye contact    HEENT: no redness, no periorbital edema, EOMI    Neck: No thyromegaly    CVS: S1-S2 regular    RS: Normal respiratory effort    Musculoskeletal: no tremors    Neurological: alert and oriented    Psychiatric: normal mood and affect    Skin: color,  normal.      Data Reviewed:           Lab Results   Component Value Date    TSH 27.500 (H) 03/17/2025    E1JFSZV 75 03/17/2025    T4FREE 0.87 03/17/2025       No results found for: \"TRAB\", \"TSI\", \"TSILT\", \"TPO\"      [x] Reviewed labs      Assessment/Plan:       Primary hypothyroidism:   She was on Unithroid 88 mcg , with good levels, has tried levothyroxine in the past, did not tolerate well  She is getting the brand Unithroid from [employer], confirmed with the tablets    She self discontinued Unithroid attributing weight gain, muscle weakness, fatigue, constipation, infertility to Unithroid,  TSH  160 , free T4 low  after discontinuing Unithroid, indicating severe hypothyroidism.  Cannot explain her symptoms, reports she feels better without meds  Risk of uncontrolled hypothyroidism, CHF, coma, death discussed  Can try half a tablet , declined  Recommended see another endocrinologist information given - [facility] ,Dr Israel, or any endocrinology practices covered by her insurance    Saw Dr Israel - she was recommended Unithroid 50 mcg ( colorless) NP thyroid 15 mg 1/2 tablet - taking but she feels unwell , returned back to the practice, records requested , TSH 25 FT4 0.87 T3 low , B12 low

## 2025-04-22 ENCOUNTER — LAB (OUTPATIENT)
Age: 45
End: 2025-04-22

## 2025-04-22 DIAGNOSIS — E55.9 VITAMIN D DEFICIENCY: ICD-10-CM

## 2025-04-22 DIAGNOSIS — E53.8 VITAMIN B12 DEFICIENCY: ICD-10-CM

## 2025-04-22 DIAGNOSIS — E03.9 ACQUIRED HYPOTHYROIDISM: ICD-10-CM

## 2025-04-23 LAB
T3 SERPL-MCNC: 138 NG/DL (ref 71–180)
T4 FREE SERPL-MCNC: 0.86 NG/DL (ref 0.82–1.77)
TSH SERPL DL<=0.005 MIU/L-ACNC: 20 UIU/ML (ref 0.45–4.5)

## 2025-04-28 ENCOUNTER — OFFICE VISIT (OUTPATIENT)
Age: 45
End: 2025-04-28
Payer: MEDICAID

## 2025-04-28 VITALS
HEART RATE: 70 BPM | BODY MASS INDEX: 27.48 KG/M2 | WEIGHT: 140 LBS | RESPIRATION RATE: 16 BRPM | TEMPERATURE: 98.2 F | HEIGHT: 60 IN | DIASTOLIC BLOOD PRESSURE: 70 MMHG | SYSTOLIC BLOOD PRESSURE: 132 MMHG | OXYGEN SATURATION: 100 %

## 2025-04-28 DIAGNOSIS — E03.9 ACQUIRED HYPOTHYROIDISM: Primary | ICD-10-CM

## 2025-04-28 DIAGNOSIS — E55.9 VITAMIN D DEFICIENCY: ICD-10-CM

## 2025-04-28 DIAGNOSIS — E53.8 VITAMIN B12 DEFICIENCY: ICD-10-CM

## 2025-04-28 PROCEDURE — 99214 OFFICE O/P EST MOD 30 MIN: CPT | Performed by: INTERNAL MEDICINE

## 2025-04-28 RX ORDER — LIOTHYRONINE SODIUM 5 UG/1
10 TABLET ORAL
Qty: 60 TABLET | Refills: 3 | Status: SHIPPED | OUTPATIENT
Start: 2025-04-28

## 2025-04-28 NOTE — PROGRESS NOTES
Mellisa Conroy is a 44 y.o. female here for   Chief Complaint   Patient presents with    Thyroid Problem       1. Have you been to the ER, urgent care clinic since your last visit?  Hospitalized since your last visit? -no    2. Have you seen or consulted any other health care providers outside of the [facility] since your last visit?  Include any pap smears or colon screening.-no    
heart disease.        FAMILY HISTORY  - Mother  from heart disease      T        Prior history   She reestablished care in 2019   She was seen in  for primary hypothyroidism, she was pregnant then.   She has long-standing history of hypothyroidism, was not compliant with the levothyroxine, levels have been fluctuating widely, highest TSH was 166,.   Had ultrasound thyroid in May 2019 which showed diffuse goiter with possible right parathyroid adenoma. Calcium levels were normal. No hypercalcemia, kidney stones, family history of hypercalcemia or kidney stones      She delivered a healthy male baby in ,    Weight has been stable         Prior history   Recent TSH is 99, she has stopped taking levothyroxine due to side effects.  Reports chest pain whenever she takes levothyroxine, not really tremors, nervousness, jitteriness.   Feels better when she stops.  Thinks that the dose is higher.  Last year when she took it consistently 100 mcg TSH was normal   Did not have menstrual cycles for 3 months, found to be pregnant, 12 weeks now                    Physical Examination:        General: pleasant, no distress, good eye contact    HEENT: no redness, no periorbital edema, EOMI    Neck: No thyromegaly    CVS: S1-S2 regular    RS: Normal respiratory effort    Musculoskeletal: no tremors    Neurological: alert and oriented    Psychiatric: normal mood and affect    Skin: color,  normal.      Data Reviewed:           Lab Results   Component Value Date    TSH 20.000 (H) 2025    H0DIMOC 138 2025    T4FREE 0.86 2025       No results found for: \"TRAB\", \"TSI\", \"TSILT\", \"TPO\"      [x] Reviewed labs      Assessment/Plan:       Primary hypothyroidism:   She was on Unithroid 88 mcg , with good levels, has tried levothyroxine in the past, did not tolerate well  She is getting the brand Unithroid from [employer], confirmed with the tablets    She self discontinued Unithroid attributing weight gain,

## 2025-05-18 ENCOUNTER — PATIENT MESSAGE (OUTPATIENT)
Age: 45
End: 2025-05-18

## 2025-05-18 DIAGNOSIS — E03.9 ACQUIRED HYPOTHYROIDISM: Primary | ICD-10-CM

## 2025-05-19 RX ORDER — LEVOTHYROXINE SODIUM 50 UG/1
50 TABLET ORAL DAILY
Qty: 30 TABLET | Refills: 11 | Status: SHIPPED | OUTPATIENT
Start: 2025-05-19

## 2025-06-16 DIAGNOSIS — E03.9 ACQUIRED HYPOTHYROIDISM: ICD-10-CM

## 2025-06-16 DIAGNOSIS — E53.8 VITAMIN B12 DEFICIENCY: ICD-10-CM

## 2025-06-16 DIAGNOSIS — E55.9 VITAMIN D DEFICIENCY: ICD-10-CM

## 2025-07-21 DIAGNOSIS — E03.9 ACQUIRED HYPOTHYROIDISM: ICD-10-CM

## 2025-07-29 ENCOUNTER — LAB (OUTPATIENT)
Age: 45
End: 2025-07-29

## 2025-07-30 LAB
25(OH)D3+25(OH)D2 SERPL-MCNC: 33.5 NG/ML (ref 30–100)
VIT B12 SERPL-MCNC: 231 PG/ML (ref 232–1245)

## 2025-07-31 LAB
T3 SERPL-MCNC: 216 NG/DL (ref 71–180)
T4 FREE SERPL-MCNC: 0.82 NG/DL (ref 0.82–1.77)
TSH SERPL DL<=0.005 MIU/L-ACNC: 3.41 UIU/ML (ref 0.45–4.5)

## (undated) DEVICE — SUTURE VCRL + 3-0 VLT BRN SH NDL VC316H

## (undated) DEVICE — CLEANER ES TIP W2XL2IN ADH BK RADPQ FOR S STL ELECTRD

## (undated) DEVICE — SUTURE VCRL + SZ 2-0 L36IN ABSRB UD L36MM CT-1 1/2 CIR VCP945H

## (undated) DEVICE — C-SECTION-SFMC: Brand: MEDLINE INDUSTRIES, INC.

## (undated) DEVICE — CANISTER, RIGID, 3000CC: Brand: MEDLINE INDUSTRIES, INC.

## (undated) DEVICE — STRAP,POSITIONING,KNEE/BODY,FOAM,4X60": Brand: MEDLINE

## (undated) DEVICE — STAPLER SKIN SQ 30 ABSRB STPL DISP INSORB ORDER VIA PHONE OR EMAIL

## (undated) DEVICE — ELECTRODE PT RET AD L9FT HI MOIST COND ADH HYDRGEL CORDED

## (undated) DEVICE — APPLICATOR MEDICATED 26 CC SOLUTION HI LT ORNG CHLORAPREP

## (undated) DEVICE — TOWEL,OR,DSP,ST,BLUE,STD,2/PK,40PK/CS: Brand: MEDLINE

## (undated) DEVICE — SUTURE VCRL + SZ 0 L36IN ABSRB VLT L40MM CT 1/2 CIR TAPR VCP358H

## (undated) DEVICE — INTENT OT USE PROVIDES A STERILE INTERFACE BETWEEN THE OPERATING ROOM SURGICAL LAMPS (NON-STERILE) AND THE SURGEON OR STAFF WORKING IN THE STERILE FIELD.: Brand: ASPEN® ALC PLUS LIGHT HANDLE COVER

## (undated) DEVICE — TOTAL TRAY, 16FR 10ML SIL FOLEY, URN: Brand: MEDLINE

## (undated) DEVICE — SYRINGE IRRIG 60ML SFT PLIABLE BLB EZ TO GRP 1 HND USE W/

## (undated) DEVICE — PENCIL ES L3M ROCK SWCH S STL HEX LOK BLDE ELECTRD HOLSTER

## (undated) DEVICE — SOLUTION IRRIG 1000ML STRL H2O USP PLAS POUR BTL

## (undated) DEVICE — SOLIDIFIER FLD 3.2OZ 3000CC TRAD IN BTL LIQUI-LOC

## (undated) DEVICE — GOWN,SIRUS,FABRNF,XL,20/CS: Brand: MEDLINE

## (undated) DEVICE — SUTURE VCRL + SZ 0 L36IN ABSRB VLT CTX 1/2 CIR TAPR PNT NDL VCP370H